# Patient Record
Sex: FEMALE | Race: WHITE | NOT HISPANIC OR LATINO | Employment: OTHER | URBAN - METROPOLITAN AREA
[De-identification: names, ages, dates, MRNs, and addresses within clinical notes are randomized per-mention and may not be internally consistent; named-entity substitution may affect disease eponyms.]

---

## 2017-01-12 ENCOUNTER — APPOINTMENT (INPATIENT)
Dept: CT IMAGING | Facility: HOSPITAL | Age: 72
DRG: 280 | End: 2017-01-12
Payer: COMMERCIAL

## 2017-01-12 ENCOUNTER — APPOINTMENT (INPATIENT)
Dept: NON INVASIVE DIAGNOSTICS | Facility: HOSPITAL | Age: 72
DRG: 280 | End: 2017-01-12
Payer: COMMERCIAL

## 2017-01-12 ENCOUNTER — APPOINTMENT (EMERGENCY)
Dept: CT IMAGING | Facility: HOSPITAL | Age: 72
DRG: 280 | End: 2017-01-12
Payer: COMMERCIAL

## 2017-01-12 ENCOUNTER — APPOINTMENT (INPATIENT)
Dept: NON INVASIVE DIAGNOSTICS | Facility: HOSPITAL | Age: 72
DRG: 280 | End: 2017-01-12
Attending: HOSPITALIST
Payer: COMMERCIAL

## 2017-01-12 ENCOUNTER — HOSPITAL ENCOUNTER (INPATIENT)
Facility: HOSPITAL | Age: 72
LOS: 3 days | DRG: 280 | End: 2017-01-15
Attending: EMERGENCY MEDICINE | Admitting: HOSPITALIST
Payer: COMMERCIAL

## 2017-01-12 ENCOUNTER — GENERIC CONVERSION - ENCOUNTER (OUTPATIENT)
Dept: OTHER | Facility: OTHER | Age: 72
End: 2017-01-12

## 2017-01-12 ENCOUNTER — APPOINTMENT (EMERGENCY)
Dept: RADIOLOGY | Facility: HOSPITAL | Age: 72
DRG: 280 | End: 2017-01-12
Payer: COMMERCIAL

## 2017-01-12 DIAGNOSIS — R77.8 ELEVATED TROPONIN: ICD-10-CM

## 2017-01-12 DIAGNOSIS — I21.4 NSTEMI (NON-ST ELEVATED MYOCARDIAL INFARCTION) (HCC): ICD-10-CM

## 2017-01-12 DIAGNOSIS — J18.9 COMMUNITY ACQUIRED PNEUMONIA: Primary | ICD-10-CM

## 2017-01-12 DIAGNOSIS — R07.9 CHEST PAIN: ICD-10-CM

## 2017-01-12 DIAGNOSIS — R09.02 HYPOXIA: ICD-10-CM

## 2017-01-12 PROBLEM — Z85.42 HISTORY OF ENDOMETRIAL CANCER: Status: ACTIVE | Noted: 2017-01-12

## 2017-01-12 PROBLEM — E11.9 TYPE 2 DIABETES MELLITUS WITHOUT COMPLICATION (HCC): Status: ACTIVE | Noted: 2017-01-12

## 2017-01-12 PROBLEM — M10.9 GOUT: Status: ACTIVE | Noted: 2017-01-12

## 2017-01-12 LAB
ALBUMIN SERPL BCP-MCNC: 3 G/DL (ref 3.5–5)
ALP SERPL-CCNC: 134 U/L (ref 46–116)
ALT SERPL W P-5'-P-CCNC: 41 U/L (ref 12–78)
ANION GAP BLD CALC-SCNC: 19 MMOL/L (ref 4–13)
APTT PPP: 26 SECONDS (ref 24–36)
APTT PPP: 33 SECONDS (ref 24–36)
AST SERPL W P-5'-P-CCNC: 26 U/L (ref 5–45)
ATRIAL RATE: 111 BPM
BASOPHILS # BLD AUTO: 0.05 THOUSANDS/ΜL (ref 0–0.1)
BASOPHILS NFR BLD AUTO: 1 % (ref 0–1)
BILIRUB DIRECT SERPL-MCNC: 0.11 MG/DL (ref 0–0.2)
BILIRUB SERPL-MCNC: 0.4 MG/DL (ref 0.2–1)
BUN BLD-MCNC: 17 MG/DL (ref 5–25)
CA-I BLD-SCNC: 1.15 MMOL/L (ref 1.12–1.32)
CHLORIDE BLD-SCNC: 100 MMOL/L (ref 100–108)
CLARITY, POC: CLEAR
COLOR, POC: YELLOW
CREAT BLD-MCNC: 0.5 MG/DL (ref 0.6–1.3)
EOSINOPHIL # BLD AUTO: 0.15 THOUSAND/ΜL (ref 0–0.61)
EOSINOPHIL NFR BLD AUTO: 2 % (ref 0–6)
ERYTHROCYTE [DISTWIDTH] IN BLOOD BY AUTOMATED COUNT: 14.4 % (ref 11.6–15.1)
ERYTHROCYTE [DISTWIDTH] IN BLOOD BY AUTOMATED COUNT: 14.5 % (ref 11.6–15.1)
EXT BILIRUBIN, UA: NORMAL
EXT BLOOD URINE: NORMAL
EXT GLUCOSE, UA: 500
EXT KETONES: NORMAL
EXT NITRITE, UA: NORMAL
EXT PH, UA: 6.5
EXT PROTEIN, UA: NORMAL
EXT SPECIFIC GRAVITY, UA: 1.01
EXT UROBILINOGEN: NORMAL
GFR SERPL CREATININE-BSD FRML MDRD: >60 ML/MIN/1.73SQ M
GLUCOSE SERPL-MCNC: 281 MG/DL (ref 65–140)
GLUCOSE SERPL-MCNC: 297 MG/DL (ref 65–140)
GLUCOSE SERPL-MCNC: 301 MG/DL (ref 65–140)
GLUCOSE SERPL-MCNC: 323 MG/DL (ref 65–140)
GLUCOSE SERPL-MCNC: 363 MG/DL (ref 65–140)
HCT VFR BLD AUTO: 36.8 % (ref 34.8–46.1)
HCT VFR BLD AUTO: 39.7 % (ref 34.8–46.1)
HCT VFR BLD CALC: 39 % (ref 34.8–46.1)
HGB BLD-MCNC: 12.1 G/DL (ref 11.5–15.4)
HGB BLD-MCNC: 13.2 G/DL (ref 11.5–15.4)
HGB BLDA-MCNC: 13.3 G/DL (ref 11.5–15.4)
INR PPP: 1 (ref 0.86–1.16)
L PNEUMO1 AG UR QL IA.RAPID: NEGATIVE
LYMPHOCYTES # BLD AUTO: 0.91 THOUSANDS/ΜL (ref 0.6–4.47)
LYMPHOCYTES NFR BLD AUTO: 12 % (ref 14–44)
MCH RBC QN AUTO: 30.3 PG (ref 26.8–34.3)
MCH RBC QN AUTO: 30.5 PG (ref 26.8–34.3)
MCHC RBC AUTO-ENTMCNC: 32.9 G/DL (ref 31.4–37.4)
MCHC RBC AUTO-ENTMCNC: 33.2 G/DL (ref 31.4–37.4)
MCV RBC AUTO: 91 FL (ref 82–98)
MCV RBC AUTO: 93 FL (ref 82–98)
MONOCYTES # BLD AUTO: 0.42 THOUSAND/ΜL (ref 0.17–1.22)
MONOCYTES NFR BLD AUTO: 5 % (ref 4–12)
NEUTROPHILS # BLD AUTO: 6.23 THOUSANDS/ΜL (ref 1.85–7.62)
NEUTS SEG NFR BLD AUTO: 80 % (ref 43–75)
NT-PROBNP SERPL-MCNC: 1465 PG/ML
P AXIS: 50 DEGREES
PCO2 BLD: 24 MMOL/L (ref 21–32)
PLATELET # BLD AUTO: 218 THOUSANDS/UL (ref 149–390)
PLATELET # BLD AUTO: 231 THOUSANDS/UL (ref 149–390)
PMV BLD AUTO: 10.3 FL (ref 8.9–12.7)
PMV BLD AUTO: 10.4 FL (ref 8.9–12.7)
POTASSIUM BLD-SCNC: 3.3 MMOL/L (ref 3.5–5.3)
PR INTERVAL: 136 MS
PROT SERPL-MCNC: 6.8 G/DL (ref 6.4–8.2)
PROTHROMBIN TIME: 13.1 SECONDS (ref 12–14.3)
QRS AXIS: 30 DEGREES
QRSD INTERVAL: 86 MS
QT INTERVAL: 360 MS
QTC INTERVAL: 489 MS
RBC # BLD AUTO: 3.97 MILLION/UL (ref 3.81–5.12)
RBC # BLD AUTO: 4.36 MILLION/UL (ref 3.81–5.12)
S PNEUM AG UR QL: NEGATIVE
SODIUM BLD-SCNC: 139 MMOL/L (ref 136–145)
SPECIMEN SOURCE: ABNORMAL
T WAVE AXIS: 61 DEGREES
TROPONIN I SERPL-MCNC: 0.09 NG/ML
TROPONIN I SERPL-MCNC: 0.1 NG/ML
TROPONIN I SERPL-MCNC: 0.1 NG/ML
VENTRICULAR RATE: 111 BPM
WBC # BLD AUTO: 6.61 THOUSAND/UL (ref 4.31–10.16)
WBC # BLD AUTO: 7.76 THOUSAND/UL (ref 4.31–10.16)
WBC # BLD EST: NORMAL 10*3/UL

## 2017-01-12 PROCEDURE — 87798 DETECT AGENT NOS DNA AMP: CPT | Performed by: HOSPITALIST

## 2017-01-12 PROCEDURE — 80047 BASIC METABLC PNL IONIZED CA: CPT

## 2017-01-12 PROCEDURE — 74177 CT ABD & PELVIS W/CONTRAST: CPT

## 2017-01-12 PROCEDURE — 84484 ASSAY OF TROPONIN QUANT: CPT | Performed by: EMERGENCY MEDICINE

## 2017-01-12 PROCEDURE — 85610 PROTHROMBIN TIME: CPT | Performed by: HOSPITALIST

## 2017-01-12 PROCEDURE — 87449 NOS EACH ORGANISM AG IA: CPT | Performed by: HOSPITALIST

## 2017-01-12 PROCEDURE — 81002 URINALYSIS NONAUTO W/O SCOPE: CPT | Performed by: EMERGENCY MEDICINE

## 2017-01-12 PROCEDURE — 36415 COLL VENOUS BLD VENIPUNCTURE: CPT | Performed by: EMERGENCY MEDICINE

## 2017-01-12 PROCEDURE — 87040 BLOOD CULTURE FOR BACTERIA: CPT | Performed by: EMERGENCY MEDICINE

## 2017-01-12 PROCEDURE — 85730 THROMBOPLASTIN TIME PARTIAL: CPT | Performed by: HOSPITALIST

## 2017-01-12 PROCEDURE — 84484 ASSAY OF TROPONIN QUANT: CPT | Performed by: HOSPITALIST

## 2017-01-12 PROCEDURE — 71275 CT ANGIOGRAPHY CHEST: CPT

## 2017-01-12 PROCEDURE — 99285 EMERGENCY DEPT VISIT HI MDM: CPT

## 2017-01-12 PROCEDURE — 85027 COMPLETE CBC AUTOMATED: CPT | Performed by: HOSPITALIST

## 2017-01-12 PROCEDURE — 83880 ASSAY OF NATRIURETIC PEPTIDE: CPT | Performed by: HOSPITALIST

## 2017-01-12 PROCEDURE — 82948 REAGENT STRIP/BLOOD GLUCOSE: CPT

## 2017-01-12 PROCEDURE — 93005 ELECTROCARDIOGRAM TRACING: CPT | Performed by: EMERGENCY MEDICINE

## 2017-01-12 PROCEDURE — 71020 HB CHEST X-RAY 2VW FRONTAL&LATL: CPT

## 2017-01-12 PROCEDURE — 96360 HYDRATION IV INFUSION INIT: CPT

## 2017-01-12 PROCEDURE — 85025 COMPLETE CBC W/AUTO DIFF WBC: CPT | Performed by: EMERGENCY MEDICINE

## 2017-01-12 PROCEDURE — 93005 ELECTROCARDIOGRAM TRACING: CPT

## 2017-01-12 PROCEDURE — 85014 HEMATOCRIT: CPT

## 2017-01-12 PROCEDURE — 80076 HEPATIC FUNCTION PANEL: CPT | Performed by: EMERGENCY MEDICINE

## 2017-01-12 PROCEDURE — C8929 TTE W OR WO FOL WCON,DOPPLER: HCPCS

## 2017-01-12 RX ORDER — LOSARTAN POTASSIUM 50 MG/1
100 TABLET ORAL DAILY
Status: DISCONTINUED | OUTPATIENT
Start: 2017-01-12 | End: 2017-01-15 | Stop reason: HOSPADM

## 2017-01-12 RX ORDER — ATORVASTATIN CALCIUM 40 MG/1
40 TABLET, FILM COATED ORAL EVERY EVENING
Status: DISCONTINUED | OUTPATIENT
Start: 2017-01-12 | End: 2017-01-15 | Stop reason: HOSPADM

## 2017-01-12 RX ORDER — LORAZEPAM 0.5 MG/1
0.5 TABLET ORAL
Status: DISCONTINUED | OUTPATIENT
Start: 2017-01-12 | End: 2017-01-15 | Stop reason: HOSPADM

## 2017-01-12 RX ORDER — POTASSIUM CHLORIDE 20 MEQ/1
40 TABLET, EXTENDED RELEASE ORAL ONCE
Status: COMPLETED | OUTPATIENT
Start: 2017-01-12 | End: 2017-01-12

## 2017-01-12 RX ORDER — ALLOPURINOL 300 MG/1
300 TABLET ORAL
Status: DISCONTINUED | OUTPATIENT
Start: 2017-01-12 | End: 2017-01-15 | Stop reason: HOSPADM

## 2017-01-12 RX ORDER — ASPIRIN 325 MG
325 TABLET, DELAYED RELEASE (ENTERIC COATED) ORAL DAILY
Status: DISCONTINUED | OUTPATIENT
Start: 2017-01-12 | End: 2017-01-15 | Stop reason: HOSPADM

## 2017-01-12 RX ORDER — LOSARTAN POTASSIUM 50 MG/1
50 TABLET ORAL DAILY
Status: DISCONTINUED | OUTPATIENT
Start: 2017-01-12 | End: 2017-01-12

## 2017-01-12 RX ORDER — FUROSEMIDE 10 MG/ML
40 INJECTION INTRAMUSCULAR; INTRAVENOUS DAILY
Status: DISCONTINUED | OUTPATIENT
Start: 2017-01-12 | End: 2017-01-13

## 2017-01-12 RX ORDER — CLOPIDOGREL BISULFATE 75 MG/1
75 TABLET ORAL DAILY
Status: DISCONTINUED | OUTPATIENT
Start: 2017-01-13 | End: 2017-01-15 | Stop reason: HOSPADM

## 2017-01-12 RX ORDER — CEFTRIAXONE SODIUM 1 G/50ML
1000 INJECTION, SOLUTION INTRAVENOUS ONCE
Status: COMPLETED | OUTPATIENT
Start: 2017-01-12 | End: 2017-01-12

## 2017-01-12 RX ORDER — ACETAMINOPHEN 325 MG/1
650 TABLET ORAL EVERY 4 HOURS PRN
Status: DISCONTINUED | OUTPATIENT
Start: 2017-01-12 | End: 2017-01-15 | Stop reason: HOSPADM

## 2017-01-12 RX ORDER — POTASSIUM CHLORIDE 20 MEQ/1
40 TABLET, EXTENDED RELEASE ORAL ONCE
Status: DISCONTINUED | OUTPATIENT
Start: 2017-01-12 | End: 2017-01-15 | Stop reason: HOSPADM

## 2017-01-12 RX ORDER — HEPARIN SODIUM 10000 [USP'U]/100ML
11.8 INJECTION, SOLUTION INTRAVENOUS
Status: DISCONTINUED | OUTPATIENT
Start: 2017-01-12 | End: 2017-01-15

## 2017-01-12 RX ORDER — DILTIAZEM HYDROCHLORIDE 60 MG/1
120 TABLET, FILM COATED ORAL DAILY
Status: DISCONTINUED | OUTPATIENT
Start: 2017-01-12 | End: 2017-01-15 | Stop reason: HOSPADM

## 2017-01-12 RX ORDER — NITROGLYCERIN 0.4 MG/1
0.4 TABLET SUBLINGUAL
Status: DISCONTINUED | OUTPATIENT
Start: 2017-01-12 | End: 2017-01-15 | Stop reason: HOSPADM

## 2017-01-12 RX ORDER — CLOPIDOGREL BISULFATE 75 MG/1
300 TABLET ORAL ONCE
Status: COMPLETED | OUTPATIENT
Start: 2017-01-12 | End: 2017-01-12

## 2017-01-12 RX ORDER — DULOXETIN HYDROCHLORIDE 30 MG/1
30 CAPSULE, DELAYED RELEASE ORAL DAILY
Status: DISCONTINUED | OUTPATIENT
Start: 2017-01-12 | End: 2017-01-15 | Stop reason: HOSPADM

## 2017-01-12 RX ADMIN — INSULIN LISPRO 2 UNITS: 100 INJECTION, SOLUTION INTRAVENOUS; SUBCUTANEOUS at 17:49

## 2017-01-12 RX ADMIN — SODIUM CHLORIDE 1000 ML: 0.9 INJECTION, SOLUTION INTRAVENOUS at 08:02

## 2017-01-12 RX ADMIN — INSULIN LISPRO 5 UNITS: 100 INJECTION, SOLUTION INTRAVENOUS; SUBCUTANEOUS at 17:49

## 2017-01-12 RX ADMIN — IOHEXOL 100 ML: 350 INJECTION, SOLUTION INTRAVENOUS at 09:29

## 2017-01-12 RX ADMIN — IODIXANOL 85 ML: 320 INJECTION, SOLUTION INTRAVASCULAR at 13:51

## 2017-01-12 RX ADMIN — INSULIN LISPRO 5 UNITS: 100 INJECTION, SOLUTION INTRAVENOUS; SUBCUTANEOUS at 14:08

## 2017-01-12 RX ADMIN — CLOPIDOGREL BISULFATE 300 MG: 75 TABLET ORAL at 14:00

## 2017-01-12 RX ADMIN — CEFTRIAXONE 1000 MG: 1 INJECTION, SOLUTION INTRAVENOUS at 10:12

## 2017-01-12 RX ADMIN — ATORVASTATIN CALCIUM 40 MG: 40 TABLET, FILM COATED ORAL at 17:48

## 2017-01-12 RX ADMIN — HEPARIN SODIUM AND DEXTROSE 11.8 UNITS/KG/HR: 10000; 5 INJECTION INTRAVENOUS at 14:05

## 2017-01-12 RX ADMIN — AZITHROMYCIN MONOHYDRATE 500 MG: 500 INJECTION, POWDER, LYOPHILIZED, FOR SOLUTION INTRAVENOUS at 10:42

## 2017-01-12 RX ADMIN — INSULIN DETEMIR 30 UNITS: 100 INJECTION, SOLUTION SUBCUTANEOUS at 22:09

## 2017-01-12 RX ADMIN — POTASSIUM CHLORIDE 40 MEQ: 1500 TABLET, EXTENDED RELEASE ORAL at 14:00

## 2017-01-12 RX ADMIN — FUROSEMIDE 40 MG: 10 INJECTION, SOLUTION INTRAMUSCULAR; INTRAVENOUS at 15:07

## 2017-01-12 RX ADMIN — PERFLUTREN 3 ML/MIN: 6.52 INJECTION, SUSPENSION INTRAVENOUS at 15:15

## 2017-01-12 RX ADMIN — ASPIRIN 325 MG: 325 TABLET, DELAYED RELEASE ORAL at 14:00

## 2017-01-13 ENCOUNTER — APPOINTMENT (INPATIENT)
Dept: RADIOLOGY | Facility: HOSPITAL | Age: 72
DRG: 280 | End: 2017-01-13
Payer: COMMERCIAL

## 2017-01-13 ENCOUNTER — APPOINTMENT (INPATIENT)
Dept: NON INVASIVE DIAGNOSTICS | Facility: HOSPITAL | Age: 72
DRG: 280 | End: 2017-01-13
Payer: COMMERCIAL

## 2017-01-13 LAB
ALBUMIN SERPL BCP-MCNC: 2.8 G/DL (ref 3.5–5)
ALP SERPL-CCNC: 122 U/L (ref 46–116)
ALT SERPL W P-5'-P-CCNC: 38 U/L (ref 12–78)
ANION GAP SERPL CALCULATED.3IONS-SCNC: 10 MMOL/L (ref 4–13)
APTT PPP: 54 SECONDS (ref 24–36)
APTT PPP: 68 SECONDS (ref 24–36)
APTT PPP: 71 SECONDS (ref 24–36)
AST SERPL W P-5'-P-CCNC: 25 U/L (ref 5–45)
ATRIAL RATE: 107 BPM
BILIRUB SERPL-MCNC: 0.3 MG/DL (ref 0.2–1)
BUN SERPL-MCNC: 15 MG/DL (ref 5–25)
CALCIUM SERPL-MCNC: 9.1 MG/DL (ref 8.3–10.1)
CHLORIDE SERPL-SCNC: 102 MMOL/L (ref 100–108)
CHOLEST SERPL-MCNC: 182 MG/DL (ref 50–200)
CO2 SERPL-SCNC: 26 MMOL/L (ref 21–32)
CREAT SERPL-MCNC: 0.77 MG/DL (ref 0.6–1.3)
ERYTHROCYTE [DISTWIDTH] IN BLOOD BY AUTOMATED COUNT: 14.5 % (ref 11.6–15.1)
FLUAV AG SPEC QL: NORMAL
FLUBV AG SPEC QL: NORMAL
GFR SERPL CREATININE-BSD FRML MDRD: >60 ML/MIN/1.73SQ M
GLUCOSE SERPL-MCNC: 230 MG/DL (ref 65–140)
GLUCOSE SERPL-MCNC: 274 MG/DL (ref 65–140)
GLUCOSE SERPL-MCNC: 307 MG/DL (ref 65–140)
GLUCOSE SERPL-MCNC: 326 MG/DL (ref 65–140)
GLUCOSE SERPL-MCNC: 341 MG/DL (ref 65–140)
GLUCOSE SERPL-MCNC: 350 MG/DL (ref 65–140)
HCT VFR BLD AUTO: 38.4 % (ref 34.8–46.1)
HDLC SERPL-MCNC: 35 MG/DL (ref 40–60)
HGB BLD-MCNC: 12.8 G/DL (ref 11.5–15.4)
LDLC SERPL CALC-MCNC: 90 MG/DL (ref 0–100)
MAGNESIUM SERPL-MCNC: 1.4 MG/DL (ref 1.6–2.6)
MCH RBC QN AUTO: 30.5 PG (ref 26.8–34.3)
MCHC RBC AUTO-ENTMCNC: 33.3 G/DL (ref 31.4–37.4)
MCV RBC AUTO: 91 FL (ref 82–98)
P AXIS: 42 DEGREES
PLATELET # BLD AUTO: 221 THOUSANDS/UL (ref 149–390)
PMV BLD AUTO: 9.9 FL (ref 8.9–12.7)
POTASSIUM SERPL-SCNC: 3.3 MMOL/L (ref 3.5–5.3)
PR INTERVAL: 136 MS
PROT SERPL-MCNC: 6.5 G/DL (ref 6.4–8.2)
QRS AXIS: 8 DEGREES
QRSD INTERVAL: 86 MS
QT INTERVAL: 370 MS
QTC INTERVAL: 493 MS
RBC # BLD AUTO: 4.2 MILLION/UL (ref 3.81–5.12)
RSV B RNA SPEC QL NAA+PROBE: NORMAL
SODIUM SERPL-SCNC: 138 MMOL/L (ref 136–145)
T WAVE AXIS: 66 DEGREES
TRIGL SERPL-MCNC: 283 MG/DL
VENTRICULAR RATE: 107 BPM
WBC # BLD AUTO: 6.46 THOUSAND/UL (ref 4.31–10.16)

## 2017-01-13 PROCEDURE — 85027 COMPLETE CBC AUTOMATED: CPT | Performed by: HOSPITALIST

## 2017-01-13 PROCEDURE — 80053 COMPREHEN METABOLIC PANEL: CPT | Performed by: HOSPITALIST

## 2017-01-13 PROCEDURE — 82948 REAGENT STRIP/BLOOD GLUCOSE: CPT

## 2017-01-13 PROCEDURE — 85730 THROMBOPLASTIN TIME PARTIAL: CPT | Performed by: HOSPITALIST

## 2017-01-13 PROCEDURE — 83735 ASSAY OF MAGNESIUM: CPT | Performed by: HOSPITALIST

## 2017-01-13 PROCEDURE — 80061 LIPID PANEL: CPT | Performed by: HOSPITALIST

## 2017-01-13 PROCEDURE — 93970 EXTREMITY STUDY: CPT

## 2017-01-13 PROCEDURE — 71010 HB CHEST X-RAY 1 VIEW FRONTAL (PORTABLE): CPT

## 2017-01-13 RX ORDER — MAGNESIUM SULFATE HEPTAHYDRATE 40 MG/ML
2 INJECTION, SOLUTION INTRAVENOUS ONCE
Status: COMPLETED | OUTPATIENT
Start: 2017-01-13 | End: 2017-01-13

## 2017-01-13 RX ORDER — FUROSEMIDE 10 MG/ML
20 INJECTION INTRAMUSCULAR; INTRAVENOUS ONCE
Status: COMPLETED | OUTPATIENT
Start: 2017-01-13 | End: 2017-01-13

## 2017-01-13 RX ORDER — FUROSEMIDE 10 MG/ML
40 INJECTION INTRAMUSCULAR; INTRAVENOUS DAILY
Status: DISCONTINUED | OUTPATIENT
Start: 2017-01-13 | End: 2017-01-14

## 2017-01-13 RX ORDER — ONDANSETRON 2 MG/ML
4 INJECTION INTRAMUSCULAR; INTRAVENOUS EVERY 4 HOURS PRN
Status: DISCONTINUED | OUTPATIENT
Start: 2017-01-13 | End: 2017-01-15 | Stop reason: HOSPADM

## 2017-01-13 RX ORDER — POTASSIUM CHLORIDE 20 MEQ/1
40 TABLET, EXTENDED RELEASE ORAL ONCE
Status: COMPLETED | OUTPATIENT
Start: 2017-01-13 | End: 2017-01-13

## 2017-01-13 RX ORDER — FUROSEMIDE 10 MG/ML
40 INJECTION INTRAMUSCULAR; INTRAVENOUS
Status: DISCONTINUED | OUTPATIENT
Start: 2017-01-13 | End: 2017-01-13

## 2017-01-13 RX ORDER — FAMOTIDINE 20 MG/1
20 TABLET, FILM COATED ORAL 2 TIMES DAILY
Status: DISCONTINUED | OUTPATIENT
Start: 2017-01-13 | End: 2017-01-15 | Stop reason: HOSPADM

## 2017-01-13 RX ADMIN — FAMOTIDINE 20 MG: 20 TABLET ORAL at 07:29

## 2017-01-13 RX ADMIN — CLOPIDOGREL 75 MG: 75 TABLET, FILM COATED ORAL at 08:49

## 2017-01-13 RX ADMIN — INSULIN LISPRO 5 UNITS: 100 INJECTION, SOLUTION INTRAVENOUS; SUBCUTANEOUS at 08:43

## 2017-01-13 RX ADMIN — AZITHROMYCIN MONOHYDRATE 500 MG: 500 INJECTION, POWDER, LYOPHILIZED, FOR SOLUTION INTRAVENOUS at 06:03

## 2017-01-13 RX ADMIN — INSULIN DETEMIR 30 UNITS: 100 INJECTION, SOLUTION SUBCUTANEOUS at 08:53

## 2017-01-13 RX ADMIN — FUROSEMIDE 40 MG: 10 INJECTION, SOLUTION INTRAMUSCULAR; INTRAVENOUS at 08:46

## 2017-01-13 RX ADMIN — ATORVASTATIN CALCIUM 40 MG: 40 TABLET, FILM COATED ORAL at 17:11

## 2017-01-13 RX ADMIN — INSULIN DETEMIR 45 UNITS: 100 INJECTION, SOLUTION SUBCUTANEOUS at 20:47

## 2017-01-13 RX ADMIN — POTASSIUM CHLORIDE 40 MEQ: 1500 TABLET, EXTENDED RELEASE ORAL at 16:18

## 2017-01-13 RX ADMIN — FAMOTIDINE 20 MG: 20 TABLET ORAL at 17:11

## 2017-01-13 RX ADMIN — LORAZEPAM 0.5 MG: 0.5 TABLET ORAL at 20:47

## 2017-01-13 RX ADMIN — MAGNESIUM SULFATE HEPTAHYDRATE 2 G: 40 INJECTION, SOLUTION INTRAVENOUS at 16:18

## 2017-01-13 RX ADMIN — DULOXETINE 30 MG: 30 CAPSULE, DELAYED RELEASE ORAL at 08:48

## 2017-01-13 RX ADMIN — DILTIAZEM HYDROCHLORIDE 120 MG: 60 TABLET, FILM COATED ORAL at 08:48

## 2017-01-13 RX ADMIN — CEFTRIAXONE SODIUM 1000 MG: 1 INJECTION, POWDER, FOR SOLUTION INTRAMUSCULAR; INTRAVENOUS at 06:01

## 2017-01-13 RX ADMIN — ASPIRIN 325 MG: 325 TABLET, DELAYED RELEASE ORAL at 08:49

## 2017-01-13 RX ADMIN — LORAZEPAM 0.5 MG: 0.5 TABLET ORAL at 06:21

## 2017-01-13 RX ADMIN — LOSARTAN POTASSIUM 100 MG: 50 TABLET, FILM COATED ORAL at 08:49

## 2017-01-13 RX ADMIN — FUROSEMIDE 20 MG: 10 INJECTION, SOLUTION INTRAMUSCULAR; INTRAVENOUS at 16:18

## 2017-01-13 RX ADMIN — INSULIN LISPRO 3 UNITS: 100 INJECTION, SOLUTION INTRAVENOUS; SUBCUTANEOUS at 08:44

## 2017-01-13 RX ADMIN — ALLOPURINOL 300 MG: 300 TABLET ORAL at 08:48

## 2017-01-13 RX ADMIN — INSULIN LISPRO 7 UNITS: 100 INJECTION, SOLUTION INTRAVENOUS; SUBCUTANEOUS at 15:40

## 2017-01-13 RX ADMIN — INSULIN LISPRO 3 UNITS: 100 INJECTION, SOLUTION INTRAVENOUS; SUBCUTANEOUS at 11:53

## 2017-01-13 RX ADMIN — HEPARIN SODIUM AND DEXTROSE 17.65 UNITS/KG/HR: 10000; 5 INJECTION INTRAVENOUS at 08:40

## 2017-01-14 ENCOUNTER — GENERIC CONVERSION - ENCOUNTER (OUTPATIENT)
Dept: OTHER | Facility: OTHER | Age: 72
End: 2017-01-14

## 2017-01-14 ENCOUNTER — APPOINTMENT (INPATIENT)
Dept: RADIOLOGY | Facility: HOSPITAL | Age: 72
DRG: 280 | End: 2017-01-14
Payer: COMMERCIAL

## 2017-01-14 LAB
ANION GAP SERPL CALCULATED.3IONS-SCNC: 11 MMOL/L (ref 4–13)
APTT PPP: 82 SECONDS (ref 24–36)
BUN SERPL-MCNC: 22 MG/DL (ref 5–25)
CALCIUM SERPL-MCNC: 9.1 MG/DL (ref 8.3–10.1)
CHLORIDE SERPL-SCNC: 101 MMOL/L (ref 100–108)
CO2 SERPL-SCNC: 25 MMOL/L (ref 21–32)
CREAT SERPL-MCNC: 0.68 MG/DL (ref 0.6–1.3)
ERYTHROCYTE [DISTWIDTH] IN BLOOD BY AUTOMATED COUNT: 14.7 % (ref 11.6–15.1)
GFR SERPL CREATININE-BSD FRML MDRD: >60 ML/MIN/1.73SQ M
GLUCOSE SERPL-MCNC: 193 MG/DL (ref 65–140)
GLUCOSE SERPL-MCNC: 254 MG/DL (ref 65–140)
GLUCOSE SERPL-MCNC: 258 MG/DL (ref 65–140)
GLUCOSE SERPL-MCNC: 291 MG/DL (ref 65–140)
GLUCOSE SERPL-MCNC: 367 MG/DL (ref 65–140)
GLUCOSE SERPL-MCNC: 370 MG/DL (ref 65–140)
HCT VFR BLD AUTO: 39 % (ref 34.8–46.1)
HGB BLD-MCNC: 12.9 G/DL (ref 11.5–15.4)
MCH RBC QN AUTO: 30.4 PG (ref 26.8–34.3)
MCHC RBC AUTO-ENTMCNC: 33.1 G/DL (ref 31.4–37.4)
MCV RBC AUTO: 92 FL (ref 82–98)
PLATELET # BLD AUTO: 227 THOUSANDS/UL (ref 149–390)
PMV BLD AUTO: 10.3 FL (ref 8.9–12.7)
POTASSIUM SERPL-SCNC: 3.6 MMOL/L (ref 3.5–5.3)
RBC # BLD AUTO: 4.25 MILLION/UL (ref 3.81–5.12)
SODIUM SERPL-SCNC: 137 MMOL/L (ref 136–145)
TROPONIN I SERPL-MCNC: 0.06 NG/ML
WBC # BLD AUTO: 6.98 THOUSAND/UL (ref 4.31–10.16)

## 2017-01-14 PROCEDURE — 85027 COMPLETE CBC AUTOMATED: CPT | Performed by: HOSPITALIST

## 2017-01-14 PROCEDURE — 84484 ASSAY OF TROPONIN QUANT: CPT | Performed by: HOSPITALIST

## 2017-01-14 PROCEDURE — 93005 ELECTROCARDIOGRAM TRACING: CPT | Performed by: HOSPITALIST

## 2017-01-14 PROCEDURE — 80048 BASIC METABOLIC PNL TOTAL CA: CPT | Performed by: HOSPITALIST

## 2017-01-14 PROCEDURE — 82948 REAGENT STRIP/BLOOD GLUCOSE: CPT

## 2017-01-14 PROCEDURE — 85730 THROMBOPLASTIN TIME PARTIAL: CPT | Performed by: HOSPITALIST

## 2017-01-14 PROCEDURE — 71010 HB CHEST X-RAY 1 VIEW FRONTAL (PORTABLE): CPT

## 2017-01-14 RX ORDER — POTASSIUM CHLORIDE 20 MEQ/1
20 TABLET, EXTENDED RELEASE ORAL DAILY
Status: DISCONTINUED | OUTPATIENT
Start: 2017-01-14 | End: 2017-01-15 | Stop reason: HOSPADM

## 2017-01-14 RX ORDER — POTASSIUM CHLORIDE 20 MEQ/1
40 TABLET, EXTENDED RELEASE ORAL ONCE
Status: COMPLETED | OUTPATIENT
Start: 2017-01-14 | End: 2017-01-14

## 2017-01-14 RX ORDER — FUROSEMIDE 10 MG/ML
40 INJECTION INTRAMUSCULAR; INTRAVENOUS
Status: DISCONTINUED | OUTPATIENT
Start: 2017-01-14 | End: 2017-01-15 | Stop reason: HOSPADM

## 2017-01-14 RX ORDER — POTASSIUM CHLORIDE 20 MEQ/1
20 TABLET, EXTENDED RELEASE ORAL DAILY
Status: DISCONTINUED | OUTPATIENT
Start: 2017-01-14 | End: 2017-01-14

## 2017-01-14 RX ORDER — POTASSIUM CHLORIDE 20 MEQ/1
40 TABLET, EXTENDED RELEASE ORAL DAILY
Status: DISCONTINUED | OUTPATIENT
Start: 2017-01-14 | End: 2017-01-14

## 2017-01-14 RX ORDER — POTASSIUM CHLORIDE 20 MEQ/1
20 TABLET, EXTENDED RELEASE ORAL DAILY
Status: DISCONTINUED | OUTPATIENT
Start: 2017-01-15 | End: 2017-01-14

## 2017-01-14 RX ADMIN — LOSARTAN POTASSIUM 100 MG: 50 TABLET, FILM COATED ORAL at 08:23

## 2017-01-14 RX ADMIN — HEPARIN SODIUM AND DEXTROSE 17.65 UNITS/KG/HR: 10000; 5 INJECTION INTRAVENOUS at 02:10

## 2017-01-14 RX ADMIN — NITROGLYCERIN 0.4 MG: 0.4 TABLET SUBLINGUAL at 14:06

## 2017-01-14 RX ADMIN — DULOXETINE 30 MG: 30 CAPSULE, DELAYED RELEASE ORAL at 08:23

## 2017-01-14 RX ADMIN — ALLOPURINOL 300 MG: 300 TABLET ORAL at 08:24

## 2017-01-14 RX ADMIN — LORAZEPAM 0.5 MG: 0.5 TABLET ORAL at 22:21

## 2017-01-14 RX ADMIN — INSULIN LISPRO 4 UNITS: 100 INJECTION, SOLUTION INTRAVENOUS; SUBCUTANEOUS at 16:42

## 2017-01-14 RX ADMIN — FUROSEMIDE 40 MG: 10 INJECTION, SOLUTION INTRAMUSCULAR; INTRAVENOUS at 15:57

## 2017-01-14 RX ADMIN — ATORVASTATIN CALCIUM 40 MG: 40 TABLET, FILM COATED ORAL at 17:13

## 2017-01-14 RX ADMIN — POTASSIUM CHLORIDE 40 MEQ: 1500 TABLET, EXTENDED RELEASE ORAL at 14:16

## 2017-01-14 RX ADMIN — ASPIRIN 325 MG: 325 TABLET, DELAYED RELEASE ORAL at 08:24

## 2017-01-14 RX ADMIN — INSULIN DETEMIR 45 UNITS: 100 INJECTION, SOLUTION SUBCUTANEOUS at 08:19

## 2017-01-14 RX ADMIN — POTASSIUM CHLORIDE 20 MEQ: 1500 TABLET, EXTENDED RELEASE ORAL at 15:57

## 2017-01-14 RX ADMIN — FAMOTIDINE 20 MG: 20 TABLET ORAL at 08:23

## 2017-01-14 RX ADMIN — INSULIN DETEMIR 45 UNITS: 100 INJECTION, SOLUTION SUBCUTANEOUS at 21:43

## 2017-01-14 RX ADMIN — INSULIN LISPRO 10 UNITS: 100 INJECTION, SOLUTION INTRAVENOUS; SUBCUTANEOUS at 14:05

## 2017-01-14 RX ADMIN — CLOPIDOGREL 75 MG: 75 TABLET, FILM COATED ORAL at 08:24

## 2017-01-14 RX ADMIN — FAMOTIDINE 20 MG: 20 TABLET ORAL at 17:13

## 2017-01-14 RX ADMIN — DILTIAZEM HYDROCHLORIDE 120 MG: 60 TABLET, FILM COATED ORAL at 08:23

## 2017-01-14 RX ADMIN — HEPARIN SODIUM AND DEXTROSE 17.9 UNITS/KG/HR: 10000; 5 INJECTION INTRAVENOUS at 20:24

## 2017-01-14 RX ADMIN — FUROSEMIDE 40 MG: 10 INJECTION, SOLUTION INTRAMUSCULAR; INTRAVENOUS at 08:21

## 2017-01-15 ENCOUNTER — HOSPITAL ENCOUNTER (INPATIENT)
Facility: HOSPITAL | Age: 72
LOS: 6 days | Discharge: NURSING, REHAB OR LONG-TERM CARE | DRG: 246 | End: 2017-01-21
Attending: HOSPITALIST | Admitting: HOSPITALIST
Payer: COMMERCIAL

## 2017-01-15 VITALS
DIASTOLIC BLOOD PRESSURE: 81 MMHG | TEMPERATURE: 98.6 F | HEIGHT: 62 IN | BODY MASS INDEX: 32.86 KG/M2 | WEIGHT: 178.57 LBS | RESPIRATION RATE: 18 BRPM | SYSTOLIC BLOOD PRESSURE: 143 MMHG | OXYGEN SATURATION: 97 % | HEART RATE: 101 BPM

## 2017-01-15 DIAGNOSIS — I21.4 NSTEMI (NON-ST ELEVATED MYOCARDIAL INFARCTION) (HCC): Primary | ICD-10-CM

## 2017-01-15 DIAGNOSIS — R41.89 ACUTE COGNITIVE DECLINE: ICD-10-CM

## 2017-01-15 DIAGNOSIS — R41.0 DELIRIUM: ICD-10-CM

## 2017-01-15 PROBLEM — I50.9 ACUTE CONGESTIVE HEART FAILURE (HCC): Status: ACTIVE | Noted: 2017-01-15

## 2017-01-15 PROBLEM — I10 HTN (HYPERTENSION): Status: ACTIVE | Noted: 2017-01-15

## 2017-01-15 PROBLEM — Z86.73 H/O TIA (TRANSIENT ISCHEMIC ATTACK) AND STROKE: Status: ACTIVE | Noted: 2017-01-15

## 2017-01-15 PROBLEM — R07.9 CHEST PAIN: Status: RESOLVED | Noted: 2017-01-12 | Resolved: 2017-01-15

## 2017-01-15 LAB
ANION GAP SERPL CALCULATED.3IONS-SCNC: 13 MMOL/L (ref 4–13)
APTT PPP: 86 SECONDS (ref 24–36)
BUN SERPL-MCNC: 21 MG/DL (ref 5–25)
CALCIUM SERPL-MCNC: 9.2 MG/DL (ref 8.3–10.1)
CHLORIDE SERPL-SCNC: 100 MMOL/L (ref 100–108)
CO2 SERPL-SCNC: 25 MMOL/L (ref 21–32)
CREAT SERPL-MCNC: 0.71 MG/DL (ref 0.6–1.3)
ERYTHROCYTE [DISTWIDTH] IN BLOOD BY AUTOMATED COUNT: 14.6 % (ref 11.6–15.1)
GFR SERPL CREATININE-BSD FRML MDRD: >60 ML/MIN/1.73SQ M
GLUCOSE SERPL-MCNC: 169 MG/DL (ref 65–140)
GLUCOSE SERPL-MCNC: 214 MG/DL (ref 65–140)
GLUCOSE SERPL-MCNC: 304 MG/DL (ref 65–140)
GLUCOSE SERPL-MCNC: 317 MG/DL (ref 65–140)
HCT VFR BLD AUTO: 38.8 % (ref 34.8–46.1)
HGB BLD-MCNC: 12.5 G/DL (ref 11.5–15.4)
MAGNESIUM SERPL-MCNC: 1.4 MG/DL (ref 1.6–2.6)
MCH RBC QN AUTO: 29.6 PG (ref 26.8–34.3)
MCHC RBC AUTO-ENTMCNC: 32.2 G/DL (ref 31.4–37.4)
MCV RBC AUTO: 92 FL (ref 82–98)
PLATELET # BLD AUTO: 255 THOUSANDS/UL (ref 149–390)
PMV BLD AUTO: 10.2 FL (ref 8.9–12.7)
POTASSIUM SERPL-SCNC: 3.6 MMOL/L (ref 3.5–5.3)
RBC # BLD AUTO: 4.22 MILLION/UL (ref 3.81–5.12)
SODIUM SERPL-SCNC: 138 MMOL/L (ref 136–145)
WBC # BLD AUTO: 6.71 THOUSAND/UL (ref 4.31–10.16)

## 2017-01-15 PROCEDURE — 82948 REAGENT STRIP/BLOOD GLUCOSE: CPT

## 2017-01-15 PROCEDURE — 83735 ASSAY OF MAGNESIUM: CPT | Performed by: HOSPITALIST

## 2017-01-15 PROCEDURE — 80048 BASIC METABOLIC PNL TOTAL CA: CPT | Performed by: HOSPITALIST

## 2017-01-15 PROCEDURE — 85027 COMPLETE CBC AUTOMATED: CPT | Performed by: HOSPITALIST

## 2017-01-15 PROCEDURE — 85730 THROMBOPLASTIN TIME PARTIAL: CPT | Performed by: HOSPITALIST

## 2017-01-15 RX ORDER — FUROSEMIDE 40 MG/1
40 TABLET ORAL DAILY
Status: CANCELLED | OUTPATIENT
Start: 2017-01-15

## 2017-01-15 RX ORDER — ASPIRIN 325 MG
325 TABLET, DELAYED RELEASE (ENTERIC COATED) ORAL DAILY
Status: CANCELLED | OUTPATIENT
Start: 2017-01-16

## 2017-01-15 RX ORDER — FAMOTIDINE 20 MG/1
20 TABLET, FILM COATED ORAL 2 TIMES DAILY
Status: CANCELLED | OUTPATIENT
Start: 2017-01-15

## 2017-01-15 RX ORDER — CLOPIDOGREL BISULFATE 75 MG/1
75 TABLET ORAL DAILY
Status: CANCELLED | OUTPATIENT
Start: 2017-01-16

## 2017-01-15 RX ORDER — DULOXETIN HYDROCHLORIDE 30 MG/1
30 CAPSULE, DELAYED RELEASE ORAL DAILY
Status: CANCELLED | OUTPATIENT
Start: 2017-01-16

## 2017-01-15 RX ORDER — FAMOTIDINE 20 MG/1
20 TABLET, FILM COATED ORAL 2 TIMES DAILY
Status: DISCONTINUED | OUTPATIENT
Start: 2017-01-15 | End: 2017-01-21 | Stop reason: HOSPADM

## 2017-01-15 RX ORDER — ALLOPURINOL 300 MG/1
300 TABLET ORAL
Status: CANCELLED | OUTPATIENT
Start: 2017-01-16

## 2017-01-15 RX ORDER — NITROGLYCERIN 0.4 MG/1
0.4 TABLET SUBLINGUAL
Status: CANCELLED | OUTPATIENT
Start: 2017-01-15

## 2017-01-15 RX ORDER — MAGNESIUM SULFATE HEPTAHYDRATE 40 MG/ML
4 INJECTION, SOLUTION INTRAVENOUS ONCE
Status: DISCONTINUED | OUTPATIENT
Start: 2017-01-15 | End: 2017-01-15 | Stop reason: HOSPADM

## 2017-01-15 RX ORDER — DILTIAZEM HYDROCHLORIDE 60 MG/1
120 TABLET, FILM COATED ORAL DAILY
Status: DISCONTINUED | OUTPATIENT
Start: 2017-01-16 | End: 2017-01-17

## 2017-01-15 RX ORDER — DULOXETIN HYDROCHLORIDE 30 MG/1
30 CAPSULE, DELAYED RELEASE ORAL DAILY
Status: DISCONTINUED | OUTPATIENT
Start: 2017-01-16 | End: 2017-01-21 | Stop reason: HOSPADM

## 2017-01-15 RX ORDER — ONDANSETRON 2 MG/ML
4 INJECTION INTRAMUSCULAR; INTRAVENOUS EVERY 4 HOURS PRN
Status: DISCONTINUED | OUTPATIENT
Start: 2017-01-15 | End: 2017-01-21 | Stop reason: HOSPADM

## 2017-01-15 RX ORDER — POTASSIUM CHLORIDE 20 MEQ/1
40 TABLET, EXTENDED RELEASE ORAL ONCE
Status: COMPLETED | OUTPATIENT
Start: 2017-01-15 | End: 2017-01-15

## 2017-01-15 RX ORDER — POTASSIUM CHLORIDE 20 MEQ/1
20 TABLET, EXTENDED RELEASE ORAL DAILY
Status: DISCONTINUED | OUTPATIENT
Start: 2017-01-16 | End: 2017-01-21 | Stop reason: HOSPADM

## 2017-01-15 RX ORDER — LOSARTAN POTASSIUM 50 MG/1
100 TABLET ORAL DAILY
Status: DISCONTINUED | OUTPATIENT
Start: 2017-01-16 | End: 2017-01-21 | Stop reason: HOSPADM

## 2017-01-15 RX ORDER — DILTIAZEM HYDROCHLORIDE 60 MG/1
120 TABLET, FILM COATED ORAL DAILY
Status: CANCELLED | OUTPATIENT
Start: 2017-01-16

## 2017-01-15 RX ORDER — CLOPIDOGREL BISULFATE 75 MG/1
75 TABLET ORAL DAILY
Status: DISCONTINUED | OUTPATIENT
Start: 2017-01-16 | End: 2017-01-21 | Stop reason: HOSPADM

## 2017-01-15 RX ORDER — NITROGLYCERIN 0.4 MG/1
0.4 TABLET SUBLINGUAL
Status: DISCONTINUED | OUTPATIENT
Start: 2017-01-15 | End: 2017-01-21 | Stop reason: HOSPADM

## 2017-01-15 RX ORDER — LORAZEPAM 0.5 MG/1
0.5 TABLET ORAL
Status: CANCELLED | OUTPATIENT
Start: 2017-01-15

## 2017-01-15 RX ORDER — ATORVASTATIN CALCIUM 40 MG/1
40 TABLET, FILM COATED ORAL EVERY EVENING
Status: DISCONTINUED | OUTPATIENT
Start: 2017-01-15 | End: 2017-01-16

## 2017-01-15 RX ORDER — ACETAMINOPHEN 325 MG/1
650 TABLET ORAL EVERY 4 HOURS PRN
Status: DISCONTINUED | OUTPATIENT
Start: 2017-01-15 | End: 2017-01-21 | Stop reason: HOSPADM

## 2017-01-15 RX ORDER — ATORVASTATIN CALCIUM 40 MG/1
40 TABLET, FILM COATED ORAL EVERY EVENING
Status: CANCELLED | OUTPATIENT
Start: 2017-01-15

## 2017-01-15 RX ORDER — ACETAMINOPHEN 325 MG/1
650 TABLET ORAL EVERY 4 HOURS PRN
Status: CANCELLED | OUTPATIENT
Start: 2017-01-15

## 2017-01-15 RX ORDER — ONDANSETRON 2 MG/ML
4 INJECTION INTRAMUSCULAR; INTRAVENOUS EVERY 4 HOURS PRN
Status: CANCELLED | OUTPATIENT
Start: 2017-01-15

## 2017-01-15 RX ORDER — LOSARTAN POTASSIUM 50 MG/1
100 TABLET ORAL DAILY
Status: CANCELLED | OUTPATIENT
Start: 2017-01-16

## 2017-01-15 RX ORDER — FUROSEMIDE 40 MG/1
40 TABLET ORAL DAILY
Status: DISCONTINUED | OUTPATIENT
Start: 2017-01-16 | End: 2017-01-21 | Stop reason: HOSPADM

## 2017-01-15 RX ORDER — POTASSIUM CHLORIDE 20 MEQ/1
20 TABLET, EXTENDED RELEASE ORAL DAILY
Status: CANCELLED | OUTPATIENT
Start: 2017-01-16

## 2017-01-15 RX ORDER — ASPIRIN 325 MG
325 TABLET, DELAYED RELEASE (ENTERIC COATED) ORAL DAILY
Status: DISCONTINUED | OUTPATIENT
Start: 2017-01-16 | End: 2017-01-16

## 2017-01-15 RX ORDER — ALLOPURINOL 300 MG/1
300 TABLET ORAL
Status: DISCONTINUED | OUTPATIENT
Start: 2017-01-16 | End: 2017-01-21 | Stop reason: HOSPADM

## 2017-01-15 RX ORDER — LORAZEPAM 0.5 MG/1
0.5 TABLET ORAL
Status: DISCONTINUED | OUTPATIENT
Start: 2017-01-15 | End: 2017-01-21 | Stop reason: HOSPADM

## 2017-01-15 RX ADMIN — FAMOTIDINE 20 MG: 20 TABLET ORAL at 17:37

## 2017-01-15 RX ADMIN — LORAZEPAM 0.5 MG: 0.5 TABLET ORAL at 21:45

## 2017-01-15 RX ADMIN — POTASSIUM CHLORIDE 20 MEQ: 1500 TABLET, EXTENDED RELEASE ORAL at 09:10

## 2017-01-15 RX ADMIN — ATORVASTATIN CALCIUM 40 MG: 40 TABLET, FILM COATED ORAL at 17:37

## 2017-01-15 RX ADMIN — FAMOTIDINE 20 MG: 20 TABLET ORAL at 09:11

## 2017-01-15 RX ADMIN — POTASSIUM CHLORIDE 40 MEQ: 1500 TABLET, EXTENDED RELEASE ORAL at 12:39

## 2017-01-15 RX ADMIN — DILTIAZEM HYDROCHLORIDE 120 MG: 60 TABLET, FILM COATED ORAL at 09:10

## 2017-01-15 RX ADMIN — FUROSEMIDE 40 MG: 10 INJECTION, SOLUTION INTRAMUSCULAR; INTRAVENOUS at 09:06

## 2017-01-15 RX ADMIN — ALLOPURINOL 300 MG: 300 TABLET ORAL at 09:10

## 2017-01-15 RX ADMIN — ASPIRIN 325 MG: 325 TABLET, DELAYED RELEASE ORAL at 09:10

## 2017-01-15 RX ADMIN — INSULIN LISPRO 1 UNITS: 100 INJECTION, SOLUTION INTRAVENOUS; SUBCUTANEOUS at 09:06

## 2017-01-15 RX ADMIN — INSULIN DETEMIR 45 UNITS: 100 INJECTION, SOLUTION SUBCUTANEOUS at 09:06

## 2017-01-15 RX ADMIN — CLOPIDOGREL 75 MG: 75 TABLET, FILM COATED ORAL at 09:11

## 2017-01-15 RX ADMIN — DULOXETINE 30 MG: 30 CAPSULE, DELAYED RELEASE ORAL at 09:11

## 2017-01-15 RX ADMIN — INSULIN DETEMIR 45 UNITS: 100 INJECTION, SOLUTION SUBCUTANEOUS at 21:45

## 2017-01-15 RX ADMIN — INSULIN LISPRO 3 UNITS: 100 INJECTION, SOLUTION INTRAVENOUS; SUBCUTANEOUS at 11:56

## 2017-01-15 RX ADMIN — LOSARTAN POTASSIUM 100 MG: 50 TABLET, FILM COATED ORAL at 09:11

## 2017-01-15 RX ADMIN — MAGNESIUM SULFATE IN WATER 4 G: 40 INJECTION, SOLUTION INTRAVENOUS at 12:40

## 2017-01-16 ENCOUNTER — APPOINTMENT (INPATIENT)
Dept: NON INVASIVE DIAGNOSTICS | Facility: HOSPITAL | Age: 72
DRG: 246 | End: 2017-01-16
Payer: COMMERCIAL

## 2017-01-16 ENCOUNTER — GENERIC CONVERSION - ENCOUNTER (OUTPATIENT)
Dept: OTHER | Facility: OTHER | Age: 72
End: 2017-01-16

## 2017-01-16 LAB
ATRIAL RATE: 93 BPM
GLUCOSE SERPL-MCNC: 160 MG/DL (ref 65–140)
GLUCOSE SERPL-MCNC: 182 MG/DL (ref 65–140)
GLUCOSE SERPL-MCNC: 188 MG/DL (ref 65–140)
GLUCOSE SERPL-MCNC: 240 MG/DL (ref 65–140)
P AXIS: 49 DEGREES
PR INTERVAL: 146 MS
QRS AXIS: 2 DEGREES
QRSD INTERVAL: 90 MS
QT INTERVAL: 378 MS
QTC INTERVAL: 469 MS
T WAVE AXIS: 61 DEGREES
VENTRICULAR RATE: 93 BPM

## 2017-01-16 PROCEDURE — 02703ZZ DILATION OF CORONARY ARTERY, ONE ARTERY, PERCUTANEOUS APPROACH: ICD-10-PCS | Performed by: INTERNAL MEDICINE

## 2017-01-16 PROCEDURE — C1769 GUIDE WIRE: HCPCS | Performed by: INTERNAL MEDICINE

## 2017-01-16 PROCEDURE — G8978 MOBILITY CURRENT STATUS: HCPCS

## 2017-01-16 PROCEDURE — 99152 MOD SED SAME PHYS/QHP 5/>YRS: CPT | Performed by: INTERNAL MEDICINE

## 2017-01-16 PROCEDURE — 82948 REAGENT STRIP/BLOOD GLUCOSE: CPT

## 2017-01-16 PROCEDURE — B2111ZZ FLUOROSCOPY OF MULTIPLE CORONARY ARTERIES USING LOW OSMOLAR CONTRAST: ICD-10-PCS | Performed by: INTERNAL MEDICINE

## 2017-01-16 PROCEDURE — 92921 HB PRQ CARDIAC ANGIO ADDL ART: CPT | Performed by: INTERNAL MEDICINE

## 2017-01-16 PROCEDURE — 97162 PT EVAL MOD COMPLEX 30 MIN: CPT

## 2017-01-16 PROCEDURE — C9600 PERC DRUG-EL COR STENT SING: HCPCS | Performed by: INTERNAL MEDICINE

## 2017-01-16 PROCEDURE — 027034Z DILATION OF CORONARY ARTERY, ONE ARTERY WITH DRUG-ELUTING INTRALUMINAL DEVICE, PERCUTANEOUS APPROACH: ICD-10-PCS | Performed by: INTERNAL MEDICINE

## 2017-01-16 PROCEDURE — 99153 MOD SED SAME PHYS/QHP EA: CPT | Performed by: INTERNAL MEDICINE

## 2017-01-16 PROCEDURE — G8979 MOBILITY GOAL STATUS: HCPCS

## 2017-01-16 PROCEDURE — C1725 CATH, TRANSLUMIN NON-LASER: HCPCS | Performed by: INTERNAL MEDICINE

## 2017-01-16 PROCEDURE — C1894 INTRO/SHEATH, NON-LASER: HCPCS | Performed by: INTERNAL MEDICINE

## 2017-01-16 PROCEDURE — 93454 CORONARY ARTERY ANGIO S&I: CPT | Performed by: INTERNAL MEDICINE

## 2017-01-16 PROCEDURE — C1874 STENT, COATED/COV W/DEL SYS: HCPCS

## 2017-01-16 PROCEDURE — 85347 COAGULATION TIME ACTIVATED: CPT

## 2017-01-16 PROCEDURE — C1887 CATHETER, GUIDING: HCPCS | Performed by: INTERNAL MEDICINE

## 2017-01-16 RX ORDER — NITROGLYCERIN 20 MG/100ML
INJECTION INTRAVENOUS CODE/TRAUMA/SEDATION MEDICATION
Status: COMPLETED | OUTPATIENT
Start: 2017-01-16 | End: 2017-01-16

## 2017-01-16 RX ORDER — MIDAZOLAM HYDROCHLORIDE 1 MG/ML
INJECTION INTRAMUSCULAR; INTRAVENOUS CODE/TRAUMA/SEDATION MEDICATION
Status: COMPLETED | OUTPATIENT
Start: 2017-01-16 | End: 2017-01-16

## 2017-01-16 RX ORDER — FENTANYL CITRATE 50 UG/ML
INJECTION, SOLUTION INTRAMUSCULAR; INTRAVENOUS CODE/TRAUMA/SEDATION MEDICATION
Status: COMPLETED | OUTPATIENT
Start: 2017-01-16 | End: 2017-01-16

## 2017-01-16 RX ORDER — VERAPAMIL HCL 2.5 MG/ML
AMPUL (ML) INTRAVENOUS CODE/TRAUMA/SEDATION MEDICATION
Status: COMPLETED | OUTPATIENT
Start: 2017-01-16 | End: 2017-01-16

## 2017-01-16 RX ORDER — SODIUM CHLORIDE 9 MG/ML
75 INJECTION, SOLUTION INTRAVENOUS CONTINUOUS
Status: DISCONTINUED | OUTPATIENT
Start: 2017-01-16 | End: 2017-01-17

## 2017-01-16 RX ORDER — ASPIRIN 81 MG/1
81 TABLET, CHEWABLE ORAL DAILY
Status: DISCONTINUED | OUTPATIENT
Start: 2017-01-16 | End: 2017-01-21 | Stop reason: HOSPADM

## 2017-01-16 RX ORDER — HEPARIN SODIUM 1000 [USP'U]/ML
INJECTION, SOLUTION INTRAVENOUS; SUBCUTANEOUS CODE/TRAUMA/SEDATION MEDICATION
Status: COMPLETED | OUTPATIENT
Start: 2017-01-16 | End: 2017-01-16

## 2017-01-16 RX ORDER — SODIUM CHLORIDE 9 MG/ML
75 INJECTION, SOLUTION INTRAVENOUS ONCE
Status: COMPLETED | OUTPATIENT
Start: 2017-01-16 | End: 2017-01-17

## 2017-01-16 RX ORDER — METOPROLOL SUCCINATE 25 MG/1
25 TABLET, EXTENDED RELEASE ORAL DAILY
Status: DISCONTINUED | OUTPATIENT
Start: 2017-01-16 | End: 2017-01-18

## 2017-01-16 RX ORDER — ATORVASTATIN CALCIUM 80 MG/1
80 TABLET, FILM COATED ORAL EVERY EVENING
Status: DISCONTINUED | OUTPATIENT
Start: 2017-01-16 | End: 2017-01-21 | Stop reason: HOSPADM

## 2017-01-16 RX ADMIN — FENTANYL CITRATE 50 MCG: 50 INJECTION, SOLUTION INTRAMUSCULAR; INTRAVENOUS at 14:05

## 2017-01-16 RX ADMIN — ALLOPURINOL 300 MG: 300 TABLET ORAL at 09:57

## 2017-01-16 RX ADMIN — FUROSEMIDE 40 MG: 40 TABLET ORAL at 09:57

## 2017-01-16 RX ADMIN — NITROGLYCERIN 400 MCG: 20 INJECTION INTRAVENOUS at 14:39

## 2017-01-16 RX ADMIN — INSULIN LISPRO 8 UNITS: 100 INJECTION, SOLUTION INTRAVENOUS; SUBCUTANEOUS at 17:07

## 2017-01-16 RX ADMIN — VERAPAMIL HYDROCHLORIDE 1.25 MG: 2.5 INJECTION, SOLUTION INTRAVENOUS at 14:11

## 2017-01-16 RX ADMIN — ASPIRIN 325 MG: 325 TABLET, DELAYED RELEASE ORAL at 09:57

## 2017-01-16 RX ADMIN — ASPIRIN 81 MG 81 MG: 81 TABLET ORAL at 17:06

## 2017-01-16 RX ADMIN — MIDAZOLAM HYDROCHLORIDE 1 MG: 1 INJECTION, SOLUTION INTRAMUSCULAR; INTRAVENOUS at 14:05

## 2017-01-16 RX ADMIN — IOHEXOL 110 ML: 350 INJECTION, SOLUTION INTRAVENOUS at 15:10

## 2017-01-16 RX ADMIN — DILTIAZEM HYDROCHLORIDE 120 MG: 60 TABLET, FILM COATED ORAL at 09:57

## 2017-01-16 RX ADMIN — POTASSIUM CHLORIDE 20 MEQ: 1500 TABLET, EXTENDED RELEASE ORAL at 09:57

## 2017-01-16 RX ADMIN — FAMOTIDINE 20 MG: 20 TABLET ORAL at 17:07

## 2017-01-16 RX ADMIN — DULOXETINE HYDROCHLORIDE 30 MG: 30 CAPSULE, DELAYED RELEASE ORAL at 09:57

## 2017-01-16 RX ADMIN — ATORVASTATIN CALCIUM 80 MG: 80 TABLET, FILM COATED ORAL at 17:06

## 2017-01-16 RX ADMIN — MIDAZOLAM HYDROCHLORIDE 1 MG: 1 INJECTION, SOLUTION INTRAMUSCULAR; INTRAVENOUS at 14:43

## 2017-01-16 RX ADMIN — FENTANYL CITRATE 25 MCG: 50 INJECTION, SOLUTION INTRAMUSCULAR; INTRAVENOUS at 14:43

## 2017-01-16 RX ADMIN — METOPROLOL SUCCINATE 25 MG: 25 TABLET, EXTENDED RELEASE ORAL at 09:57

## 2017-01-16 RX ADMIN — HEPARIN SODIUM 6000 UNITS: 1000 INJECTION INTRAVENOUS; SUBCUTANEOUS at 14:17

## 2017-01-16 RX ADMIN — LOSARTAN POTASSIUM 100 MG: 50 TABLET, FILM COATED ORAL at 09:57

## 2017-01-16 RX ADMIN — NITROGLYCERIN 400 MCG: 20 INJECTION INTRAVENOUS at 14:38

## 2017-01-16 RX ADMIN — INSULIN DETEMIR 45 UNITS: 100 INJECTION, SOLUTION SUBCUTANEOUS at 21:17

## 2017-01-16 RX ADMIN — NITROGLYCERIN 200 MCG: 20 INJECTION INTRAVENOUS at 14:10

## 2017-01-16 RX ADMIN — FAMOTIDINE 20 MG: 20 TABLET ORAL at 09:58

## 2017-01-16 RX ADMIN — SODIUM CHLORIDE 75 ML/HR: 0.9 INJECTION, SOLUTION INTRAVENOUS at 09:48

## 2017-01-16 RX ADMIN — CLOPIDOGREL BISULFATE 75 MG: 75 TABLET ORAL at 10:18

## 2017-01-16 RX ADMIN — HEPARIN SODIUM 4000 UNITS: 1000 INJECTION INTRAVENOUS; SUBCUTANEOUS at 14:11

## 2017-01-17 ENCOUNTER — APPOINTMENT (INPATIENT)
Dept: NON INVASIVE DIAGNOSTICS | Facility: HOSPITAL | Age: 72
DRG: 246 | End: 2017-01-17
Attending: INTERNAL MEDICINE
Payer: COMMERCIAL

## 2017-01-17 ENCOUNTER — GENERIC CONVERSION - ENCOUNTER (OUTPATIENT)
Dept: OTHER | Facility: OTHER | Age: 72
End: 2017-01-17

## 2017-01-17 PROBLEM — R07.9 CHEST PAIN: Status: RESOLVED | Noted: 2017-01-12 | Resolved: 2017-01-17

## 2017-01-17 LAB
ANION GAP SERPL CALCULATED.3IONS-SCNC: 8 MMOL/L (ref 4–13)
BACTERIA BLD CULT: NORMAL
BACTERIA BLD CULT: NORMAL
BUN SERPL-MCNC: 19 MG/DL (ref 5–25)
CALCIUM SERPL-MCNC: 9.3 MG/DL (ref 8.3–10.1)
CHLORIDE SERPL-SCNC: 103 MMOL/L (ref 100–108)
CO2 SERPL-SCNC: 26 MMOL/L (ref 21–32)
CREAT SERPL-MCNC: 0.71 MG/DL (ref 0.6–1.3)
ERYTHROCYTE [DISTWIDTH] IN BLOOD BY AUTOMATED COUNT: 14.6 % (ref 11.6–15.1)
EST. AVERAGE GLUCOSE BLD GHB EST-MCNC: 232 MG/DL
GFR SERPL CREATININE-BSD FRML MDRD: >60 ML/MIN/1.73SQ M
GLUCOSE SERPL-MCNC: 105 MG/DL (ref 65–140)
GLUCOSE SERPL-MCNC: 162 MG/DL (ref 65–140)
GLUCOSE SERPL-MCNC: 187 MG/DL (ref 65–140)
GLUCOSE SERPL-MCNC: 187 MG/DL (ref 65–140)
GLUCOSE SERPL-MCNC: 218 MG/DL (ref 65–140)
HBA1C MFR BLD: 9.7 % (ref 4.2–6.3)
HCT VFR BLD AUTO: 39.8 % (ref 34.8–46.1)
HGB BLD-MCNC: 12.9 G/DL (ref 11.5–15.4)
KCT BLD-ACNC: 275 SEC (ref 89–137)
MAGNESIUM SERPL-MCNC: 1.9 MG/DL (ref 1.6–2.6)
MCH RBC QN AUTO: 30.1 PG (ref 26.8–34.3)
MCHC RBC AUTO-ENTMCNC: 32.4 G/DL (ref 31.4–37.4)
MCV RBC AUTO: 93 FL (ref 82–98)
PLATELET # BLD AUTO: 272 THOUSANDS/UL (ref 149–390)
PMV BLD AUTO: 10.5 FL (ref 8.9–12.7)
POTASSIUM SERPL-SCNC: 3.8 MMOL/L (ref 3.5–5.3)
RBC # BLD AUTO: 4.29 MILLION/UL (ref 3.81–5.12)
SODIUM SERPL-SCNC: 137 MMOL/L (ref 136–145)
SPECIMEN SOURCE: ABNORMAL
WBC # BLD AUTO: 7.04 THOUSAND/UL (ref 4.31–10.16)

## 2017-01-17 PROCEDURE — C9600 PERC DRUG-EL COR STENT SING: HCPCS | Performed by: INTERNAL MEDICINE

## 2017-01-17 PROCEDURE — C1887 CATHETER, GUIDING: HCPCS | Performed by: INTERNAL MEDICINE

## 2017-01-17 PROCEDURE — C1894 INTRO/SHEATH, NON-LASER: HCPCS | Performed by: INTERNAL MEDICINE

## 2017-01-17 PROCEDURE — 80048 BASIC METABOLIC PNL TOTAL CA: CPT | Performed by: PHYSICIAN ASSISTANT

## 2017-01-17 PROCEDURE — 97530 THERAPEUTIC ACTIVITIES: CPT

## 2017-01-17 PROCEDURE — 97110 THERAPEUTIC EXERCISES: CPT

## 2017-01-17 PROCEDURE — 93454 CORONARY ARTERY ANGIO S&I: CPT | Performed by: INTERNAL MEDICINE

## 2017-01-17 PROCEDURE — 83036 HEMOGLOBIN GLYCOSYLATED A1C: CPT | Performed by: PHYSICIAN ASSISTANT

## 2017-01-17 PROCEDURE — 99153 MOD SED SAME PHYS/QHP EA: CPT | Performed by: INTERNAL MEDICINE

## 2017-01-17 PROCEDURE — 99152 MOD SED SAME PHYS/QHP 5/>YRS: CPT | Performed by: INTERNAL MEDICINE

## 2017-01-17 PROCEDURE — 82948 REAGENT STRIP/BLOOD GLUCOSE: CPT

## 2017-01-17 PROCEDURE — C1760 CLOSURE DEV, VASC: HCPCS | Performed by: INTERNAL MEDICINE

## 2017-01-17 PROCEDURE — C1725 CATH, TRANSLUMIN NON-LASER: HCPCS | Performed by: INTERNAL MEDICINE

## 2017-01-17 PROCEDURE — 83735 ASSAY OF MAGNESIUM: CPT | Performed by: PHYSICIAN ASSISTANT

## 2017-01-17 PROCEDURE — C1769 GUIDE WIRE: HCPCS | Performed by: INTERNAL MEDICINE

## 2017-01-17 PROCEDURE — 027035Z DILATION OF CORONARY ARTERY, ONE ARTERY WITH TWO DRUG-ELUTING INTRALUMINAL DEVICES, PERCUTANEOUS APPROACH: ICD-10-PCS | Performed by: INTERNAL MEDICINE

## 2017-01-17 PROCEDURE — C1874 STENT, COATED/COV W/DEL SYS: HCPCS

## 2017-01-17 PROCEDURE — 85027 COMPLETE CBC AUTOMATED: CPT | Performed by: PHYSICIAN ASSISTANT

## 2017-01-17 RX ORDER — MIDAZOLAM HYDROCHLORIDE 1 MG/ML
INJECTION INTRAMUSCULAR; INTRAVENOUS CODE/TRAUMA/SEDATION MEDICATION
Status: COMPLETED | OUTPATIENT
Start: 2017-01-17 | End: 2017-01-17

## 2017-01-17 RX ORDER — FENTANYL CITRATE 50 UG/ML
INJECTION, SOLUTION INTRAMUSCULAR; INTRAVENOUS CODE/TRAUMA/SEDATION MEDICATION
Status: COMPLETED | OUTPATIENT
Start: 2017-01-17 | End: 2017-01-17

## 2017-01-17 RX ORDER — SODIUM CHLORIDE 9 MG/ML
75 INJECTION, SOLUTION INTRAVENOUS CONTINUOUS
Status: DISCONTINUED | OUTPATIENT
Start: 2017-01-17 | End: 2017-01-18

## 2017-01-17 RX ORDER — NITROGLYCERIN 20 MG/100ML
INJECTION INTRAVENOUS CODE/TRAUMA/SEDATION MEDICATION
Status: COMPLETED | OUTPATIENT
Start: 2017-01-17 | End: 2017-01-17

## 2017-01-17 RX ORDER — LIDOCAINE HYDROCHLORIDE 10 MG/ML
INJECTION, SOLUTION INFILTRATION; PERINEURAL CODE/TRAUMA/SEDATION MEDICATION
Status: COMPLETED | OUTPATIENT
Start: 2017-01-17 | End: 2017-01-17

## 2017-01-17 RX ADMIN — NITROGLYCERIN 400 MCG: 20 INJECTION INTRAVENOUS at 10:44

## 2017-01-17 RX ADMIN — LOSARTAN POTASSIUM 100 MG: 50 TABLET, FILM COATED ORAL at 08:40

## 2017-01-17 RX ADMIN — ONDANSETRON 4 MG: 2 INJECTION INTRAMUSCULAR; INTRAVENOUS at 06:59

## 2017-01-17 RX ADMIN — FENTANYL CITRATE 50 MCG: 50 INJECTION, SOLUTION INTRAMUSCULAR; INTRAVENOUS at 10:25

## 2017-01-17 RX ADMIN — MIDAZOLAM HYDROCHLORIDE 2 MG: 1 INJECTION, SOLUTION INTRAMUSCULAR; INTRAVENOUS at 10:25

## 2017-01-17 RX ADMIN — FAMOTIDINE 20 MG: 20 TABLET ORAL at 08:40

## 2017-01-17 RX ADMIN — IOHEXOL 90 ML: 350 INJECTION, SOLUTION INTRAVENOUS at 10:57

## 2017-01-17 RX ADMIN — ATORVASTATIN CALCIUM 80 MG: 80 TABLET, FILM COATED ORAL at 18:04

## 2017-01-17 RX ADMIN — CLOPIDOGREL BISULFATE 75 MG: 75 TABLET ORAL at 08:40

## 2017-01-17 RX ADMIN — POTASSIUM CHLORIDE 20 MEQ: 1500 TABLET, EXTENDED RELEASE ORAL at 08:40

## 2017-01-17 RX ADMIN — NITROGLYCERIN 400 MCG: 20 INJECTION INTRAVENOUS at 10:32

## 2017-01-17 RX ADMIN — FAMOTIDINE 20 MG: 20 TABLET ORAL at 18:04

## 2017-01-17 RX ADMIN — BIVALIRUDIN 1.75 MG/KG/HR: 250 INJECTION, POWDER, LYOPHILIZED, FOR SOLUTION INTRAVENOUS at 10:31

## 2017-01-17 RX ADMIN — ALLOPURINOL 300 MG: 300 TABLET ORAL at 08:40

## 2017-01-17 RX ADMIN — DULOXETINE HYDROCHLORIDE 30 MG: 30 CAPSULE, DELAYED RELEASE ORAL at 08:40

## 2017-01-17 RX ADMIN — FUROSEMIDE 40 MG: 40 TABLET ORAL at 08:40

## 2017-01-17 RX ADMIN — INSULIN DETEMIR 45 UNITS: 100 INJECTION, SOLUTION SUBCUTANEOUS at 21:23

## 2017-01-17 RX ADMIN — SODIUM CHLORIDE 75 ML/HR: 0.9 INJECTION, SOLUTION INTRAVENOUS at 11:51

## 2017-01-17 RX ADMIN — LIDOCAINE HYDROCHLORIDE 5 ML: 10 INJECTION, SOLUTION INFILTRATION; PERINEURAL at 10:27

## 2017-01-17 RX ADMIN — INSULIN LISPRO 8 UNITS: 100 INJECTION, SOLUTION INTRAVENOUS; SUBCUTANEOUS at 11:53

## 2017-01-17 RX ADMIN — ASPIRIN 81 MG 81 MG: 81 TABLET ORAL at 08:40

## 2017-01-17 RX ADMIN — METOPROLOL SUCCINATE 25 MG: 25 TABLET, EXTENDED RELEASE ORAL at 08:40

## 2017-01-18 ENCOUNTER — APPOINTMENT (INPATIENT)
Dept: RADIOLOGY | Facility: HOSPITAL | Age: 72
DRG: 246 | End: 2017-01-18
Payer: COMMERCIAL

## 2017-01-18 LAB
ANION GAP SERPL CALCULATED.3IONS-SCNC: 11 MMOL/L (ref 4–13)
BUN SERPL-MCNC: 15 MG/DL (ref 5–25)
CALCIUM SERPL-MCNC: 9.4 MG/DL (ref 8.3–10.1)
CHLORIDE SERPL-SCNC: 101 MMOL/L (ref 100–108)
CO2 SERPL-SCNC: 25 MMOL/L (ref 21–32)
CREAT SERPL-MCNC: 0.66 MG/DL (ref 0.6–1.3)
ERYTHROCYTE [DISTWIDTH] IN BLOOD BY AUTOMATED COUNT: 14.4 % (ref 11.6–15.1)
GFR SERPL CREATININE-BSD FRML MDRD: >60 ML/MIN/1.73SQ M
GLUCOSE SERPL-MCNC: 104 MG/DL (ref 65–140)
GLUCOSE SERPL-MCNC: 172 MG/DL (ref 65–140)
GLUCOSE SERPL-MCNC: 195 MG/DL (ref 65–140)
GLUCOSE SERPL-MCNC: 222 MG/DL (ref 65–140)
GLUCOSE SERPL-MCNC: 231 MG/DL (ref 65–140)
HCT VFR BLD AUTO: 38.9 % (ref 34.8–46.1)
HGB BLD-MCNC: 12.8 G/DL (ref 11.5–15.4)
MCH RBC QN AUTO: 30.4 PG (ref 26.8–34.3)
MCHC RBC AUTO-ENTMCNC: 32.9 G/DL (ref 31.4–37.4)
MCV RBC AUTO: 92 FL (ref 82–98)
PLATELET # BLD AUTO: 265 THOUSANDS/UL (ref 149–390)
PMV BLD AUTO: 10.3 FL (ref 8.9–12.7)
POTASSIUM SERPL-SCNC: 3.7 MMOL/L (ref 3.5–5.3)
RBC # BLD AUTO: 4.21 MILLION/UL (ref 3.81–5.12)
SODIUM SERPL-SCNC: 137 MMOL/L (ref 136–145)
WBC # BLD AUTO: 7.78 THOUSAND/UL (ref 4.31–10.16)

## 2017-01-18 PROCEDURE — 80048 BASIC METABOLIC PNL TOTAL CA: CPT | Performed by: INTERNAL MEDICINE

## 2017-01-18 PROCEDURE — 70450 CT HEAD/BRAIN W/O DYE: CPT

## 2017-01-18 PROCEDURE — 85027 COMPLETE CBC AUTOMATED: CPT | Performed by: INTERNAL MEDICINE

## 2017-01-18 PROCEDURE — 97530 THERAPEUTIC ACTIVITIES: CPT

## 2017-01-18 PROCEDURE — 82948 REAGENT STRIP/BLOOD GLUCOSE: CPT

## 2017-01-18 RX ORDER — METOPROLOL SUCCINATE 25 MG/1
25 TABLET, EXTENDED RELEASE ORAL ONCE
Status: COMPLETED | OUTPATIENT
Start: 2017-01-18 | End: 2017-01-18

## 2017-01-18 RX ORDER — METOPROLOL SUCCINATE 50 MG/1
50 TABLET, EXTENDED RELEASE ORAL DAILY
Status: DISCONTINUED | OUTPATIENT
Start: 2017-01-19 | End: 2017-01-21 | Stop reason: HOSPADM

## 2017-01-18 RX ORDER — HEPARIN SODIUM 5000 [USP'U]/ML
5000 INJECTION, SOLUTION INTRAVENOUS; SUBCUTANEOUS EVERY 8 HOURS SCHEDULED
Status: DISCONTINUED | OUTPATIENT
Start: 2017-01-18 | End: 2017-01-21 | Stop reason: HOSPADM

## 2017-01-18 RX ADMIN — FAMOTIDINE 20 MG: 20 TABLET ORAL at 10:17

## 2017-01-18 RX ADMIN — POTASSIUM CHLORIDE 20 MEQ: 1500 TABLET, EXTENDED RELEASE ORAL at 10:17

## 2017-01-18 RX ADMIN — INSULIN DETEMIR 45 UNITS: 100 INJECTION, SOLUTION SUBCUTANEOUS at 10:25

## 2017-01-18 RX ADMIN — LORAZEPAM 0.5 MG: 0.5 TABLET ORAL at 21:31

## 2017-01-18 RX ADMIN — INSULIN DETEMIR 45 UNITS: 100 INJECTION, SOLUTION SUBCUTANEOUS at 22:15

## 2017-01-18 RX ADMIN — ATORVASTATIN CALCIUM 80 MG: 80 TABLET, FILM COATED ORAL at 17:31

## 2017-01-18 RX ADMIN — LOSARTAN POTASSIUM 100 MG: 50 TABLET, FILM COATED ORAL at 10:17

## 2017-01-18 RX ADMIN — CLOPIDOGREL BISULFATE 75 MG: 75 TABLET ORAL at 10:17

## 2017-01-18 RX ADMIN — FAMOTIDINE 20 MG: 20 TABLET ORAL at 17:31

## 2017-01-18 RX ADMIN — METOPROLOL SUCCINATE 25 MG: 25 TABLET, EXTENDED RELEASE ORAL at 15:38

## 2017-01-18 RX ADMIN — DULOXETINE HYDROCHLORIDE 30 MG: 30 CAPSULE, DELAYED RELEASE ORAL at 10:18

## 2017-01-18 RX ADMIN — HEPARIN SODIUM 5000 UNITS: 5000 INJECTION, SOLUTION INTRAVENOUS; SUBCUTANEOUS at 22:15

## 2017-01-18 RX ADMIN — METOPROLOL SUCCINATE 25 MG: 25 TABLET, EXTENDED RELEASE ORAL at 10:17

## 2017-01-18 RX ADMIN — FUROSEMIDE 40 MG: 40 TABLET ORAL at 10:17

## 2017-01-18 RX ADMIN — ASPIRIN 81 MG 81 MG: 81 TABLET ORAL at 10:18

## 2017-01-18 RX ADMIN — ALLOPURINOL 300 MG: 300 TABLET ORAL at 10:18

## 2017-01-18 RX ADMIN — HEPARIN SODIUM 5000 UNITS: 5000 INJECTION, SOLUTION INTRAVENOUS; SUBCUTANEOUS at 15:35

## 2017-01-19 PROBLEM — R41.82 ALTERED MENTAL STATUS: Status: ACTIVE | Noted: 2017-01-19

## 2017-01-19 LAB
GLUCOSE SERPL-MCNC: 159 MG/DL (ref 65–140)
GLUCOSE SERPL-MCNC: 243 MG/DL (ref 65–140)
GLUCOSE SERPL-MCNC: 246 MG/DL (ref 65–140)
GLUCOSE SERPL-MCNC: 88 MG/DL (ref 65–140)

## 2017-01-19 PROCEDURE — 97167 OT EVAL HIGH COMPLEX 60 MIN: CPT

## 2017-01-19 PROCEDURE — G8988 SELF CARE GOAL STATUS: HCPCS

## 2017-01-19 PROCEDURE — 82948 REAGENT STRIP/BLOOD GLUCOSE: CPT

## 2017-01-19 PROCEDURE — 97530 THERAPEUTIC ACTIVITIES: CPT

## 2017-01-19 PROCEDURE — G8987 SELF CARE CURRENT STATUS: HCPCS

## 2017-01-19 RX ADMIN — INSULIN DETEMIR 45 UNITS: 100 INJECTION, SOLUTION SUBCUTANEOUS at 12:20

## 2017-01-19 RX ADMIN — METOPROLOL SUCCINATE 50 MG: 50 TABLET, EXTENDED RELEASE ORAL at 09:45

## 2017-01-19 RX ADMIN — FUROSEMIDE 40 MG: 40 TABLET ORAL at 09:45

## 2017-01-19 RX ADMIN — ATORVASTATIN CALCIUM 80 MG: 80 TABLET, FILM COATED ORAL at 17:12

## 2017-01-19 RX ADMIN — INSULIN DETEMIR 45 UNITS: 100 INJECTION, SOLUTION SUBCUTANEOUS at 21:44

## 2017-01-19 RX ADMIN — LORAZEPAM 0.5 MG: 0.5 TABLET ORAL at 21:43

## 2017-01-19 RX ADMIN — HEPARIN SODIUM 5000 UNITS: 5000 INJECTION, SOLUTION INTRAVENOUS; SUBCUTANEOUS at 21:43

## 2017-01-19 RX ADMIN — ASPIRIN 81 MG 81 MG: 81 TABLET ORAL at 09:45

## 2017-01-19 RX ADMIN — POTASSIUM CHLORIDE 20 MEQ: 1500 TABLET, EXTENDED RELEASE ORAL at 09:45

## 2017-01-19 RX ADMIN — HEPARIN SODIUM 5000 UNITS: 5000 INJECTION, SOLUTION INTRAVENOUS; SUBCUTANEOUS at 05:32

## 2017-01-19 RX ADMIN — FAMOTIDINE 20 MG: 20 TABLET ORAL at 09:45

## 2017-01-19 RX ADMIN — ACETAMINOPHEN 650 MG: 325 TABLET, FILM COATED ORAL at 21:43

## 2017-01-19 RX ADMIN — DULOXETINE HYDROCHLORIDE 30 MG: 30 CAPSULE, DELAYED RELEASE ORAL at 12:20

## 2017-01-19 RX ADMIN — ALLOPURINOL 300 MG: 300 TABLET ORAL at 12:20

## 2017-01-19 RX ADMIN — CLOPIDOGREL BISULFATE 75 MG: 75 TABLET ORAL at 09:45

## 2017-01-19 RX ADMIN — FAMOTIDINE 20 MG: 20 TABLET ORAL at 17:12

## 2017-01-19 RX ADMIN — LOSARTAN POTASSIUM 100 MG: 50 TABLET, FILM COATED ORAL at 09:45

## 2017-01-20 PROBLEM — I21.4 NSTEMI (NON-ST ELEVATED MYOCARDIAL INFARCTION) (HCC): Status: RESOLVED | Noted: 2017-01-15 | Resolved: 2017-01-20

## 2017-01-20 PROBLEM — R41.82 ALTERED MENTAL STATUS: Status: RESOLVED | Noted: 2017-01-19 | Resolved: 2017-01-20

## 2017-01-20 LAB
ALBUMIN SERPL BCP-MCNC: 3.1 G/DL (ref 3.5–5)
ALP SERPL-CCNC: 309 U/L (ref 46–116)
ALT SERPL W P-5'-P-CCNC: 91 U/L (ref 12–78)
AMMONIA PLAS-SCNC: 17 UMOL/L (ref 11–35)
ANION GAP SERPL CALCULATED.3IONS-SCNC: 9 MMOL/L (ref 4–13)
AST SERPL W P-5'-P-CCNC: 70 U/L (ref 5–45)
BASOPHILS # BLD AUTO: 0.07 THOUSANDS/ΜL (ref 0–0.1)
BASOPHILS NFR BLD AUTO: 1 % (ref 0–1)
BILIRUB DIRECT SERPL-MCNC: 0.11 MG/DL (ref 0–0.2)
BILIRUB SERPL-MCNC: 0.38 MG/DL (ref 0.2–1)
BUN SERPL-MCNC: 26 MG/DL (ref 5–25)
CALCIUM SERPL-MCNC: 9.4 MG/DL (ref 8.3–10.1)
CHLORIDE SERPL-SCNC: 104 MMOL/L (ref 100–108)
CO2 SERPL-SCNC: 27 MMOL/L (ref 21–32)
CREAT SERPL-MCNC: 0.9 MG/DL (ref 0.6–1.3)
EOSINOPHIL # BLD AUTO: 0.4 THOUSAND/ΜL (ref 0–0.61)
EOSINOPHIL NFR BLD AUTO: 6 % (ref 0–6)
ERYTHROCYTE [DISTWIDTH] IN BLOOD BY AUTOMATED COUNT: 14.3 % (ref 11.6–15.1)
FOLATE SERPL-MCNC: >20 NG/ML (ref 3.1–17.5)
GFR SERPL CREATININE-BSD FRML MDRD: >60 ML/MIN/1.73SQ M
GLUCOSE SERPL-MCNC: 175 MG/DL (ref 65–140)
GLUCOSE SERPL-MCNC: 244 MG/DL (ref 65–140)
GLUCOSE SERPL-MCNC: 265 MG/DL (ref 65–140)
GLUCOSE SERPL-MCNC: 75 MG/DL (ref 65–140)
GLUCOSE SERPL-MCNC: 96 MG/DL (ref 65–140)
HCT VFR BLD AUTO: 38.4 % (ref 34.8–46.1)
HGB BLD-MCNC: 12.4 G/DL (ref 11.5–15.4)
LYMPHOCYTES # BLD AUTO: 0.84 THOUSANDS/ΜL (ref 0.6–4.47)
LYMPHOCYTES NFR BLD AUTO: 12 % (ref 14–44)
MAGNESIUM SERPL-MCNC: 2.1 MG/DL (ref 1.6–2.6)
MCH RBC QN AUTO: 30.2 PG (ref 26.8–34.3)
MCHC RBC AUTO-ENTMCNC: 32.3 G/DL (ref 31.4–37.4)
MCV RBC AUTO: 94 FL (ref 82–98)
MONOCYTES # BLD AUTO: 0.66 THOUSAND/ΜL (ref 0.17–1.22)
MONOCYTES NFR BLD AUTO: 9 % (ref 4–12)
NEUTROPHILS # BLD AUTO: 5.18 THOUSANDS/ΜL (ref 1.85–7.62)
NEUTS SEG NFR BLD AUTO: 72 % (ref 43–75)
NRBC BLD AUTO-RTO: 0 /100 WBCS
PLATELET # BLD AUTO: 275 THOUSANDS/UL (ref 149–390)
PMV BLD AUTO: 10.2 FL (ref 8.9–12.7)
POTASSIUM SERPL-SCNC: 3.8 MMOL/L (ref 3.5–5.3)
PROT SERPL-MCNC: 7.4 G/DL (ref 6.4–8.2)
RBC # BLD AUTO: 4.1 MILLION/UL (ref 3.81–5.12)
SODIUM SERPL-SCNC: 140 MMOL/L (ref 136–145)
TSH SERPL DL<=0.05 MIU/L-ACNC: 3.15 UIU/ML (ref 0.36–3.74)
VIT B12 SERPL-MCNC: 1843 PG/ML (ref 100–900)
WBC # BLD AUTO: 7.25 THOUSAND/UL (ref 4.31–10.16)

## 2017-01-20 PROCEDURE — 83735 ASSAY OF MAGNESIUM: CPT | Performed by: NURSE PRACTITIONER

## 2017-01-20 PROCEDURE — 84443 ASSAY THYROID STIM HORMONE: CPT | Performed by: NURSE PRACTITIONER

## 2017-01-20 PROCEDURE — 82746 ASSAY OF FOLIC ACID SERUM: CPT | Performed by: NURSE PRACTITIONER

## 2017-01-20 PROCEDURE — 80076 HEPATIC FUNCTION PANEL: CPT | Performed by: NURSE PRACTITIONER

## 2017-01-20 PROCEDURE — 85025 COMPLETE CBC W/AUTO DIFF WBC: CPT | Performed by: NURSE PRACTITIONER

## 2017-01-20 PROCEDURE — 82948 REAGENT STRIP/BLOOD GLUCOSE: CPT

## 2017-01-20 PROCEDURE — 82140 ASSAY OF AMMONIA: CPT | Performed by: NURSE PRACTITIONER

## 2017-01-20 PROCEDURE — 80048 BASIC METABOLIC PNL TOTAL CA: CPT | Performed by: NURSE PRACTITIONER

## 2017-01-20 PROCEDURE — 82607 VITAMIN B-12: CPT | Performed by: NURSE PRACTITIONER

## 2017-01-20 RX ADMIN — LOSARTAN POTASSIUM 100 MG: 50 TABLET, FILM COATED ORAL at 08:51

## 2017-01-20 RX ADMIN — ASPIRIN 81 MG 81 MG: 81 TABLET ORAL at 08:51

## 2017-01-20 RX ADMIN — ALLOPURINOL 300 MG: 300 TABLET ORAL at 08:54

## 2017-01-20 RX ADMIN — INSULIN DETEMIR 45 UNITS: 100 INJECTION, SOLUTION SUBCUTANEOUS at 21:31

## 2017-01-20 RX ADMIN — HEPARIN SODIUM 5000 UNITS: 5000 INJECTION, SOLUTION INTRAVENOUS; SUBCUTANEOUS at 14:11

## 2017-01-20 RX ADMIN — FAMOTIDINE 20 MG: 20 TABLET ORAL at 17:51

## 2017-01-20 RX ADMIN — INSULIN DETEMIR 45 UNITS: 100 INJECTION, SOLUTION SUBCUTANEOUS at 08:49

## 2017-01-20 RX ADMIN — HEPARIN SODIUM 5000 UNITS: 5000 INJECTION, SOLUTION INTRAVENOUS; SUBCUTANEOUS at 21:31

## 2017-01-20 RX ADMIN — FUROSEMIDE 40 MG: 40 TABLET ORAL at 08:58

## 2017-01-20 RX ADMIN — HEPARIN SODIUM 5000 UNITS: 5000 INJECTION, SOLUTION INTRAVENOUS; SUBCUTANEOUS at 06:15

## 2017-01-20 RX ADMIN — METOPROLOL SUCCINATE 50 MG: 50 TABLET, EXTENDED RELEASE ORAL at 08:51

## 2017-01-20 RX ADMIN — CLOPIDOGREL BISULFATE 75 MG: 75 TABLET ORAL at 08:51

## 2017-01-20 RX ADMIN — FAMOTIDINE 20 MG: 20 TABLET ORAL at 08:51

## 2017-01-20 RX ADMIN — POTASSIUM CHLORIDE 20 MEQ: 1500 TABLET, EXTENDED RELEASE ORAL at 08:51

## 2017-01-20 RX ADMIN — DULOXETINE HYDROCHLORIDE 30 MG: 30 CAPSULE, DELAYED RELEASE ORAL at 08:54

## 2017-01-20 RX ADMIN — ATORVASTATIN CALCIUM 80 MG: 80 TABLET, FILM COATED ORAL at 17:51

## 2017-01-21 VITALS
OXYGEN SATURATION: 95 % | BODY MASS INDEX: 31.2 KG/M2 | SYSTOLIC BLOOD PRESSURE: 102 MMHG | DIASTOLIC BLOOD PRESSURE: 55 MMHG | TEMPERATURE: 98.2 F | WEIGHT: 169.53 LBS | HEART RATE: 94 BPM | HEIGHT: 62 IN | RESPIRATION RATE: 18 BRPM

## 2017-01-21 LAB
GLUCOSE SERPL-MCNC: 137 MG/DL (ref 65–140)
GLUCOSE SERPL-MCNC: 158 MG/DL (ref 65–140)
GLUCOSE SERPL-MCNC: 175 MG/DL (ref 65–140)
GLUCOSE SERPL-MCNC: 185 MG/DL (ref 65–140)

## 2017-01-21 PROCEDURE — 82948 REAGENT STRIP/BLOOD GLUCOSE: CPT

## 2017-01-21 PROCEDURE — 97532 HB COGNITIVE SKILLS DEVELOPMENT: CPT

## 2017-01-21 PROCEDURE — 97535 SELF CARE MNGMENT TRAINING: CPT

## 2017-01-21 RX ORDER — ATORVASTATIN CALCIUM 80 MG/1
80 TABLET, FILM COATED ORAL EVERY EVENING
Qty: 30 TABLET | Refills: 0 | Status: SHIPPED | OUTPATIENT
Start: 2017-01-21 | End: 2018-05-15 | Stop reason: ALTCHOICE

## 2017-01-21 RX ORDER — ASPIRIN 81 MG/1
325 TABLET ORAL DAILY
Qty: 120 TABLET | Refills: 0 | Status: SHIPPED | OUTPATIENT
Start: 2017-01-21 | End: 2018-06-11 | Stop reason: DRUGHIGH

## 2017-01-21 RX ORDER — METOPROLOL SUCCINATE 50 MG/1
50 TABLET, EXTENDED RELEASE ORAL DAILY
Qty: 30 TABLET | Refills: 0 | Status: SHIPPED | OUTPATIENT
Start: 2017-01-21 | End: 2018-06-11 | Stop reason: ALTCHOICE

## 2017-01-21 RX ORDER — FUROSEMIDE 40 MG/1
40 TABLET ORAL DAILY
Qty: 30 TABLET | Refills: 0 | Status: SHIPPED | OUTPATIENT
Start: 2017-01-21 | End: 2018-06-11

## 2017-01-21 RX ORDER — CLOPIDOGREL BISULFATE 75 MG/1
75 TABLET ORAL DAILY
Qty: 30 TABLET | Refills: 0 | Status: SHIPPED | OUTPATIENT
Start: 2017-01-21 | End: 2018-06-11 | Stop reason: ALTCHOICE

## 2017-01-21 RX ORDER — POTASSIUM CHLORIDE 20 MEQ/1
20 TABLET, EXTENDED RELEASE ORAL DAILY
Qty: 30 TABLET | Refills: 0 | Status: SHIPPED | OUTPATIENT
Start: 2017-01-21 | End: 2018-06-11

## 2017-01-21 RX ADMIN — ASPIRIN 81 MG 81 MG: 81 TABLET ORAL at 08:54

## 2017-01-21 RX ADMIN — POTASSIUM CHLORIDE 20 MEQ: 1500 TABLET, EXTENDED RELEASE ORAL at 08:55

## 2017-01-21 RX ADMIN — FAMOTIDINE 20 MG: 20 TABLET ORAL at 08:55

## 2017-01-21 RX ADMIN — LORAZEPAM 0.5 MG: 0.5 TABLET ORAL at 14:36

## 2017-01-21 RX ADMIN — CLOPIDOGREL BISULFATE 75 MG: 75 TABLET ORAL at 08:55

## 2017-01-21 RX ADMIN — INSULIN DETEMIR 45 UNITS: 100 INJECTION, SOLUTION SUBCUTANEOUS at 09:12

## 2017-01-21 RX ADMIN — ACETAMINOPHEN 650 MG: 325 TABLET, FILM COATED ORAL at 08:52

## 2017-01-21 RX ADMIN — FAMOTIDINE 20 MG: 20 TABLET ORAL at 17:50

## 2017-01-21 RX ADMIN — HEPARIN SODIUM 5000 UNITS: 5000 INJECTION, SOLUTION INTRAVENOUS; SUBCUTANEOUS at 05:57

## 2017-01-21 RX ADMIN — LOSARTAN POTASSIUM 100 MG: 50 TABLET, FILM COATED ORAL at 08:54

## 2017-01-21 RX ADMIN — ATORVASTATIN CALCIUM 80 MG: 80 TABLET, FILM COATED ORAL at 17:50

## 2017-01-21 RX ADMIN — ALLOPURINOL 300 MG: 300 TABLET ORAL at 08:56

## 2017-01-21 RX ADMIN — DULOXETINE HYDROCHLORIDE 30 MG: 30 CAPSULE, DELAYED RELEASE ORAL at 08:56

## 2017-01-21 RX ADMIN — FUROSEMIDE 40 MG: 40 TABLET ORAL at 08:55

## 2017-01-21 RX ADMIN — METOPROLOL SUCCINATE 50 MG: 50 TABLET, EXTENDED RELEASE ORAL at 08:55

## 2017-01-23 ENCOUNTER — GENERIC CONVERSION - ENCOUNTER (OUTPATIENT)
Dept: OTHER | Facility: OTHER | Age: 72
End: 2017-01-23

## 2017-01-25 ENCOUNTER — GENERIC CONVERSION - ENCOUNTER (OUTPATIENT)
Dept: OTHER | Facility: OTHER | Age: 72
End: 2017-01-25

## 2017-01-26 ENCOUNTER — ALLSCRIPTS OFFICE VISIT (OUTPATIENT)
Dept: OTHER | Facility: OTHER | Age: 72
End: 2017-01-26

## 2017-01-26 DIAGNOSIS — I10 ESSENTIAL (PRIMARY) HYPERTENSION: ICD-10-CM

## 2017-01-30 ENCOUNTER — ALLSCRIPTS OFFICE VISIT (OUTPATIENT)
Dept: OTHER | Facility: OTHER | Age: 72
End: 2017-01-30

## 2017-02-08 ENCOUNTER — GENERIC CONVERSION - ENCOUNTER (OUTPATIENT)
Dept: OTHER | Facility: OTHER | Age: 72
End: 2017-02-08

## 2017-02-08 ENCOUNTER — ALLSCRIPTS OFFICE VISIT (OUTPATIENT)
Dept: OTHER | Facility: OTHER | Age: 72
End: 2017-02-08

## 2017-02-24 ENCOUNTER — ALLSCRIPTS OFFICE VISIT (OUTPATIENT)
Dept: OTHER | Facility: OTHER | Age: 72
End: 2017-02-24

## 2017-02-28 ENCOUNTER — DOCTOR'S OFFICE (OUTPATIENT)
Dept: URBAN - METROPOLITAN AREA CLINIC 136 | Facility: CLINIC | Age: 72
Setting detail: OPHTHALMOLOGY
End: 2017-02-28
Payer: MEDICARE

## 2017-02-28 DIAGNOSIS — E10.36: ICD-10-CM

## 2017-02-28 DIAGNOSIS — E11.36: ICD-10-CM

## 2017-02-28 DIAGNOSIS — E13.36: ICD-10-CM

## 2017-02-28 DIAGNOSIS — E11.3511: ICD-10-CM

## 2017-02-28 DIAGNOSIS — E11.3513: ICD-10-CM

## 2017-02-28 DIAGNOSIS — E11.3512: ICD-10-CM

## 2017-02-28 PROCEDURE — 92134 CPTRZ OPH DX IMG PST SGM RTA: CPT | Performed by: OPHTHALMOLOGY

## 2017-02-28 PROCEDURE — 67028 INJECTION EYE DRUG: CPT | Performed by: OPHTHALMOLOGY

## 2017-02-28 PROCEDURE — 92014 COMPRE OPH EXAM EST PT 1/>: CPT | Performed by: OPHTHALMOLOGY

## 2017-02-28 ASSESSMENT — REFRACTION_CURRENTRX
OS_OVR_VA: 20/
OD_OVR_VA: 20/
OS_OVR_VA: 20/
OD_OVR_VA: 20/
OD_OVR_VA: 20/
OS_OVR_VA: 20/

## 2017-02-28 ASSESSMENT — TEAR BREAK UP TIME (TBUT)
OD_TBUT: 2+
OS_TBUT: 2+

## 2017-02-28 ASSESSMENT — CORNEAL EDEMA CLINICAL DESCRIPTION
OS_CORNEALEDEMA: ABSENT
OD_CORNEALEDEMA: ABSENT

## 2017-02-28 ASSESSMENT — VISUAL ACUITY
OS_BCVA: 20/CF1FT
OD_BCVA: 20/30-2

## 2017-02-28 ASSESSMENT — REFRACTION_MANIFEST
OD_VA3: 20/
OS_VA3: 20/
OD_VA2: 20/
OS_VA3: 20/
OS_VA2: 20/
OD_VA1: 20/
OS_VA1: 20/
OS_VA3: 20/
OS_VA2: 20/
OS_VA2: 20/
OD_VA1: 20/
OS_VA1: 20/
OD_VA2: 20/
OU_VA: 20/
OD_VA1: 20/
OD_VA2: 20/
OU_VA: 20/
OD_VA3: 20/
OU_VA: 20/
OD_VA3: 20/
OS_VA1: 20/

## 2017-02-28 ASSESSMENT — CONFRONTATIONAL VISUAL FIELD TEST (CVF)
OS_FINDINGS: FULL
OD_FINDINGS: CONSTRICTION

## 2017-03-02 ENCOUNTER — GENERIC CONVERSION - ENCOUNTER (OUTPATIENT)
Dept: OTHER | Facility: OTHER | Age: 72
End: 2017-03-02

## 2017-03-28 ENCOUNTER — GENERIC CONVERSION - ENCOUNTER (OUTPATIENT)
Dept: OTHER | Facility: OTHER | Age: 72
End: 2017-03-28

## 2017-03-30 ENCOUNTER — DOCTOR'S OFFICE (OUTPATIENT)
Dept: URBAN - METROPOLITAN AREA CLINIC 136 | Facility: CLINIC | Age: 72
Setting detail: OPHTHALMOLOGY
End: 2017-03-30
Payer: MEDICARE

## 2017-03-30 ENCOUNTER — RX ONLY (RX ONLY)
Age: 72
End: 2017-03-30

## 2017-03-30 DIAGNOSIS — H04.123: ICD-10-CM

## 2017-03-30 DIAGNOSIS — E10.36: ICD-10-CM

## 2017-03-30 DIAGNOSIS — E11.3511: ICD-10-CM

## 2017-03-30 DIAGNOSIS — E11.3513: ICD-10-CM

## 2017-03-30 DIAGNOSIS — E13.36: ICD-10-CM

## 2017-03-30 PROCEDURE — 92014 COMPRE OPH EXAM EST PT 1/>: CPT | Performed by: OPHTHALMOLOGY

## 2017-03-30 PROCEDURE — 92134 CPTRZ OPH DX IMG PST SGM RTA: CPT | Performed by: OPHTHALMOLOGY

## 2017-03-30 PROCEDURE — 67028 INJECTION EYE DRUG: CPT | Performed by: OPHTHALMOLOGY

## 2017-03-30 ASSESSMENT — REFRACTION_CURRENTRX
OS_OVR_VA: 20/
OD_OVR_VA: 20/
OS_OVR_VA: 20/
OS_OVR_VA: 20/
OD_OVR_VA: 20/
OD_OVR_VA: 20/

## 2017-03-30 ASSESSMENT — REFRACTION_MANIFEST
OD_VA2: 20/
OU_VA: 20/
OD_VA2: 20/
OD_VA2: 20/
OD_VA3: 20/
OS_VA1: 20/
OS_VA3: 20/
OS_VA1: 20/
OS_VA3: 20/
OS_VA3: 20/
OD_VA3: 20/
OD_VA1: 20/
OD_VA1: 20/
OS_VA1: 20/
OD_VA1: 20/
OU_VA: 20/
OD_VA3: 20/
OU_VA: 20/
OS_VA2: 20/

## 2017-03-30 ASSESSMENT — TEAR BREAK UP TIME (TBUT)
OS_TBUT: 2+
OD_TBUT: 2+

## 2017-03-30 ASSESSMENT — CONFRONTATIONAL VISUAL FIELD TEST (CVF)
OD_FINDINGS: CONSTRICTION
OS_FINDINGS: FULL

## 2017-03-30 ASSESSMENT — CORNEAL EDEMA CLINICAL DESCRIPTION
OS_CORNEALEDEMA: ABSENT
OD_CORNEALEDEMA: ABSENT

## 2017-03-30 ASSESSMENT — VISUAL ACUITY
OS_BCVA: 20/CF5FT
OD_BCVA: 20/30-2

## 2017-04-11 ENCOUNTER — ALLSCRIPTS OFFICE VISIT (OUTPATIENT)
Dept: OTHER | Facility: OTHER | Age: 72
End: 2017-04-11

## 2017-04-27 ENCOUNTER — RX ONLY (RX ONLY)
Age: 72
End: 2017-04-27

## 2017-04-27 ENCOUNTER — DOCTOR'S OFFICE (OUTPATIENT)
Dept: URBAN - METROPOLITAN AREA CLINIC 136 | Facility: CLINIC | Age: 72
Setting detail: OPHTHALMOLOGY
End: 2017-04-27
Payer: MEDICARE

## 2017-04-27 DIAGNOSIS — E11.3513: ICD-10-CM

## 2017-04-27 DIAGNOSIS — H43.811: ICD-10-CM

## 2017-04-27 DIAGNOSIS — E11.3512: ICD-10-CM

## 2017-04-27 DIAGNOSIS — E11.3511: ICD-10-CM

## 2017-04-27 DIAGNOSIS — E13.36: ICD-10-CM

## 2017-04-27 DIAGNOSIS — H04.123: ICD-10-CM

## 2017-04-27 PROCEDURE — 92134 CPTRZ OPH DX IMG PST SGM RTA: CPT | Performed by: OPHTHALMOLOGY

## 2017-04-27 PROCEDURE — 67028 INJECTION EYE DRUG: CPT | Performed by: OPHTHALMOLOGY

## 2017-04-27 PROCEDURE — 92014 COMPRE OPH EXAM EST PT 1/>: CPT | Performed by: OPHTHALMOLOGY

## 2017-04-27 ASSESSMENT — REFRACTION_MANIFEST
OU_VA: 20/
OS_VA1: 20/
OS_VA2: 20/
OD_VA3: 20/
OS_VA3: 20/
OD_VA3: 20/
OS_VA2: 20/
OU_VA: 20/
OD_VA1: 20/
OS_VA3: 20/
OS_VA2: 20/
OD_VA2: 20/
OU_VA: 20/
OS_VA1: 20/
OD_VA1: 20/
OS_VA3: 20/
OD_VA1: 20/
OD_VA2: 20/
OD_VA3: 20/
OD_VA2: 20/
OS_VA1: 20/

## 2017-04-27 ASSESSMENT — REFRACTION_CURRENTRX
OS_OVR_VA: 20/
OD_OVR_VA: 20/
OD_OVR_VA: 20/
OS_OVR_VA: 20/
OD_OVR_VA: 20/
OS_OVR_VA: 20/

## 2017-04-27 ASSESSMENT — CONFRONTATIONAL VISUAL FIELD TEST (CVF)
OS_FINDINGS: FULL
OD_FINDINGS: CONSTRICTION

## 2017-04-27 ASSESSMENT — VISUAL ACUITY
OD_BCVA: 20/40-2
OS_BCVA: 20/CF5FT

## 2017-04-27 ASSESSMENT — TEAR BREAK UP TIME (TBUT)
OS_TBUT: 2+
OD_TBUT: 2+

## 2017-04-27 ASSESSMENT — CORNEAL EDEMA CLINICAL DESCRIPTION
OS_CORNEALEDEMA: ABSENT
OD_CORNEALEDEMA: ABSENT

## 2017-05-04 ENCOUNTER — APPOINTMENT (OUTPATIENT)
Dept: LAB | Facility: CLINIC | Age: 72
End: 2017-05-04
Payer: COMMERCIAL

## 2017-05-04 DIAGNOSIS — K59.00 CONSTIPATION: ICD-10-CM

## 2017-05-04 DIAGNOSIS — E78.5 HYPERLIPIDEMIA: ICD-10-CM

## 2017-05-04 DIAGNOSIS — M79.673 PAIN OF FOOT: ICD-10-CM

## 2017-05-04 DIAGNOSIS — R32 URINARY INCONTINENCE: ICD-10-CM

## 2017-05-04 DIAGNOSIS — I50.9 HEART FAILURE (HCC): ICD-10-CM

## 2017-05-04 DIAGNOSIS — M81.0 AGE-RELATED OSTEOPOROSIS WITHOUT CURRENT PATHOLOGICAL FRACTURE: ICD-10-CM

## 2017-05-04 DIAGNOSIS — E11.9 TYPE 2 DIABETES MELLITUS WITHOUT COMPLICATIONS (HCC): ICD-10-CM

## 2017-05-04 DIAGNOSIS — F32.9 MAJOR DEPRESSIVE DISORDER, SINGLE EPISODE: ICD-10-CM

## 2017-05-04 DIAGNOSIS — I50.32 CHRONIC DIASTOLIC HEART FAILURE (HCC): ICD-10-CM

## 2017-05-04 DIAGNOSIS — I10 ESSENTIAL (PRIMARY) HYPERTENSION: ICD-10-CM

## 2017-05-04 DIAGNOSIS — I25.10 ATHEROSCLEROTIC HEART DISEASE OF NATIVE CORONARY ARTERY WITHOUT ANGINA PECTORIS: ICD-10-CM

## 2017-05-04 DIAGNOSIS — C54.1 MALIGNANT NEOPLASM OF ENDOMETRIUM (HCC): ICD-10-CM

## 2017-05-04 DIAGNOSIS — I73.9 PERIPHERAL VASCULAR DISEASE (HCC): ICD-10-CM

## 2017-05-04 DIAGNOSIS — Z87.891 PERSONAL HISTORY OF NICOTINE DEPENDENCE: ICD-10-CM

## 2017-05-04 DIAGNOSIS — Z12.31 ENCOUNTER FOR SCREENING MAMMOGRAM FOR MALIGNANT NEOPLASM OF BREAST: ICD-10-CM

## 2017-05-04 DIAGNOSIS — R26.9 ABNORMALITY OF GAIT AND MOBILITY: ICD-10-CM

## 2017-05-04 DIAGNOSIS — L65.9 NONSCARRING HAIR LOSS: ICD-10-CM

## 2017-05-04 DIAGNOSIS — F41.9 ANXIETY DISORDER: ICD-10-CM

## 2017-05-04 LAB
ALBUMIN SERPL BCP-MCNC: 3 G/DL (ref 3.5–5)
ALP SERPL-CCNC: 147 U/L (ref 46–116)
ALT SERPL W P-5'-P-CCNC: 39 U/L (ref 12–78)
ANION GAP SERPL CALCULATED.3IONS-SCNC: 9 MMOL/L (ref 4–13)
AST SERPL W P-5'-P-CCNC: 18 U/L (ref 5–45)
BACTERIA UR QL AUTO: NORMAL /HPF
BASOPHILS # BLD AUTO: 0.03 THOUSANDS/ΜL (ref 0–0.1)
BASOPHILS NFR BLD AUTO: 1 % (ref 0–1)
BILIRUB SERPL-MCNC: 0.29 MG/DL (ref 0.2–1)
BILIRUB UR QL STRIP: NEGATIVE
BUN SERPL-MCNC: 21 MG/DL (ref 5–25)
CALCIUM SERPL-MCNC: 9.4 MG/DL (ref 8.3–10.1)
CHLORIDE SERPL-SCNC: 99 MMOL/L (ref 100–108)
CHOLEST SERPL-MCNC: 179 MG/DL (ref 50–200)
CLARITY UR: CLEAR
CO2 SERPL-SCNC: 28 MMOL/L (ref 21–32)
COLOR UR: YELLOW
CREAT SERPL-MCNC: 0.76 MG/DL (ref 0.6–1.3)
EOSINOPHIL # BLD AUTO: 0.15 THOUSAND/ΜL (ref 0–0.61)
EOSINOPHIL NFR BLD AUTO: 3 % (ref 0–6)
ERYTHROCYTE [DISTWIDTH] IN BLOOD BY AUTOMATED COUNT: 14.6 % (ref 11.6–15.1)
EST. AVERAGE GLUCOSE BLD GHB EST-MCNC: 260 MG/DL
GFR SERPL CREATININE-BSD FRML MDRD: >60 ML/MIN/1.73SQ M
GLUCOSE P FAST SERPL-MCNC: 391 MG/DL (ref 65–99)
GLUCOSE UR STRIP-MCNC: ABNORMAL MG/DL
HBA1C MFR BLD: 10.7 % (ref 4.2–6.3)
HCT VFR BLD AUTO: 37.8 % (ref 34.8–46.1)
HDLC SERPL-MCNC: 34 MG/DL (ref 40–60)
HGB BLD-MCNC: 12.3 G/DL (ref 11.5–15.4)
HGB UR QL STRIP.AUTO: NEGATIVE
HYALINE CASTS #/AREA URNS LPF: NORMAL /LPF
KETONES UR STRIP-MCNC: ABNORMAL MG/DL
LDLC SERPL CALC-MCNC: 81 MG/DL (ref 0–100)
LEUKOCYTE ESTERASE UR QL STRIP: NEGATIVE
LYMPHOCYTES # BLD AUTO: 1.08 THOUSANDS/ΜL (ref 0.6–4.47)
LYMPHOCYTES NFR BLD AUTO: 18 % (ref 14–44)
MCH RBC QN AUTO: 30.9 PG (ref 26.8–34.3)
MCHC RBC AUTO-ENTMCNC: 32.5 G/DL (ref 31.4–37.4)
MCV RBC AUTO: 95 FL (ref 82–98)
MONOCYTES # BLD AUTO: 0.34 THOUSAND/ΜL (ref 0.17–1.22)
MONOCYTES NFR BLD AUTO: 6 % (ref 4–12)
NEUTROPHILS # BLD AUTO: 4.22 THOUSANDS/ΜL (ref 1.85–7.62)
NEUTS SEG NFR BLD AUTO: 72 % (ref 43–75)
NITRITE UR QL STRIP: NEGATIVE
NON-SQ EPI CELLS URNS QL MICRO: NORMAL /HPF
NRBC BLD AUTO-RTO: 0 /100 WBCS
PH UR STRIP.AUTO: 5.5 [PH] (ref 4.5–8)
PLATELET # BLD AUTO: 235 THOUSANDS/UL (ref 149–390)
PMV BLD AUTO: 10 FL (ref 8.9–12.7)
POTASSIUM SERPL-SCNC: 4.2 MMOL/L (ref 3.5–5.3)
PROT SERPL-MCNC: 7.1 G/DL (ref 6.4–8.2)
PROT UR STRIP-MCNC: NEGATIVE MG/DL
RBC # BLD AUTO: 3.98 MILLION/UL (ref 3.81–5.12)
RBC #/AREA URNS AUTO: NORMAL /HPF
SODIUM SERPL-SCNC: 136 MMOL/L (ref 136–145)
SP GR UR STRIP.AUTO: 1.03 (ref 1–1.03)
TRIGL SERPL-MCNC: 322 MG/DL
TSH SERPL DL<=0.05 MIU/L-ACNC: 1.3 UIU/ML (ref 0.36–3.74)
URATE SERPL-MCNC: 5.1 MG/DL (ref 2–6.8)
UROBILINOGEN UR QL STRIP.AUTO: 0.2 E.U./DL
WBC # BLD AUTO: 5.93 THOUSAND/UL (ref 4.31–10.16)
WBC #/AREA URNS AUTO: NORMAL /HPF

## 2017-05-04 PROCEDURE — 83036 HEMOGLOBIN GLYCOSYLATED A1C: CPT

## 2017-05-04 PROCEDURE — 80061 LIPID PANEL: CPT

## 2017-05-04 PROCEDURE — 36415 COLL VENOUS BLD VENIPUNCTURE: CPT

## 2017-05-04 PROCEDURE — 81001 URINALYSIS AUTO W/SCOPE: CPT

## 2017-05-04 PROCEDURE — 85025 COMPLETE CBC W/AUTO DIFF WBC: CPT

## 2017-05-04 PROCEDURE — 84550 ASSAY OF BLOOD/URIC ACID: CPT

## 2017-05-04 PROCEDURE — 80053 COMPREHEN METABOLIC PANEL: CPT

## 2017-05-04 PROCEDURE — 84443 ASSAY THYROID STIM HORMONE: CPT

## 2017-05-05 ENCOUNTER — GENERIC CONVERSION - ENCOUNTER (OUTPATIENT)
Dept: OTHER | Facility: OTHER | Age: 72
End: 2017-05-05

## 2017-05-19 ENCOUNTER — ALLSCRIPTS OFFICE VISIT (OUTPATIENT)
Dept: OTHER | Facility: OTHER | Age: 72
End: 2017-05-19

## 2017-05-23 ENCOUNTER — ALLSCRIPTS OFFICE VISIT (OUTPATIENT)
Dept: OTHER | Facility: OTHER | Age: 72
End: 2017-05-23

## 2017-06-01 ENCOUNTER — ALLSCRIPTS OFFICE VISIT (OUTPATIENT)
Dept: OTHER | Facility: OTHER | Age: 72
End: 2017-06-01

## 2017-06-05 ENCOUNTER — GENERIC CONVERSION - ENCOUNTER (OUTPATIENT)
Dept: OTHER | Facility: OTHER | Age: 72
End: 2017-06-05

## 2017-06-08 ENCOUNTER — DOCTOR'S OFFICE (OUTPATIENT)
Dept: URBAN - METROPOLITAN AREA CLINIC 136 | Facility: CLINIC | Age: 72
Setting detail: OPHTHALMOLOGY
End: 2017-06-08
Payer: MEDICARE

## 2017-06-08 ENCOUNTER — RX ONLY (RX ONLY)
Age: 72
End: 2017-06-08

## 2017-06-08 DIAGNOSIS — E13.36: ICD-10-CM

## 2017-06-08 DIAGNOSIS — E10.36: ICD-10-CM

## 2017-06-08 DIAGNOSIS — E11.36: ICD-10-CM

## 2017-06-08 DIAGNOSIS — H04.123: ICD-10-CM

## 2017-06-08 DIAGNOSIS — E11.3513: ICD-10-CM

## 2017-06-08 DIAGNOSIS — E11.3512: ICD-10-CM

## 2017-06-08 PROCEDURE — 92134 CPTRZ OPH DX IMG PST SGM RTA: CPT | Performed by: OPHTHALMOLOGY

## 2017-06-08 PROCEDURE — SPECPHARM SPECIALTY PHARMACY DRUG: Performed by: OPHTHALMOLOGY

## 2017-06-08 PROCEDURE — 92014 COMPRE OPH EXAM EST PT 1/>: CPT | Performed by: OPHTHALMOLOGY

## 2017-06-08 PROCEDURE — 67028 INJECTION EYE DRUG: CPT | Performed by: OPHTHALMOLOGY

## 2017-06-08 ASSESSMENT — REFRACTION_MANIFEST
OD_VA1: 20/
OS_VA1: 20/
OS_VA3: 20/
OS_VA1: 20/
OS_VA2: 20/
OU_VA: 20/
OD_VA3: 20/
OD_VA3: 20/
OU_VA: 20/
OS_VA1: 20/
OD_VA2: 20/
OD_VA3: 20/
OS_VA3: 20/
OD_VA2: 20/
OU_VA: 20/
OS_VA3: 20/
OD_VA2: 20/

## 2017-06-08 ASSESSMENT — REFRACTION_CURRENTRX
OS_OVR_VA: 20/
OS_OVR_VA: 20/
OD_OVR_VA: 20/
OS_OVR_VA: 20/
OD_OVR_VA: 20/
OD_OVR_VA: 20/

## 2017-06-08 ASSESSMENT — VISUAL ACUITY
OS_BCVA: 20/CF5FT
OD_BCVA: 20/40-2

## 2017-06-08 ASSESSMENT — TEAR BREAK UP TIME (TBUT)
OS_TBUT: 2+
OD_TBUT: 2+

## 2017-06-08 ASSESSMENT — CORNEAL EDEMA CLINICAL DESCRIPTION
OS_CORNEALEDEMA: ABSENT
OD_CORNEALEDEMA: ABSENT

## 2017-06-08 ASSESSMENT — CONFRONTATIONAL VISUAL FIELD TEST (CVF)
OS_FINDINGS: FULL
OD_FINDINGS: CONSTRICTION

## 2017-06-14 ENCOUNTER — GENERIC CONVERSION - ENCOUNTER (OUTPATIENT)
Dept: OTHER | Facility: OTHER | Age: 72
End: 2017-06-14

## 2017-06-19 ENCOUNTER — ALLSCRIPTS OFFICE VISIT (OUTPATIENT)
Dept: OTHER | Facility: OTHER | Age: 72
End: 2017-06-19

## 2017-06-30 ENCOUNTER — ALLSCRIPTS OFFICE VISIT (OUTPATIENT)
Dept: OTHER | Facility: OTHER | Age: 72
End: 2017-06-30

## 2017-07-03 ENCOUNTER — DOCTOR'S OFFICE (OUTPATIENT)
Dept: URBAN - METROPOLITAN AREA CLINIC 136 | Facility: CLINIC | Age: 72
Setting detail: OPHTHALMOLOGY
End: 2017-07-03
Payer: MEDICARE

## 2017-07-03 ENCOUNTER — GENERIC CONVERSION - ENCOUNTER (OUTPATIENT)
Dept: OTHER | Facility: OTHER | Age: 72
End: 2017-07-03

## 2017-07-03 DIAGNOSIS — H25.013: ICD-10-CM

## 2017-07-03 DIAGNOSIS — E11.3513: ICD-10-CM

## 2017-07-03 DIAGNOSIS — H04.123: ICD-10-CM

## 2017-07-03 DIAGNOSIS — H43.811: ICD-10-CM

## 2017-07-03 PROCEDURE — 92014 COMPRE OPH EXAM EST PT 1/>: CPT | Performed by: OPHTHALMOLOGY

## 2017-07-03 ASSESSMENT — REFRACTION_MANIFEST
OU_VA: 20/
OU_VA: 20/
OD_VA1: 20/
OS_VA3: 20/
OD_VA2: 20/
OU_VA: 20/
OS_VA1: 20/
OD_VA3: 20/
OS_VA3: 20/
OS_VA3: 20/
OS_VA2: 20/
OD_VA3: 20/
OS_VA1: 20/
OS_VA1: 20/
OD_VA2: 20/
OD_VA1: 20/
OS_VA2: 20/
OD_VA3: 20/
OD_VA1: 20/
OD_VA2: 20/
OS_VA2: 20/

## 2017-07-03 ASSESSMENT — CORNEAL EDEMA CLINICAL DESCRIPTION
OD_CORNEALEDEMA: ABSENT
OS_CORNEALEDEMA: ABSENT

## 2017-07-03 ASSESSMENT — REFRACTION_CURRENTRX
OS_OVR_VA: 20/
OD_OVR_VA: 20/
OS_OVR_VA: 20/
OD_OVR_VA: 20/
OS_OVR_VA: 20/
OD_OVR_VA: 20/

## 2017-07-03 ASSESSMENT — TEAR BREAK UP TIME (TBUT)
OD_TBUT: 2+
OS_TBUT: 2+

## 2017-07-03 ASSESSMENT — VISUAL ACUITY
OD_BCVA: 20/40-2
OS_BCVA: 20/CF5FT

## 2017-07-03 ASSESSMENT — CONFRONTATIONAL VISUAL FIELD TEST (CVF)
OD_FINDINGS: CONSTRICTION
OS_FINDINGS: FULL

## 2017-07-05 ENCOUNTER — GENERIC CONVERSION - ENCOUNTER (OUTPATIENT)
Dept: OTHER | Facility: OTHER | Age: 72
End: 2017-07-05

## 2017-07-06 ENCOUNTER — ALLSCRIPTS OFFICE VISIT (OUTPATIENT)
Dept: OTHER | Facility: OTHER | Age: 72
End: 2017-07-06

## 2017-07-26 ENCOUNTER — ALLSCRIPTS OFFICE VISIT (OUTPATIENT)
Dept: OTHER | Facility: OTHER | Age: 72
End: 2017-07-26

## 2017-07-26 ENCOUNTER — GENERIC CONVERSION - ENCOUNTER (OUTPATIENT)
Dept: OTHER | Facility: OTHER | Age: 72
End: 2017-07-26

## 2017-07-27 ENCOUNTER — RX ONLY (RX ONLY)
Age: 72
End: 2017-07-27

## 2017-07-27 ENCOUNTER — DOCTOR'S OFFICE (OUTPATIENT)
Dept: URBAN - METROPOLITAN AREA CLINIC 136 | Facility: CLINIC | Age: 72
Setting detail: OPHTHALMOLOGY
End: 2017-07-27
Payer: MEDICARE

## 2017-07-27 DIAGNOSIS — H25.013: ICD-10-CM

## 2017-07-27 DIAGNOSIS — E11.3511: ICD-10-CM

## 2017-07-27 DIAGNOSIS — H43.811: ICD-10-CM

## 2017-07-27 DIAGNOSIS — H04.123: ICD-10-CM

## 2017-07-27 DIAGNOSIS — E11.3513: ICD-10-CM

## 2017-07-27 PROCEDURE — 92014 COMPRE OPH EXAM EST PT 1/>: CPT | Performed by: OPHTHALMOLOGY

## 2017-07-27 PROCEDURE — 92134 CPTRZ OPH DX IMG PST SGM RTA: CPT | Performed by: OPHTHALMOLOGY

## 2017-07-27 PROCEDURE — SPECPHARM SPECIALTY PHARMACY DRUG: Performed by: OPHTHALMOLOGY

## 2017-07-27 PROCEDURE — 67028 INJECTION EYE DRUG: CPT | Performed by: OPHTHALMOLOGY

## 2017-07-27 ASSESSMENT — CORNEAL EDEMA CLINICAL DESCRIPTION
OS_CORNEALEDEMA: ABSENT
OD_CORNEALEDEMA: ABSENT

## 2017-07-27 ASSESSMENT — REFRACTION_MANIFEST
OD_VA3: 20/
OS_VA3: 20/
OD_VA1: 20/
OD_VA2: 20/
OS_VA1: 20/
OS_VA1: 20/
OS_VA2: 20/
OS_VA3: 20/
OD_VA3: 20/
OD_VA2: 20/
OD_VA3: 20/
OS_VA1: 20/
OU_VA: 20/
OD_VA1: 20/
OD_VA2: 20/
OS_VA2: 20/
OU_VA: 20/
OD_VA1: 20/
OS_VA3: 20/
OS_VA2: 20/
OU_VA: 20/

## 2017-07-27 ASSESSMENT — CONFRONTATIONAL VISUAL FIELD TEST (CVF)
OS_FINDINGS: FULL
OD_FINDINGS: CONSTRICTION

## 2017-07-27 ASSESSMENT — TEAR BREAK UP TIME (TBUT)
OD_TBUT: 2+
OS_TBUT: 2+

## 2017-07-27 ASSESSMENT — REFRACTION_CURRENTRX
OS_OVR_VA: 20/
OD_OVR_VA: 20/
OS_OVR_VA: 20/
OD_OVR_VA: 20/
OS_OVR_VA: 20/
OD_OVR_VA: 20/

## 2017-07-27 ASSESSMENT — VISUAL ACUITY
OS_BCVA: 20/CF5FT
OD_BCVA: 20/30

## 2017-08-07 DIAGNOSIS — E78.5 HYPERLIPIDEMIA: ICD-10-CM

## 2017-08-07 DIAGNOSIS — E11.39 TYPE 2 DIABETES MELLITUS WITH OTHER DIABETIC OPHTHALMIC COMPLICATION (HCC): ICD-10-CM

## 2017-08-07 DIAGNOSIS — E11.65 TYPE 2 DIABETES MELLITUS WITH HYPERGLYCEMIA (HCC): ICD-10-CM

## 2017-08-07 DIAGNOSIS — I10 ESSENTIAL (PRIMARY) HYPERTENSION: ICD-10-CM

## 2017-09-05 ENCOUNTER — ALLSCRIPTS OFFICE VISIT (OUTPATIENT)
Dept: OTHER | Facility: OTHER | Age: 72
End: 2017-09-05

## 2017-10-17 ENCOUNTER — ALLSCRIPTS OFFICE VISIT (OUTPATIENT)
Dept: OTHER | Facility: OTHER | Age: 72
End: 2017-10-17

## 2017-10-25 NOTE — PROGRESS NOTES
Assessment  1  Endometrial cancer (182 0) (C54 1)    Plan  Endometrial cancer    · Follow-up visit in 6 months Evaluation and Treatment  Follow-up  Status: Hold For -  Scheduling  Requested for: 30Gea1187   Ordered; For: Endometrial cancer; Ordered By: Ifeoma Bolden Performed:  Due: 72ZYM3641    Discussion/Summary  Discussion Summary: At least stage IIIB FIGO grade 1 endometrial cancer: Full staging not performed given performance status  She had a CT scan of the chest, abdomen and pelvis with no evidence of metastatic disease  She then went on to receive adjuvant pelvic radiotherapy and vaginal brachytherapy  She completed in May 2016 and tolerated well   has some difficulties with transportation and multiple serious medical comorbidities  We will do surveillance visits at 6 month intervals  She will contact us sooner if she develops any new symptoms  Patient Guardian understands agrees: The treatment plan was reviewed with the patient/guardian  The patient/guardian understands and agrees with the treatment plan      Chief Complaint  Chief Complaint Free Text Note Form: Endometrial cancer surveillance  History of Present Illness  Problem [facility]:   #1  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  Ventral hernia (repaired with bioprosthetic)  Multiple comorbidities: Lower extremity neuropathy/spinal stenosis (wheelchair-bound), diabetes, etc Persistent, daily nausea  Previous Therapy:   #1  January 18, 2016: Robotic assisted total laparoscopic hysterectomy, bilateral salpingo-oophorectomy, ventral hernia repair with bioprosthetic, cystoscopy, lymphadenectomy not performed given patient's performance status, etc, no gross extrauterine disease   Final pathology consistent with 8 3 cm FIGO grade 1 tumor, invasion 12 out of 14 mm, positive lymphovascular space invasion, positive cervical stromal invasion, positive vaginal and parametrial involvement with positive cervicovaginal margin  No gross residual disease  Whole pelvic radiotherapy (3/29-5/3/2016): 4,500 cGy/25 Fx and vaginal brachytherapy (last on 5/26/2016): 1,500 cGy / 3 Fx) completed late May 2016  Free Text HPI: Today for follow-up  She has been lost to follow-up for approximately one year due to difficulties with transportation  At last visit in September 2016, she complained of nausea and abdominal discomfort  CT scan showed no evidence of disease  Now, has no complaints  Denies vaginal bleeding, drainage or discharge  Normal bowel bladder function / At baseline  Review of [facility]:   Constitutional: No fever, no recent weight gain, no chills, not feeling tired and no recent weight loss  Cardiovascular: No chest pain, no lower extremity edema  Respiratory: No shortness of breath  Gastrointestinal: No abdominal pain, no constipation, no nausea or vomiting, no bloody stools  Genitourinary: No complaints of dysuria, no incontinence, no pelvic pain, no dysmenorrhea, no vaginal discharge or bleeding  Active Problems  1  Acute congestive heart failure (428 0) (I50 9)   2  Alopecia (704 00) (L65 9)   3  Anxiety (300 00) (F41 9)   4  Bladder incontinence (788 30) (R32)   5  CAD (coronary artery disease) (414 00) (I25 10)   6  Chronic diastolic CHF (congestive heart failure) (428 32,428 0) (I50 32)   7  Claudication (443 9) (I73 9)   8  Constipation (564 00) (K59 00)   9  Depression (311) (F32 9)   10  Diabetes mellitus (250 00) (E11 9)   11  Endometrial cancer (182 0) (C54 1)   12  Foot pain (729 5) (M79 673)   13  Former smoker (Z87 891)   15  Gait disturbance (781 2) (R26 9)   15  Hyperlipidemia (272 4) (E78 5)   16  Hypertension (401 9) (I10)   17  Meralgia paresthetica of right side (355 1) (G57 11)   18  Nausea (787 02) (R11 0)   19  NSTEMI, initial episode of care (410 71) (I21 4)   20  Osteoporosis (733 00) (M81 0)   21  Peripheral neuropathy (356 9) (G62 9)   22   Primary localized osteoarthritis of right knee (715 16) (M17 11)   23  Psoriasis (696 1) (L40 9)   24  Quadriceps weakness (728 87) (M62 81)   25  Spinal stenosis (724 00) (M48 00)   26  TIA (transient ischemic attack) (435 9) (G45 9)   27  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, with long-term    current use of insulin (250 52,V58 67) (E11 39,E11 65,Z79  4)    Past Medical History  1  History of Acute knee pain (719 46) (M25 569)   2  History of Acute leg pain (729 5) (M79 606)   3  History of Acute urinary tract infection (599 0) (N39 0)   4  History of Bilateral numbness and tingling of arms and legs (782 0) (R20 0,R20 2)   5  History of Foot pain (729 5) (M79 673)   6  History of Gait disturbance (781 2) (R26 9)   7  History of Gout (274 9) (M10 9)   8  History of  3   9  History of abdominal pain (V13 89) (Z87 898)   10  History of acute sinusitis (V12 69) (Z87 09)   11  History of allergic rhinitis (V12 69) (Z87 09)   12  History of dizziness (V13 89) (Z87 898)   13  History of headache (V13 89) (Z87 898)   14  History of hyperlipidemia (V12 29) (Z86 39)   15  History of nausea (V12 79) (Z87 898)   16  History of psoriatic arthritis (V13 4) (Z87 2)   17  History of sciatica (V12 49) (Z86 69)   18  History of stroke (V12 54) (Z86 73)   19  History of urinary frequency (V13 09) (Z87 898)   20  History of weakness (V13 89) (Z87 898)   21  History of Knee pain (719 46) (M25 569)   22  History of Lumbar radiculopathy (724 4) (M54 16)   23  History of Pain of lower extremity (729 5) (M79 606)   24  Personal history of arthritis (V13 4) (Z87 39)   25  History of Postmenopausal bleeding (627 1) (N95 0)   26  History of Screening for osteoporosis (V82 81) (Z13 820)   27  History of Urinary tract infection (599 0) (N39 0)   28  History of Vertigo following CVA (cerebrovascular accident) (425 47) (K08 579,A60)  Active Problems And Past Medical History Reviewed:    The active problems and past medical history were reviewed and updated today  Surgical History  1  History of Cardiac Cath Lesion 1, 1st Adjunct Treat Device: Stent   2  History of Cholecystectomy Laparoscopic   3  History of Laparoscopy With Total Hysterectomy   4  History of Pelvic Examination Under Anesthesia   5  History of Salpingo-oophorectomy Bilateral   6  History of Tubal Ligation   7  History of Umbilical Hernia Repair   8  History of Uterine Myomectomy  Surgical History Reviewed: The surgical history was reviewed and updated today  Family History  Mother    1  Family history of cardiac disorder (V17 49) (Z82 49)   2  Family history of hypertension (V17 49) (Z82 49)  Father    3  Family history of diabetes mellitus (V18 0) (Z83 3)   4  Family history of hypertension (V17 49) (Z82 49)   5  Family history of malignant neoplasm (V16 9) (Z80 9)   6  Family history of malignant neoplasm of prostate (V16 42) (Z80 45)  Family History    7  Family history of arthritis (V17 7) (Z82 61)   8  Family history of osteoporosis (V17 81) (Z82 62)  Family History Reviewed: The family history was reviewed and updated today  Social History   · Former smoker (Y12 098)   · No drug use   · Social alcohol use (Z78 9)  Social History Reviewed: The social history was reviewed and updated today  The social history was reviewed and is unchanged  Current Meds   1  Allopurinol 300 MG Oral Tablet; TAKE 1 TABLET DAILY; Therapy: 29UCL2381 to (Evaluate:22Jan2018)  Requested for: 30Hni1325; Last   Rx:83Jze5998 Ordered   2  Aspirin 81 MG Oral Tablet Delayed Release; take 1 tablet by mouth daily; Therapy: 64QQA6084 to (Evaluate:54Rri1187); Last Rx:19Jun2017 Ordered   3  Atorvastatin Calcium 80 MG Oral Tablet; TAKE 1 TABLET AT BEDTIME  Requested for:   14Zmf8115; Last Rx:48Gmr9619 Ordered   4  BD Pen Needle Jodi U/F 32G X 4 MM Miscellaneous; USE 4 TIMES A DAY;    Therapy: 42GLW7766 to (Selvin Clement)  Requested for: 94BNX6491; Last   BP:94ADB2148 Ordered   5  Clopidogrel Bisulfate 75 MG Oral Tablet; TAKE 1 TABLET DAILY  Requested for:   99Qcq1980; Last Rx:47Kfn5319 Ordered   6  DULoxetine HCl - 60 MG Oral Capsule Delayed Release Particles; take 1 capsule daily; Therapy: 77XRN3347 to (Evaluate:22Jan2018)  Requested for: 24Lst6122; Last   Rx:41Tiw0458 Ordered   7  Furosemide 40 MG Oral Tablet; TAKE 1 TABLET DAILY  Requested for: 35Oey1564; Last   Rx:89Yaj7478 Ordered   8  Gabapentin 300 MG Oral Capsule; TAKE 1 CAPSULE TWICE DAILY; Therapy: 11NNH7010 to (Evaluate:22Jan2018)  Requested for: 94Dku6621; Last   Rx:88Jhi2978 Ordered   9  HumaLOG KwikPen 100 UNIT/ML Subcutaneous Solution Pen-injector; 15 units before   meals; Therapy: 34JTH8065 to (Milton Cabrales)  Requested for: 14EXF4619; Last   Rx:90Ygk6232 Ordered   10  Lantus SoloStar 100 UNIT/ML Subcutaneous Solution Pen-injector; USE AS DIRECTED  50 units at bedtime; Therapy: 12Ttn0706 to (Milton Cabrales)  Requested for: 27RMQ0284; Last    Rx:03Ehx6051 Ordered   11  LORazepam 0 5 MG Oral Tablet; 1 PO BID PRN; Therapy: 68LDR6500 to (Milton Cabrales)  Requested for: 87ICN5736; Last    Rx:69Dgf7622 Ordered   12  Losartan Potassium 100 MG Oral Tablet; Take 1 tablet daily; Therapy: 35MGT9605 to (Evaluate:22Jan2018)  Requested for: 43Hwg7178; Last    Rx:22Ost3540 Ordered   13  Metoprolol Succinate ER 50 MG Oral Tablet Extended Release 24 Hour; TAKE 1 TABLET    DAILY  Requested for: 80Ftw6643; Last Rx:34Vgt1130 Ordered   14  Multi Vitamin/Minerals TABS; TAKE 1 TABLET DAILY; Therapy: (Rob Ortega) to Recorded   15  Potassium Chloride ER 20 MEQ Oral Tablet Extended Release; One daily  Requested    for: 10Axc3743; Last Rx:44Dsu3522 Ordered   16  Syringe 30 ML Miscellaneous; USE AS DIRECTED; Therapy: 08BZE3563 to (Last Rx:05Jun2017)  Requested for: 67DEF9766 Ordered  Medication List Reviewed: The medication list was reviewed and updated today  Allergies  1   Sulfa Drugs    Vitals  Vital Signs    Recorded: 05Sep2017 11:28AM   Temperature 97 F, Oral   Heart Rate 76, L Radial   Pulse Quality Normal, L Radial   Respiration Quality Normal   Respiration 16   Systolic 836, LUE, Sitting   Diastolic 80, LUE, Sitting   Height 5 ft 1 in   Weight 186 lb 8 oz   BMI Calculated 35 24   BSA Calculated 1 83     Physical Exam    Constitutional   General appearance: No acute distress, well appearing and well nourished  Neck   Neck: Normal, supple, trachea midline, no masses  Pulmonary   Respiratory effort: No increased work of breathing or signs of respiratory distress  Auscultation of lungs: Clear to auscultation  Cardiovascular   Auscultation of heart: Normal rate and rhythm, normal S1 and S2, no murmurs  Peripheral vascular exam: Normal pulses throughout  No edema noted in lower extremities  Genitourinary   External genitalia: Normal and no lesions appreciated  Vagina: Abnormal  -- Atrophic, fairly foreshortened, Loose agglutination, no tumor, no blood, no lesions  Urethra: Normal     Urethral meatus: Normal     Bladder: Normal, soft, non-tender and no prolapse or masses appreciated  Cervix: Surgically absent  Uterus: Surgically absent  Adnexa/parametria: Surgically absent  Abdomen   Abdomen: Normal, soft, non-tender, and no organomegaly noted  -- No recurrent hernias, incisions all well healed  Examination for hernias: No hernias appreciated  Psychiatric   Orientation to person, place, and time: Normal     Mood and affect: Normal     GOG performance status is: 0      Results/Data  Results Free Text Form [facility]:   Results   US   DiagnosisUterus with bilateral fallopian tubes and ovaries:Endometrioid type endometrial adenocarcinoma with focal squamous differentiation, FIGO grade 1  See Synoptic Tumor Template (below)  See Note  ? The final status of this case has been discussed by Dr Paty Valdez with Brii Emery PA-C, from   [facility]  Intradepartmental consultation is in agreement with the above diagnosis (ST; 1/22/16)  Based upon findings in this case, this is at least Stage IIIB - pT3b, pNX, FIGO grade 1Umbilical hernia sac:Benign partially mesothelial-lined fibroadipose tissue  Anterior vaginal fornix:Squamous lined (focal) fibromuscular tissue with involvement by adenocarcinoma,in keeping with endometrioid type endometrial adenocarcinoma (see part A)  signed by Roberto Flor on 1/22/2016 at 1200Informationtests were developed and their performance characteristics determined by [address] [place] [address] or [facility]  They may not be cleared or approved by the [place]  Food and Drug Administration  The FDA has determined that such clearance or approval is not necessary  These tests are used for clinical purposes  They should not be regarded as investigational or for research  This laboratory has been approved by [facility], designated as a high-complexity laboratory and is qualified to perform these tests    ChecklistHysterectomy, With or Without Other Organs or Tissues - All Specimens    Uterine corpus    (Note A)Type  Simple hysterectomyProcedures  Bilateral salpingo-oophorectomyProcedures  Other (specify): Part B: Hernia sac; Part C: Anterior vaginal fornix biopsyNode Sampling  Not performedIntegrity (Note A)  Intact hysterectomy specimenSite  Anterior endometriumSite  Posterior endometrium    Type (Note B)  Endometrioid adenocarcinoma, not otherwise characterizedGrade (Note C)  FIGO grade 1    Size  Greatest dimension (cm): 8 3Invasion (Note D)  Presentof Myometrial Invasion  Specify depth of invasion (mm): 12Thickness (mm)  14of Cervix (Note E)  Invasion of cervical stromal connective tissueOrgans Submitted  Right ovaryStatus of Right Ovary  Not involvedOrgans Submitted  Left ovaryStatus of Left Ovary  Not involvedOrgans Submitted  Right fallopian tubeStatus of Right Fallopian Tube  Not involvedOrgans Submitted  Left fallopian tubeStatus of Left Fallopian Tube  InvolvedOrgans Submitted  +VaginaStatus of Vagina  InvolvedOrgans Submitted  +Right parametriumStatus of Right Parametrium  Not involvedOrgans Submitted  +Left parametriumStatus of Left Parametrium  Not involvedAscitic Fluid (Note F)  Negative for malignancy / normal / benign(Note G)     (Note G)  Involved by invasive carcinomaMargin(s)  Specify margin(s): Cervical, posterior (and part C, vaginal biopsy is positive)FINDINGS     Invasion (Note H)  Not identifiedSTUDIES     Studies (Note K)  Not performed(pTNM [FIGO]) (Note I)     Descriptors  Not applicableTumor (pT)  BE3Z [IIIB]: Vaginal involvement (direct extension or metastasis) or parametrial involvementLymph Nodes (pN)  pNX: Cannot be assessedLymph Nodes  No pelvic nodes submitted or foundLymph Nodes  No para-aortic nodes submitted or foundMetastasis (pM)  Not applicableNON-TUMOR     Pathologic Findings (Note J)  Other (specify): Leiomyomata, including cellular variant, and with hyalinizing changes (up to 1 cm); ovarian cortical inclusion glands/cysts; paratubal (paramesonephric) cysts       DescriptionThe specimen is received in formalin, labeled with the patient's name and hospital number, and is designated  Uterus with bilateral ovaries and fallopian tubes  The specimen consists of a uterus with cervix with attached bilateral adnexal tissues  The uterus with cervix measures 9 4 x 5 4 x 3 9 cm and weighs 97 g  The serosa exhibits multiple areas of bulging, consistent with underlying myometrial nodules  An ovoid 1 cm os is present  The anterior surface is inked blue and the posterior surface is inked black  The parametrial margins are shaved  The uterus is bivalved revealing that the endometrial cavity, lower uterine segment and endocervix all appear to be involved with tan friable appearing lesional tissue measuring 8 3 x 4 7 cm in greatest dimension  Gross photographs are taken   The lesional tissue appears to extend to within approximately 1 cm of the anterior and posterior cervical margins (ectocervix is not identified grossly) and appears to extend to within 5 mm of multiple parametrial margins  Upon cut section the lesional tissue appears to invade to a depth of greater than 50% of the underlying myometrium but does not appear to extend to the serosal surface  Several (3-5) subserosal nodules measuring up to 1 cm each are identified and exhibit whorled tan cut surfaces  The left fallopian tube is tan to purple, not distally fimbriated, measuring 3 8 cm in length by 0 4 cm in average diameter  Upon cut section the tube appears unremarkable  The attached left ovary is tan, measuring 2 x 1 x 0 7 cm  The ovary is bisected revealing unremarkable tan yellow cut surfaces  The right fallopian tube is tan to purple, distally fimbriated, measuring 3 4 cm in length by 0 6 cm in average diameter  Upon cut section the tube exhibits several paratubal cysts but otherwise appears unremarkable  The attached right ovary is tan yellow, measuring 2 x 0 9 x 0 6 cm  The ovary is bisected revealing unremarkable tan yellow cut surfaces  Representative sections  Twenty-one cassettes  Left anterior parametrial margin, shavedRight anterior parametrial margin, shavedLeft posterior parametrial margin, shavedRight posterior parametrial margin, shavedAnterior cervixPosterior cervixAnterior lower uterine segmentPosterior lower uterine segmentAnterior endomyometrium, one full thickness section in each cassettePosterior endomyometrium, one full thickness section in each cassetteMyometrial nodules, 2 sectionsLeft fallopian tube, 3 piecesLeft ovary, bisected, entireRight fallopian tube, 3 piecesRight ovary, bisected, entireThe estimated total formalin fixation time based upon information provided by the submitting clinician and the standard processing schedule is 58 5 hours  The specimen is received in formalin, labeled with the patient's name and hospital number, and is designated  Umbilical hernia sac  The specimen consists of a tan purple yellow fibromembranous/fibrofatty tissue fragment measuring 5 8 x 4 3 x 3 3 cm  The fragment is sectioned revealing unremarkable tan yellow fatty cut surfaces  Representative sections  One cassette  2 pieces  The estimated total formalin fixation time based upon information provided by the submitting clinician and the standard processing schedule is 57 75 hours  The specimen is received in formalin, labeled with the patient's name and hospital number, and is designated  Anterior vaginal fornix  The specimen consists of 2 tan to pink to purple firm/fibrous tissue fragments measuring 1 3 and 1 6 cm  Each fragment is bisected revealing firm tan purple cut surfaces  Entirely submitted  One cassette  4 pieces  The estimated total formalin fixation time based upon information provided by the submitting clinician and the standard processing schedule is 58 5 hours         Future Appointments    Date/Time Provider Specialty Site   10/17/2017 01:40 PM RAJESH Fields   Endocrinology [facility] ENDOCRINOLOGY     Signatures   Electronically signed by : Ilsa Vasquez MD; Sep  5 2017 11:51AM EST                       (Author)

## 2017-10-30 ENCOUNTER — DOCTOR'S OFFICE (OUTPATIENT)
Dept: URBAN - METROPOLITAN AREA CLINIC 136 | Facility: CLINIC | Age: 72
Setting detail: OPHTHALMOLOGY
End: 2017-10-30
Payer: MEDICARE

## 2017-10-30 DIAGNOSIS — H04.123: ICD-10-CM

## 2017-10-30 DIAGNOSIS — E11.3513: ICD-10-CM

## 2017-10-30 DIAGNOSIS — H25.013: ICD-10-CM

## 2017-10-30 DIAGNOSIS — H43.811: ICD-10-CM

## 2017-10-30 DIAGNOSIS — Z79.4: ICD-10-CM

## 2017-10-30 PROCEDURE — 92014 COMPRE OPH EXAM EST PT 1/>: CPT | Performed by: OPHTHALMOLOGY

## 2017-10-30 PROCEDURE — 92134 CPTRZ OPH DX IMG PST SGM RTA: CPT | Performed by: OPHTHALMOLOGY

## 2017-10-30 ASSESSMENT — CORNEAL EDEMA CLINICAL DESCRIPTION
OS_CORNEALEDEMA: ABSENT
OD_CORNEALEDEMA: ABSENT

## 2017-10-30 ASSESSMENT — REFRACTION_CURRENTRX
OD_OVR_VA: 20/
OD_OVR_VA: 20/
OS_OVR_VA: 20/
OD_OVR_VA: 20/

## 2017-10-30 ASSESSMENT — REFRACTION_MANIFEST
OD_VA3: 20/
OS_VA2: 20/
OD_VA3: 20/
OS_VA3: 20/
OS_VA1: 20/
OS_VA2: 20/
OD_VA3: 20/
OD_VA2: 20/
OD_VA1: 20/
OD_VA2: 20/
OD_VA1: 20/
OU_VA: 20/
OU_VA: 20/
OD_VA2: 20/
OS_VA1: 20/
OS_VA3: 20/
OD_VA1: 20/
OS_VA2: 20/
OS_VA3: 20/
OU_VA: 20/
OS_VA1: 20/

## 2017-10-30 ASSESSMENT — TEAR BREAK UP TIME (TBUT)
OD_TBUT: 2+
OS_TBUT: 2+

## 2017-10-30 ASSESSMENT — CONFRONTATIONAL VISUAL FIELD TEST (CVF)
OD_FINDINGS: CONSTRICTION
OD_COMMENTS: KEPT LOOKING AT FINGERS
OS_FINDINGS: FULL

## 2017-10-30 ASSESSMENT — VISUAL ACUITY
OD_BCVA: 20/50
OS_BCVA: HM

## 2017-11-01 NOTE — PROGRESS NOTES
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, with long-term current use of insulin (250 52,V58 67) (E11 39,E11 65,Z79 4)   2  Hyperlipidemia (272 4) (E78 5)   3  Hypertension (401 9) (I10)    Plan  Hyperlipidemia    · (1) LIPID PANEL, FASTING; Status:Active; Requested for:30Nov2017;    Perform:[facility]; Due:30Nov2018; Ordered;For:Hyperlipidemia; Ordered By:Nilda Pavon; Hyperlipidemia, Hypertension    · (1) T4, FREE; Status:Active; Requested for:30Nov2017;    Perform:[facility]; Due:30Nov2018; Ordered;For:Hyperlipidemia, Hypertension; Ordered By:Nilda Pavon;   · (1) TSH; Status:Active; Requested for:30Nov2017;    Perform:[facility]; Due:30Nov2018; Ordered; Hypertension; Ordered By:Nilda Pavon; 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, with long-termcurrent use of insulin    · From  HumaLOG KwikPen 100 UNIT/ML Subcutaneous Solution Pen-lgvofgxc91 units before meals To HumaLOG KwikPen 100 UNIT/ML Subcutaneous SolutionPen-injector 18 units before meals   Rx By: Miguel Mike; Dispense: 30 Days ; #E#:1 X 3 ML Pen (5 Pens); Refill: 3;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, with long-term current use of insulin; DENIS = N; Record   · From  Lantus SoloStar 100 UNIT/ML Subcutaneous Solution Pen-injector useas directed  60 units at bedtime To Lantus SoloStar 100 UNIT/ML SubcutaneousSolution Pen-injector USE AS DIRECTED  50 units at bedtime   Rx By: Miguel Mike; Dispense: 30 Days ; #E#:1 X 3 ML Pen (5 Pens); Refill: 3;For: 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, with long-term current use of insulin; DENIS = N; Record   · (1) HEMOGLOBIN A1C; Status:Active; Requested for:30Nov2017;    Perform:[facility]; Due:30Nov2018; Ordered;type 2 diabetes mellitus with ophthalmic complication, with long-term current use of insulin; Ordered By:Nilda Pavon;   · (1) MICROALBUMIN CREATININE RATIO, RANDOM URINE; Status:Active; Requestedfor:30Nov2017;    Perform:[facility]; Due:30Nov2018; Ordered;type 2 diabetes mellitus with ophthalmic complication, with long-term current use of insulin; Ordered By:Hussain Pavon; Unlinked    · GlipiZIDE 5 MG Oral Tablet   Dispense: 0 Days ; #E#: Sufficient Tablet; Refill: 0; DENIS = N; Record; Last Updated By: Indira Story; 10/17/2017 1:53:45 PM    Discussion/Summary  1  Uncontrolled type 2 diabetes with of the on the complication on long-term insulin therapy - sugars consistently high throughout the day, for now decrease Lantus to 50 units at bedtime, discontinue glipizide, increase Humalog to 18 units before meals  Monitor blood sugars 3 to 4 times a day and sent over fingerstick log in 2 weeks, repeat lab for hemoglobin A1c and of November 2  Hyperlipidemia- continue statins, will check fasting lipid profile 3  Hypertension- blood pressure at goal continue current meds Counseling Documentation With Imm: The patient was counseled regarding diagnostic results,-- instructions for management,-- risk factor reductions,-- impressions,-- risks and benefits of treatment options,-- importance of compliance with treatment  Patient's Capacity to Self-Care: Patient is unable to Self-Care:   Medication SE Review and Pt Understands Tx: Possible side effects of new medications were reviewed with the patient/guardian today  The treatment plan was reviewed with the patient/guardian  The patient/guardian understands and agrees with the treatment plan      Chief Complaint  Chief Complaint Free Text Note Form: Follow up  History of Present Illness  Diabetes: The patient is being seen for routine follow-up of Diabetes Mellitus 2  Current treatment includes Lantus,-- Humalog-- and-- Sulfonylurea  See Medication List for current medication(s)   Source of information reported by review of the patient's logbook and indicates that the patient checks her blood sugar 2-3 time(s) per day  Fasting blood sugars: 120-200s  Pre-prandial blood sugars: 150-250  Current pertinent lifestyle factors include obesity-- and-- a sedentary lifestyle  Symptoms reported by the patient include polydipsia,-- polyuria,-- blurred vision-- and-- edema, but-- no extremity numbness,-- no extremity paresthesias,-- no lower extremity ulcers,-- no recent weight gain,-- no nausea-- and-- no recent weight loss  Disease Course and Complications: The last dilated fundus exam showed retinopathy  Family History: diabetes mellitus type 2  Hyperlipidemia (Follow-Up): The patient states her hyperlipidemia has been under good control since the last visit  Comorbid Illnesses: coronary artery disease,-- diabetes mellitus-- and-- hypertension  Symptoms: denies chest pain,-- denies intermittent leg claudication,-- denies muscle pain-- and-- stable muscle weakness  Associated symptoms include no focal neurologic deficits-- and-- no memory loss  Medications: Medication(s): a statin  Hypertension (Follow-Up): The patient presents for follow-up of essential hypertension  Comorbid Illnesses: cardiac failure,-- coronary artery disease-- and-- retinopathy  Symptoms: stable impaired vision,-- denies dyspnea,-- denies chest pain-- and-- stable lower extremity edema  Associated symptoms include no headache,-- no focal neurologic deficits-- and-- no memory loss  Medications: the patient is adherent with her medication regimen  -- She denies medication side effects  Medication(s): a beta blocking agent-- and-- an angiotensin receptor blocker  Review of Systems  ROS Reviewed:   ROS reviewed  Endo Adult ROS Female Established v2 Update - [facility]:  Constitutional/General: no recent weight gain,-- no recent weight loss,-- no poor energy/fatigue,-- no increased energy level,-- insomnia/sleep problems,-- no fever-- and-- feeling weak  Breasts: no nipple discharge    Heart: high blood pressure-- and-- rapid/racing heart rate, but-- no chest pain/tightness-- and-- no palpitations  Genitourinary - Urinary: frequent urination-- and-- urinating during the night, but-- no excess urination  Eyes: no blurred vision,-- no double vision,-- no bulging eyes,-- no gritty/scratchy eyes-- and-- no excessive tearing  Mouth / Throat: no hoarseness-- and-- no difficulty swallowing  Neck: no lumps,-- no swollen glands,-- no neck pain,-- no neck stiffness-- and-- no enlarged thyroid  Respiratory: no wheezing,-- no asthma-- and-- no persistent cough  Musculoskeletal: muscle aches/pain,-- joint aches/pain-- and-- muscle weakness  Skin & Hair: dry skin,-- no acne,-- the hair texture was oily,-- hair loss-- and-- no excessive hair growth  Gastrointestinal: constipation,-- diarrhea,-- no waking at night to drink-- and-- stomach ache  Neurological: no blackouts,-- no weakness-- and-- no tremors  Reproductive:  frequency of period is not applicable  -- duration of period is not applicable  -- Date of last menstruation is not applicable  -- regular periods are not applicable  -- discomfort with periods is not applicable  -- excessive bleeding during period is not applicable  -- mood swings are not applicable  Endocrine: no feeling hot frequently,-- feeling cold frequently,-- no shifts between feeling hot and cold,-- cold hands or feet,-- no excessive sweating,-- no thyroid problems,-- blood sugar problems,-- no excessive thirst,-- excessive hunger,-- change in shoe size,-- nausea or vomiting-- and-- shaky hands  Active Problems    1  Acute congestive heart failure (428 0) (I50 9)   2  Alopecia (704 00) (L65 9)   3  Anxiety (300 00) (F41 9)   4  Bladder incontinence (788 30) (R32)   5  CAD (coronary artery disease) (414 00) (I25 10)   6  Chronic diastolic CHF (congestive heart failure) (428 32,428 0) (I50 32)   7  Claudication (443 9) (I73 9)   8  Constipation (564 00) (K59 00)   9  Depression (311) (F32 9)   10   Diabetes mellitus (250 00) (E11 9)   11  Endometrial cancer (182 0) (C54 1)   12  Foot pain (729 5) (M79 673)   13  Former smoker (Z87 891)   15  Gait disturbance (781 2) (R26 9)   15  Hyperlipidemia (272 4) (E78 5)   16  Hypertension (401 9) (I10)   17  Meralgia paresthetica of right side (355 1) (G57 11)   18  Nausea (787 02) (R11 0)   19  NSTEMI, initial episode of care (410 71) (I21 4)   20  Osteoporosis (733 00) (M81 0)   21  Peripheral neuropathy (356 9) (G62 9)   22  Primary localized osteoarthritis of right knee (715 16) (M17 11)   23  Psoriasis (696 1) (L40 9)   24  Quadriceps weakness (728 87) (M62 81)   25  Spinal stenosis (724 00) (M48 00)   26  TIA (transient ischemic attack) (435 9) (G45 9)   27  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, with long-term  current use of insulin (250 52,V58 67) (E11 39,E11 65,Z79  4)    Past Medical History  1  History of Acute knee pain (719 46) (M25 569)   2  History of Acute leg pain (729 5) (M79 606)   3  History of Acute urinary tract infection (599 0) (N39 0)   4  History of Bilateral numbness and tingling of arms and legs (782 0) (R20 0,R20 2)   5  History of Foot pain (729 5) (M79 673)   6  History of Gait disturbance (781 2) (R26 9)   7  History of Gout (274 9) (M10 9)   8  History of  3   9  History of abdominal pain (V13 89) (Z87 898)   10  History of acute sinusitis (V12 69) (Z87 09)   11  History of allergic rhinitis (V12 69) (Z87 09)   12  History of dizziness (V13 89) (Z87 898)   13  History of headache (V13 89) (Z87 898)   14  History of hyperlipidemia (V12 29) (Z86 39)   15  History of nausea (V12 79) (Z87 898)   16  History of psoriatic arthritis (V13 4) (Z87 2)   17  History of sciatica (V12 49) (Z86 69)   18  History of stroke (V12 54) (Z86 73)   19  History of urinary frequency (V13 09) (Z87 898)   20  History of weakness (V13 89) (Z87 898)   21  History of Knee pain (719 46) (M25 259)   22   History of Lumbar radiculopathy (724 4) (M54 16)   23  History of Pain of lower extremity (729 5) (M79 606)   24  Personal history of arthritis (V13 4) (Z87 39)   25  History of Postmenopausal bleeding (627 1) (N95 0)   26  History of Screening for osteoporosis (V82 81) (Z13 820)   27  History of Urinary tract infection (599 0) (N39 0)   28  History of Vertigo following CVA (cerebrovascular accident) (727 03) (X55 822,V23)  Active Problems And Past Medical History Reviewed: The active problems and past medical history were reviewed and updated today  Surgical History    1  History of Cardiac Cath Lesion 1, 1st Adjunct Treat Device: Stent   2  History of Cholecystectomy Laparoscopic   3  History of Laparoscopy With Total Hysterectomy   4  History of Pelvic Examination Under Anesthesia   5  History of Salpingo-oophorectomy Bilateral   6  History of Tubal Ligation   7  History of Umbilical Hernia Repair   8  History of Uterine Myomectomy  Surgical History Reviewed: The surgical history was reviewed and updated today  Family History  Mother    1  Family history of cardiac disorder (V17 49) (Z82 49)   2  Family history of hypertension (V17 49) (Z82 49)  Father    3  Family history of diabetes mellitus (V18 0) (Z83 3)   4  Family history of hypertension (V17 49) (Z82 49)   5  Family history of malignant neoplasm (V16 9) (Z80 9)   6  Family history of malignant neoplasm of prostate (V16 42) (Z80 45)  Family History    7  Family history of arthritis (V17 7) (Z82 61)   8  Family history of osteoporosis (V17 81) (Z82 62)  Family History Reviewed: The family history was reviewed and updated today  Social History     · Former smoker (E70 775)   · No drug use   · Social alcohol use (Z78 9)  Social History Reviewed: The social history was reviewed and updated today  Current Meds   1  Acetaminophen 500 MG Oral Tablet; Therapy: (Recorded:17Oct2017) to Recorded   2  Allopurinol 300 MG Oral Tablet; TAKE 1 TABLET DAILY;  Therapy: 00FDH1969 to (Evaluate:22Jan2018)  Requested for: 80Kve4735; Last Rx:33Ank7127 Ordered   3  Aspirin 81 MG Oral Tablet Delayed Release; take 1 tablet by mouth daily; Therapy: 67ZMX9146 to (Evaluate:17Sep2017); Last Rx:19Jun2017 Ordered   4  Atorvastatin Calcium 80 MG Oral Tablet; TAKE 1 TABLET AT BEDTIME  Requested for: 93Vju0446; Last Rx:33Jtk2873 Ordered   5  BD Pen Needle Jodi U/F 32G X 4 MM Miscellaneous; USE 4 TIMES A DAY; Therapy: 85KIH7846 to (Sara Hedrick)  Requested for: 56JYN6776; Last Rx:74Hzo3334 Ordered   6  BusPIRone HCl - 7 5 MG Oral Tablet; Therapy: (Recorded:17Oct2017) to Recorded   7  Clopidogrel Bisulfate 75 MG Oral Tablet; TAKE 1 TABLET DAILY  Requested for: 48Tse5876; Last Rx:99Xdz7666 Ordered   8  DULoxetine HCl - 60 MG Oral Capsule Delayed Release Particles; take 1 capsule daily; Therapy: 51PJY9317 to (Evaluate:22Jan2018)  Requested for: 41Yoy3453; Last Rx:38Vcp3058 Ordered   9  Furosemide 40 MG Oral Tablet; TAKE 1 TABLET DAILY  Requested for: 97Rcn7097; Last Rx:37Iub7102 Ordered   10  Gabapentin 300 MG Oral Capsule; TAKE 1 CAPSULE TWICE DAILY; Therapy: 64ECQ6566 to (Evaluate:22Jan2018)  Requested for: 39Dfk6533; Last  Rx:25Qul7401 Ordered   11  GlipiZIDE 5 MG Oral Tablet; Therapy: (Recorded:17Oct2017) to Recorded   12  HumaLOG KwikPen 100 UNIT/ML Subcutaneous Solution Pen-injector; 12 units before  meals; Therapy: 25RAD0939 to (Evaluate:14Feb2018)  Requested for: 66COC3918 Recorded   13  Lantus SoloStar 100 UNIT/ML Subcutaneous Solution Pen-injector; use as directed  60  units at bedtime; Therapy: 14Apr2017 to (Evaluate:14Feb2018)  Requested for: 73UMQ2624 Recorded   14  LORazepam 0 5 MG Oral Tablet; 1 PO BID PRN; Therapy: 98WBK3794 to (Sara Hedrick)  Requested for: 14MLM9980; Last  Rx:14Oyo5204 Ordered   15  Losartan Potassium 100 MG Oral Tablet; Take 1 tablet daily; Therapy: 59LRV9117 to (Evaluate:22Jan2018)  Requested for: 48Htn3903; Last  Rx:38Amn2254 Ordered   16   Metoprolol Succinate ER 50 MG Oral Tablet Extended Release 24 Hour; TAKE 1 TABLET  DAILY  Requested for: 95Ulo1452; Last Rx:61Eyz0708 Ordered   17  Multi Vitamin/Minerals TABS; TAKE 1 TABLET DAILY; Therapy: (Marissa Mercado) to Recorded   18  Potassium Chloride ER 20 MEQ Oral Tablet Extended Release; One daily  Requested  for: 00Nak1837; Last Rx:89Iee3672 Ordered   19  Syringe 30 ML Miscellaneous; USE AS DIRECTED; Therapy: 30MTF3000 to (Last Rx:05Jun2017)  Requested for: 63POF2161 Ordered  Medication List Reviewed: The medication list was reviewed and updated today  Allergies  1  Sulfa Drugs    Vitals  Vital Signs    Recorded: 42DKF8229 01:28PM   Heart Rate 76   Systolic 679   Diastolic 70   Height 5 ft 1 in   Weight 188 lb    BMI Calculated 35 52   BSA Calculated 1 84       Physical Exam   Constitutional  General appearance: No acute distress, well appearing and well nourished  Eyes  Conjunctiva and lids: No swelling, erythema, or discharge  Pupils: Equal, round and reactive to light  The sclera are anicteric  Extraocular movements are intact  Ears, Nose, Mouth, and Throat  External inspection of ears, nose and lips: Normal    Exam of Head: The head is atraumatic and normocephalic  Neck: The neck is supple  The thyroid is normal in size with no palpable nodules  Pulmonary  Auscultation of lungs: Clear to auscultation bilaterally with normal chest expansion  Cardiovascular  Auscultation of heart: Normal rate and rhythm with no murmurs, gallops or rubs  Examination of extremities for edema and/or varicosities: Abnormal  -- + ankle edema b/l  Examination of pulses: Dorsalis pedal pulses are +2 and equal bilaterally  Examination of carotids: No bruit   Abdomen  Abdomen: Abdomen is soft, non-tender with normal bowel sounds  Lymphatic  Palpation of lymph nodes: No supraclavicular or suboccipital lymphadenopathy     Musculoskeletal  Inspection/palpation of joints, bones, and muscles: Muscle bulk and tone is normal    Skin  Skin and subcutaneous tissue: Normal skin temperature and color  Neurologic  Motor Strength: Strength is 5/5 bilaterally  Psychiatric  Orientation to person, place and time: Normal    Mood and affect: Affect and attention span are normal        Results/Data  Diagnostic Studies Reviewed:  AUGUST 31, [id number] METABOLIC PANEL SHOWS A GLUCOSE , ALT 62, ALK-PHOS 142 OTHERWISE WITHIN NORMAL RANGE, HEMOGLOBIN A1C 11 3, CBC ESSENTIALLY WITHIN NORMAL RANGE      Health Management  Diabetes mellitus   *VB - Eye Exam; every 1 year; Last 59FWJ2444; Next Due: 21RHQ8577; Near Due  *VB - Foot Exam; every 1 year; Last 35KGI6684; Next Due: 11HRC6394; Overdue  Health Maintenance   Medicare Annual Wellness Visit; every 1 year; Next Due: 78Emx8935;  Overdue    Future Appointments    Date/Time Provider Specialty Site   01/17/2018 01:45 PM Macie Washington, [address]  Endocrinology [facility] ENDOCRINOLOGY   03/06/2018 10:00 AM Aguila Jacobson MD Gynecological Oncology [facility] [place]       Signatures   Electronically signed by : RAJESH Zepeda ; Oct 17 2017  2:42PM EST                       (Author)

## 2017-11-30 DIAGNOSIS — I10 ESSENTIAL (PRIMARY) HYPERTENSION: ICD-10-CM

## 2017-11-30 DIAGNOSIS — E11.39 TYPE 2 DIABETES MELLITUS WITH OTHER DIABETIC OPHTHALMIC COMPLICATION (CODE): ICD-10-CM

## 2017-11-30 DIAGNOSIS — E11.65 TYPE 2 DIABETES MELLITUS WITH HYPERGLYCEMIA (HCC): ICD-10-CM

## 2017-11-30 DIAGNOSIS — E78.5 HYPERLIPIDEMIA: ICD-10-CM

## 2018-01-10 NOTE — PROGRESS NOTES
Plan    1  DSMT/MNT Time Record; Status:Complete;   Done: 64MYH3896 03:09PM    Discussion/Summary    PATIENT EDUCATION RECORD   Healthy Eating:   Discussed nutrient types ( Cho/Fat/Protein): Method: Instruction  Response: Verbalizes Understanding   Discussed sodium intake: Method: Instruction and Handout  Response: Verbalizes Understanding and Needs ReviewResponse:   3 day food diary returned  initial Response: Her CHO's per meal are 30-45  Her protein needs are 57 g/day  Discussed basic carbohydrate counting: Method: Instruction and Handout  Response: Verbalizes Understanding and Needs Review   Discussed Non-starchy vegetables  Method: Instruction and Handout  Response: Verbalizes Understanding She was given the following educational materials: Pick a Food , Non-starchy vegetables list        Chief Complaint  Za Fan was seen for MNT f/u for type 2 diabetes         History of Present Illness  Za Fna was seen for follow-up today  She has some memory of seeing me here before and some of the education provided at that time  her recall improved when I showed her copies of the materials provided last time  We talked about reasons for BG elevations and the likely need for increase in insulin doses which the physician or CRNP will order  Explained that just a few corn chips will not keep BG elevated for days  She continues to get Meals on Wheels delivery with a hot lunch and bagged supper meal  Reviewed carbohydrate sources and importance of consuming only 2-3 servings per meal meaning that some of her items from delivered meals will need to be saved for breakfast or the weekend  She does do some shopping with her son, explained that "Low Sodium" is the descriptor she needs on food items purchased, not "Reduced sodium"  Provided a "Pick a Food"  and wrote in some of her favorite choices   Explained how to use the paper for meal ideas for every meal  Also gave her a list of non-starchy vegetables and encouraged inclusion of these at any time  She will be seen for endo f/u next week and continue with education PRN  This is her follow-up assessment   Present at session: patient    Data:   [facility] records provided without dates or times  All 197-301 mg/dL Medical Diagnosis/Reason for Referral:E11 39, E11 65  She has special learning needs: Seems to have variable retention of education  Her caloric needs are 1250  She has had no recent weight change  Patient  and son  cooks the food  She receives Meals on Wheels  Exercise routine:    Tries to do some exercises provided by PT  She eats breakfast at  AM 2 eggs & 1 piece of whole wheat or pumpernickel toast or Cheeerios w/ 1c  milk and few slices of banana    She eats lunch at  PM Hot meal from [facility]: portion of chicken or other meat, ~1/2c  potatoes or pasta or rice, cooked vegetables, 1/2 pint milk, fruit cup   She snacks at 6 triscuits or wheat thins   She eats dinner at  PM [facility] sandwich, potato or macaroni salad OR roasted vegetables w/ cheese OR salad w/ lunch meat   She snacks at at bedtime Crackers or fruit cup or small apple     NUTRITION DIAGNOSES   Self Monitoring Deficit   Self-Monitoring Deficit Related To: Lack of focus and attention to detail, difficulty with time management, and/or organization  As evidenced by: Incomplete self-monitoring records e g  glucose, food, fluid intake weight physical activity  Medical Nutrition Therapy Intervention: Plate Method, Carbohydrate counting, Meal planning, Strategies to increase the intake of plant based foods, Strategies to monitor portion control and Meal timing  Her comprehension was fair   Her motivation was good   Her compliance was fair   Goals:  1  2-3 carb choices per meal  2  Use blood sugar log to record BG readings  3   Do seated exercises as able      Vitals  Signs   Recorded: 87ZQI4842 03:00PM   Weight: 183 lb 3 2 oz  BMI Calculated: 34 62  BSA Calculated: 1 82    Future Appointments    Date/Time Provider Specialty Site   08/18/2017 01:15 PM Nba Hinton DO Family Medicine [facility]   07/06/2017 03:15 PM Rickey Araujo [facility] Endocrinology [facility] ENDOCRINOLOGY   08/03/2017 01:15 PM Josi Mckenzie MD Gynecological Oncology [facility]   Electronically signed by : Remington Yeager RD; Jun 30 2017  3:24PM EST                       (Author)    Electronically signed by : RAJESH Doty ; Jul 5 2017  8:38AM EST

## 2018-01-11 NOTE — MISCELLANEOUS
History of Present Illness    The patient is being contacted for follow-up after hospitalization  Hospitalization The hospitalization was not a readmission, The patient was discharged on 1/20/17  She has a high risk for readmission  She was discharged to a SNF for rehabilitation  The patient's status is short term  The facility name is: [facility]  Phone number: [phone]  I spoke with Ryder Beasley, nurse  The patient is on HF pathway  Dr Oz Castro  The patient's discharge weight is 169 5 and stable The patient's weight today is ? Edis Kaska Patient is experiencing the following symptoms:  The patient is currently asymptomatic  [facility] Issues include: none  Treatment Questions: Diet ordered is Low salt, staff is aware of symptoms to report, discharge medications were reconciled with the facility provider/PCP, the patient/family is compliant with diet, daily weights are being done and a plan is in place for who to call for worsening symptoms  The patient has the following appointments:    to see Dr Emma Tolliver tomorrow in [place] office- reminder given  Staff re-education topics include symptoms to report, patient and family education and importance of compliance with treatment  Current Meds   1  Allopurinol 300 MG Oral Tablet; TAKE 1 TABLET DAILY; Therapy: 26UEZ4777 to (Evaluate:13May2017)  Requested for: 50HHN5269; Last   Rx:14Nov2016 Ordered   2  Aspirin 325 MG Oral Tablet; TAKE 1 TABLET DAILY; Therapy: 99DDS2561 to Recorded   3  BD Insulin Syringe 30G X 1/2" 0 5 ML Miscellaneous; USE AS DIRECTED; Therapy: 79FVZ9529 to (Last Rx:06Upc2219)  Requested for: 48XHC5308 Ordered   4  DULoxetine HCl - 30 MG Oral Capsule Delayed Release Particles; take 1 capsule by   mouth once daily; Therapy: 38QMF3979 to (Evaluate:10Jan2017)  Requested for: 06IZO2719; Last   Rx:11Nov2016 Ordered   5  HumaLOG 100 UNIT/ML Subcutaneous Solution; INJECT 8 UNIT Before meals;    Therapy: 30YWQ7961 to (Evaluate:18Jan2017)  Requested for: 52HMC4133; Last   Rx:08Dri1963 Ordered   6  Lasix 40 MG Oral Tablet (Furosemide); TAKE 1 TABLET DAILY; Therapy: (MAMDIEAV:89YNL9497) to Recorded   7  Levemir FlexTouch 100 UNIT/ML Subcutaneous Solution Pen-injector; INJECT 45 UNIT   Twice daily; Therapy: 53WZX7360 to (Last Rx:49Ngu1352)  Requested for: 07Jpf0834 Ordered   8  Lipitor 80 MG Oral Tablet (Atorvastatin Calcium); TAKE 1 TABLET AT BEDTIME; Therapy: (VSYKKUAQ:49EFT6103) to Recorded   9  LORazepam 0 5 MG Oral Tablet; 1 PO BID PRN; Therapy: 92TOL1088 to (Evaluate:08Apr2017)  Requested for: 28IYJ3380; Last   Rx:48Eov1867 Ordered   10  Losartan Potassium 100 MG Oral Tablet; Take 1 tablet daily; Therapy: 76FGS9016 to (Evaluate:10Oct2016)  Requested for: 86Ska2370; Last    Rx:61Lak0535 Ordered   11  Metoprolol Succinate ER 50 MG Oral Tablet Extended Release 24 Hour; TAKE 1 TABLET    DAILY; Therapy: (EVLOIROT:99CQN6360) to Recorded   12  Multi Vitamin/Minerals TABS; TAKE 1 TABLET DAILY; Therapy: (Cam Aguilera) to Recorded   13  Plavix 75 MG Oral Tablet (Clopidogrel Bisulfate); TAKE 1 TABLET DAILY; Therapy: (BZMKNGJD:45CRB1503) to Recorded   14  Potassium Chloride ER 20 MEQ Oral Tablet Extended Release; One daily; Therapy: (GRPWZLJE:67ULW9185) to Recorded   15  TraMADol HCl - 50 MG Oral Tablet; TAKE 1 TABLET 3 times daily; Therapy: 15JMW5565 to (Evaluate:14Nov2016); Last Rx:36Tis7196 Ordered    Allergies    1  Sulfa Drugs    Future Appointments    Date/Time Provider Specialty Site   01/26/2017 10:40 AM RAJESH Bacon  Cardiology [facility] CARDIOLOGY [place]   01/30/2017 01:00 PM RAJESH Rao   Endocrinology [facility] ENDOCRINOLOGY   02/16/2017 03:30 PM Mary Hinton DO Family [facility]   02/28/2017 01:00 PM David Trotter MD Gynecological Oncology [facility] Keesha Cuevas     Signatures   Electronically signed by : Susi Stubbs, ; Jan 25 2017  2:42PM EST (Author)

## 2018-01-11 NOTE — MISCELLANEOUS
Message   Recorded as Task   Date: 07/03/2017 02:28 PM, Created By: Huma Camarena   Task Name: Miscellaneous   Assigned To: Nate Hinton   Regarding Patient: Lucina Farrell, Status: Active   Comment: Huma Camarena - 23 Jul 2017 2:28 PM     TASK CREATED  Caller: Marine Tate, Lluvia; Other  PT IS HAVING CATARACTS SURGERY 08/02/17 [address]  CALLED [phone]  PT DOES NOT NEED ANY TESTING BEFORE ARASELI WILL FAX PW ANY ? ? CALL [address] [phone] PT HAS PRE OP APPT 07/26/17        Signatures   Electronically signed by : Basim Moulton DO; Jul  3 2017  2:29PM EST                       (Author)

## 2018-01-11 NOTE — MISCELLANEOUS
Message   Recorded as Task   Date: 07/05/2016 07:50 AM, Created By: Shameka Nj   Task Name: Medical Complaint Callback   Assigned To: Nate Hinton   Regarding Patient: Vaibhav Jara, Status: Active   Comment:    Alexandrea Wiggins - 05 Jul 2016 7:50 AM     TASK CREATED  Caller: Self; Medical Complaint; [phone] (Home); [phone] (Work)  2 MESSAGES FROM ANSWERING SERVICE ON 7/2/16:  PT FELL OUT OF CHAIR IS FINE BUT LEGS WEAKER  PT FELL 3 TIMES HAS HAD TO CALL AMBULANCE FOR ASSISTANCE, SHE IS HAVING DIFFICULTY GETTING AROUND HER APARTMENT DUE TO HER KNEES  SHE WOULD LIKE TO BE SENT TO [facility] [place] OR ANY OTHER SUGGESTION  THIS WOULD BE UNTIL SHE GETS HER LEGS STRONGER  WOULD LIKE CALL BACK WHEN OFFICE OPEN  Nate Hinton - 05 Jul 2016 9:56 AM     TASK EDITED  i called and left a message  she will need hospital eval as she is medicare and they require a 3 day stay befoer she can be placed even temporarily  Leydi Pillai - 05 Jul 2016 5:24 PM     TASK REASSIGNED: Previously Assigned To [address] [place] [address]  Did you eventually talk to her Tuesday ? If she is a fall risk, perhaps we should attempt to get physical therapy out to her home or perhaps a  to talk about nursing home placement options  Nate Sue - 07 Jul 2016 9:59 AM     TASK EDITED  i called her- she is set up for physical therapy downstairs in  a few days  and is ok with this at this time  she is declining any other new services at this time    will send over script for tramadol for her for pain        Plan  Acute leg pain    · TraMADol HCl - 50 MG Oral Tablet; TAKE 1 TABLET 3 times daily    Signatures   Electronically signed by : Rachel Barker DO; Jul 7 2016  9:59AM EST                       (Author)

## 2018-01-12 NOTE — MISCELLANEOUS
Message   Recorded as Task   Date: 05/05/2017 01:56 PM, Created By: Nate Hinton   Task Name: Call Back   Assigned To: GregoryApril   Regarding Patient: Gurjit Calhoun, Status: Active   Comment:    Nate Hinton - 05 May 2017 1:56 PM     TASK CREATED  please call nain -- her sugars are high  she needs to follow up with endocrinology  rest of labs are ok   GregoryApril - 05 May 2017 3:47 PM     TASK EDITED  left detailed message for the patient on home number  Active Problems    1  Acute congestive heart failure (428 0) (I50 9)   2  Alopecia (704 00) (L65 9)   3  Anxiety (300 00) (F41 9)   4  Bladder incontinence (788 30) (R32)   5  CAD (coronary artery disease) (414 00) (I25 10)   6  Chronic diastolic CHF (congestive heart failure) (428 32,428 0) (I50 32)   7  Claudication (443 9) (I73 9)   8  Constipation (564 00) (K59 00)   9  Depression (311) (F32 9)   10  Diabetes mellitus (250 00) (E11 9)   11  Encounter for screening mammogram for breast cancer (V76 12) (Z12 31)   12  Endometrial cancer (182 0) (C54 1)   13  Foot pain (729 5) (M79 673)   14  Former smoker (Z87 891)   13  Gait disturbance (781 2) (R26 9)   16  Hyperlipidemia (272 4) (E78 5)   17  Hypertension (401 9) (I10)   18  Meralgia paresthetica of right side (355 1) (G57 11)   19  Nausea (787 02) (R11 0)   20  NSTEMI, initial episode of care (410 71) (I21 4)   21  Osteoporosis (733 00) (M81 0)   22  Peripheral neuropathy (356 9) (G62 9)   23  Primary localized osteoarthritis of right knee (715 16) (M17 11)   24  Psoriasis (696 1) (L40 9)   25  Quadriceps weakness (728 87) (M62 81)   26  Screening for osteoporosis (V82 81) (Z13 820)   27  Spinal stenosis (724 00) (M48 00)   28  TIA (transient ischemic attack) (435 9) (G45 9)    Current Meds   1  Allopurinol 300 MG Oral Tablet; TAKE 1 TABLET DAILY; Therapy: 33QUU8829 to (Evaluate:31Xbh1887)  Requested for: 28CDS0365; Last   Rx:02Mar2017 Ordered   2   Aspirin 325 MG Oral Tablet; TAKE 1 TABLET DAILY; Therapy: 09PKH2211 to (Evaluate:62Nwo8176)  Requested for: 99Jcp5063; Last   Rx:05Apr2017 Ordered   3  Atorvastatin Calcium 80 MG Oral Tablet (Lipitor); TAKE 1 TABLET AT BEDTIME    Requested for: 67HTB1132; Last Rx:02Mar2017 Ordered   4  Basaglar KwikPen 100 UNIT/ML Subcutaneous Solution Pen-injector; 90 units daily; Therapy: (Recorded:07Feb2017) to Recorded   5  BD Insulin Syringe 30G X 1/2" 0 5 ML Miscellaneous; USE AS DIRECTED; Therapy: 27VTO0648 to (Last Rx:26Ydc9242)  Requested for: 41IGV4268 Ordered   6  Clopidogrel Bisulfate 75 MG Oral Tablet (Plavix); TAKE 1 TABLET DAILY  Requested for:   59OUB1016; Last Rx:02Mar2017 Ordered   7  DULoxetine HCl - 60 MG Oral Capsule Delayed Release Particles; take 1 capsule daily; Therapy: 62SQL5211 to (Evaluate:29Aug2017)  Requested for: 81XRM9043; Last   Rx:02Mar2017 Ordered   8  Furosemide 40 MG Oral Tablet (Lasix); TAKE 1 TABLET DAILY  Requested for:   14SAJ0426; Last Rx:02Mar2017 Ordered   9  Gabapentin 300 MG Oral Capsule; TAKE 1 CAPSULE TWICE DAILY; Therapy: 30RHQ7779 to (Evaluate:09Jun2017)  Requested for: 37KLC5679; Last   Rx:09Feb2017 Ordered   10  GlipiZIDE 5 MG Oral Tablet; TAKE 1 TABLET DAILY  Requested for: 61WEG7744; Last    Rx:02Mar2017 Ordered   11  HumaLOG 100 UNIT/ML Subcutaneous Solution; INJECT 8 UNIT Before meals; Therapy: 04DYQ3921 to (Evaluate:24Jun2017)  Requested for: 65UOX9074; Last    Rx:24Feb2017 Ordered   12  Lantus SoloStar 100 UNIT/ML Subcutaneous Solution Pen-injector; USE AS DIRECTED; Therapy: 14Apr2017 to (Last Rx:14Apr2017)  Requested for: 91Cdh3407 Ordered   13  LORazepam 0 5 MG Oral Tablet; 1 PO BID PRN; Therapy: 65PDT3519 to (Evaluate:08Apr2017)  Requested for: 28SZO2431; Last    Rx:37Lmv7944 Ordered   14  Losartan Potassium 100 MG Oral Tablet; Take 1 tablet daily; Therapy: 68OXE7767 to (Evaluate:69Sru3927)  Requested for: 05VPN4920; Last    Rx:02Mar2017 Ordered   15   Metoprolol Succinate ER 50 MG Oral Tablet Extended Release 24 Hour; TAKE 1 TABLET    DAILY  Requested for: 88EWW2596; Last Rx:02Mar2017 Ordered   16  Multi Vitamin/Minerals TABS; TAKE 1 TABLET DAILY; Therapy: (Maynor Snyder) to Recorded   17  Potassium Chloride ER 20 MEQ Oral Tablet Extended Release; One daily  Requested    for: 35HMO9486; Last Rx:02Mar2017 Ordered    Allergies    1   Sulfa Drugs    Signatures   Electronically signed by : Ilir Gamez, ; May  5 2017  3:47PM EST                       (Author)

## 2018-01-12 NOTE — PROGRESS NOTES
Plan    1  DSMT/MNT Time Record; Status:Complete;   Done: 14CKA1321 04:04PM    Discussion/Summary    PATIENT EDUCATION RECORD   Indication for Services: hypertension, type 2 Diabetes Mellitus, hyperlipidemia and obesity  She is ready to learn  Healthy Eating:   Discussed importance of meal timing/consistency: Method: Instruction and Handout  Response: Verbalizes Understanding and Needs Review   Discussed nutrient types ( Cho/Fat/Protein): Method: Instruction and Handout  Response: Verbalizes Understanding and Needs Review   Discussed portion sizes: Method: Instruction and Handout  Response: Verbalizes Understanding and Needs ReviewResponse: Her current weight is 178  Her CHO's per meal are 30-45  Her protein needs are 45-56 g/day  He/She was provided a meal plan for: fixed carbohydrates  Discussed basic carbohydrate counting: Method: Instruction and Handout  Response: Verbalizes Understanding and Needs Review   Taking Medication:   Discussed devices-syringe/pen  Method: Instruction and Demonstration  Response: Verbalizes Instruction and Needs Review   She uses Has Lantus and Humalog with her  Discussed site selection and rotation of injections  Method: Instruction  Response: [employer]  Discussed medication storage  Method: Instruction  Response: [employer]  Discussed onset, peak, and duration of insulin  Method: Instruction  Response: [employer]  She was given the following educational materials: Plate Method meal planner   Chief Complaint  Abbe was seen for MNT for type 2 diabetes      History of Present Illness  Abbe brought in her insulin pens and vial  She had an empty Levemir pen which we discarded, a box with 3 Lantus SoloSTAR pens, and a 3/4 empty bottle of Humalog  Stated she had not received delivery of Novolog insulin yet  Advised her to call pharmacy in the morning to see if there is an Rx on file for same   Abbe says she has 2 bottles of Humalog going  Explained that after 28 days, the insulin loses its effectiveness  Also reviewed dosing sites and rotation  Showed her a Novolog flex pen so she knows what it looks like  Am concerned that current insulin is ineffective due to temperature and prolonged time exposure which might explain persistently high BG levels  Used the plate method handout and food models to demonstrate what a portion of carbohydrate food looks like  Suggested use of her hands and measuring cups to ensure accuracy  Advised 2-3 carb choices per meal  Explained that this means that she may have to save some items for later from some of the meals from [address] [place] [address]  Based on her diet recall, I think most of her delivered meals provide 4-5 carb choices each  For today's visit, developed rapport with patient, gave guidance for safe use and storage of insulin, and explained basic carbohydrate counting and consistency  This is her initial assessment    Present at session: patient    Data:   Wheelchair bound  States FBG reading this morning was in the 300's Medical Diagnosis/Reason for Referral:E11 39, E11 65  She has no special learning needs  Her caloric needs are 1230  She has had no recent weight change  Patient  shops for food  Son does some shopping while temporarily living with her  Patient  and son  cooks the food     Exercise routine:    none   She eats breakfast at  9-10 AM 1 pkt fruit flavored instant oatmeal w/ berries and milk added OR 1-2 eggs, [employer] or pumpernickel toast, hot tea   She snacks at mid AM Son may bring her cup of coffee w/ flavored creamer   She eats lunch at  11:30- AM 12:30 PM Meals on Wheels hot meal (diabetic): chicken or beef or fish or pasta w/ veggie, starch, fruit cup, 8 oz  milk    She eats dinner at  5-6 PM MOW cold supper: sandwich, side salad, milk  at bedtime Fruit cup or crackers     [address] and nutrition related knowledge deficit related to  lack of prior exposure to accurate nutrition related information  As evidenced by  new medical diagnosis or change in existing diagnosis or condition  Medical Nutrition Therapy Intervention: Plate Method, Meal planning, Strategies to monitor portion control and Meal timing  Her comprehension was very good    Her motivation was good   Her compliance was good   Goals:  1  Choose 2-3 carbohydrate foods per meal  2  Use insulin vials for only 28 days  Store unused insulin in refrigerator  3  Keep 4-5 hours between meals      Vitals  Signs   Recorded: 69NDR1578 04:02PM   Height Unobtainable: Yes  Weight Unobtainable: Yes    Future Appointments    Date/Time Provider Specialty Site   06/19/2017 02:40 PM RAJESH Cesar   [facility]   06/30/2017 02:00 PM Edwardo Torres RD Diabetes Educator [facility]CR[facility]OLOG[facility]   08/18/2017 01:15 PM Adriel Hinton DO Family Medicine [facility]   07/06/2017 03:15 PM Tc Centeno, [address] Endocrinology [facility] ENDOCRINOLOGY   06/13/2017 01:00 PM Yulisa Long MD Gynecological Oncology [facility] Jazmin Handy     Signatures   Electronically signed by : Eulalio Hayes RD; Jun 1 2017  4:33PM EST                       (Author)    Electronically signed by : RAJESH Stiles ; Jun 5 2017 10:39AM EST

## 2018-01-12 NOTE — PROGRESS NOTES
History of Present Illness  Care Coordination Encounter Information:   Type of Encounter: Telephonic   Contact: Follow-Up    Spoke to Patient   Becky Rogers  Care Coordination  Nurse [facility]: I continue to follow Becky Rogers in order to help better manage her diabetes  She disclosed a lot of snack items from her festivities on 7/4 and revealed a glucose of 301 this AM  I told her today is a new day and to not focus on yesterday and to concentrate on today's choices  She still struggles with elevated glucose levels and we will continue to discuss better food options  We agreed to meet up after her next appointment with Dr Uriel Garcia in the office  JG      Active Problems    1  Acute congestive heart failure (428 0) (I50 9)   2  Alopecia (704 00) (L65 9)   3  Anxiety (300 00) (F41 9)   4  Bladder incontinence (788 30) (R32)   5  CAD (coronary artery disease) (414 00) (I25 10)   6  Chronic diastolic CHF (congestive heart failure) (428 32,428 0) (I50 32)   7  Claudication (443 9) (I73 9)   8  Constipation (564 00) (K59 00)   9  Depression (311) (F32 9)   10  Diabetes mellitus (250 00) (E11 9)   11  Endometrial cancer (182 0) (C54 1)   12  Foot pain (729 5) (M79 673)   13  Former smoker (Z87 891)   15  Gait disturbance (781 2) (R26 9)   15  Hyperlipidemia (272 4) (E78 5)   16  Hypertension (401 9) (I10)   17  Meralgia paresthetica of right side (355 1) (G57 11)   18  Nausea (787 02) (R11 0)   19  NSTEMI, initial episode of care (410 71) (I21 4)   20  Osteoporosis (733 00) (M81 0)   21  Peripheral neuropathy (356 9) (G62 9)   22  Primary localized osteoarthritis of right knee (715 16) (M17 11)   23  Psoriasis (696 1) (L40 9)   24  Quadriceps weakness (728 87) (M62 81)   25  Spinal stenosis (724 00) (M48 00)   26  TIA (transient ischemic attack) (435 9) (G45 9)   27  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, with long-term    current use of insulin (250 52,V58 67) (E11 39,E11 65,Z79  4)    Past Medical History    1  History of Acute knee pain (719 46) (M25 569)   2  History of Acute leg pain (729 5) (M79 606)   3  History of Acute urinary tract infection (599 0) (N39 0)   4  History of Bilateral numbness and tingling of arms and legs (782 0) (R20 0,R20 2)   5  History of Foot pain (729 5) (M79 673)   6  History of Gait disturbance (781 2) (R26 9)   7  History of Gout (274 9) (M10 9)   8  History of  3   9  History of abdominal pain (V13 89) (Z87 898)   10  History of acute sinusitis (V12 69) (Z87 09)   11  History of allergic rhinitis (V12 69) (Z87 09)   12  History of dizziness (V13 89) (Z87 898)   13  History of headache (V13 89) (Z87 898)   14  History of hyperlipidemia (V12 29) (Z86 39)   15  History of nausea (V12 79) (Z87 898)   16  History of psoriatic arthritis (V13 4) (Z87 2)   17  History of sciatica (V12 49) (Z86 69)   18  History of stroke (V12 54) (Z86 73)   19  History of urinary frequency (V13 09) (Z87 898)   20  History of weakness (V13 89) (Z87 898)   21  History of Knee pain (719 46) (M25 569)   22  History of Lumbar radiculopathy (724 4) (M54 16)   23  History of Pain of lower extremity (729 5) (M79 606)   24  Personal history of arthritis (V13 4) (Z87 39)   25  History of Postmenopausal bleeding (627 1) (N95 0)   26  History of Screening for osteoporosis (V82 81) (Z13 820)   27  History of Urinary tract infection (599 0) (N39 0)   28  History of Vertigo following CVA (cerebrovascular accident) (887 12) (Q74 050,Z91)    Surgical History    1  History of Cardiac Cath Lesion 1, 1st Adjunct Treat Device: Stent   2  History of Cholecystectomy Laparoscopic   3  History of Laparoscopy With Total Hysterectomy   4  History of Pelvic Examination Under Anesthesia   5  History of Salpingo-oophorectomy Bilateral   6  History of Tubal Ligation   7  History of Umbilical Hernia Repair   8  History of Uterine Myomectomy    Family History  Mother    1   Family history of cardiac disorder (V17 49) (Z82 49)   2  Family history of hypertension (V17 49) (Z82 49)  Father    3  Family history of diabetes mellitus (V18 0) (Z83 3)   4  Family history of hypertension (V17 49) (Z82 49)   5  Family history of malignant neoplasm (V16 9) (Z80 9)   6  Family history of malignant neoplasm of prostate (V16 42) (Z80 45)  Family History    7  Family history of arthritis (V17 7) (Z82 61)   8  Family history of osteoporosis (V17 81) (Z82 62)    Social History    · Former smoker (A01 243)   · No drug use   · Social alcohol use (Z78 9)    Current Meds    1  Furosemide 40 MG Oral Tablet (Lasix); TAKE 1 TABLET DAILY  Requested for:   32JRA5716; Last Rx:02Mar2017 Ordered   2  Potassium Chloride ER 20 MEQ Oral Tablet Extended Release; One daily  Requested   for: 83KAK7033; Last Rx:02Mar2017 Ordered    3  DULoxetine HCl - 60 MG Oral Capsule Delayed Release Particles; take 1 capsule daily; Therapy: 80YYY4648 to (Evaluate:73Aup8695)  Requested for: 19RJK8280; Last   Rx:02Mar2017 Ordered   4  LORazepam 0 5 MG Oral Tablet; 1 PO BID PRN; Therapy: 71DRQ8205 to (Evaluate:08Apr2017)  Requested for: 85SLX4799; Last   Rx:46Ytw6740 Ordered    5  Syringe 30 ML Miscellaneous; USE AS DIRECTED; Therapy: 41FGE8479 to (Last Rx:05Jun2017)  Requested for: 37ZAA3653 Ordered    6  Aspirin 81 MG Oral Tablet Delayed Release; take 1 tablet by mouth daily; Therapy: 58RPV1045 to (Evaluate:73Opd3327); Last Rx:19Jun2017 Ordered   7  Atorvastatin Calcium 80 MG Oral Tablet (Lipitor); TAKE 1 TABLET AT BEDTIME    Requested for: 38WGI7495; Last Rx:02Mar2017; Status: ACTIVE - Renewal Denied   Ordered    8  Losartan Potassium 100 MG Oral Tablet; Take 1 tablet daily; Therapy: 58KTK5496 to (Evaluate:90Fuu2674)  Requested for: 12UDH1545; Last   Rx:02Mar2017 Ordered   9  Metoprolol Succinate ER 50 MG Oral Tablet Extended Release 24 Hour; TAKE 1 TABLET   DAILY  Requested for: 36BBF4751; Last Rx:02Mar2017 Ordered    10   Clopidogrel Bisulfate 75 MG Oral Tablet (Plavix); TAKE 1 TABLET DAILY  Requested for:    86UCV1761; Last Rx:02Mar2017; Status: ACTIVE - Renewal Denied Ordered    11  Gabapentin 300 MG Oral Capsule; TAKE 1 CAPSULE TWICE DAILY; Therapy: 18HVB1151 to (Evaluate:09Jun2017)  Requested for: 56HJX3219; Last    Rx:61Tjt9397 Ordered    12  Allopurinol 300 MG Oral Tablet; TAKE 1 TABLET DAILY; Therapy: 14PJS6754 to (Evaluate:10Bnk4175)  Requested for: 14QXF1517; Last    Rx:02Mar2017 Ordered    13  BD Pen Needle Jodi U/F 32G X 4 MM Miscellaneous; USE 4 TIMES A DAY; Therapy: 41QGX5074 to (Chloe Charles)  Requested for: 00FXH5357; Last    Rx:24May2017 Ordered   14  HumaLOG KwikPen 100 UNIT/ML Subcutaneous Solution Pen-injector; 15 units before    meals; Therapy: 32UIM7483 to (Evaluate:23Sep2017)  Requested for: 21PII9661; Last    Rx:26May2017 Ordered   15  Lantus SoloStar 100 UNIT/ML Subcutaneous Solution Pen-injector; USE AS DIRECTED  50 units at bedtime; Therapy: 54Cfi4662 to (Evaluate:20Sep2017)  Requested for: 88BAS5327; Last    Rx:23May2017 Ordered    16  Multi Vitamin/Minerals TABS; TAKE 1 TABLET DAILY; Therapy: (Recorded:27Dct6291) to Recorded    Allergies    1  Sulfa Drugs    Health Management   *VB - Eye Exam; every 1 year; Last 21LRN7469; Next Due: 66ICA1032; Active  *VB - Foot Exam; every 1 year; Last 82HSH3601; Next Due: 34RNS7041; Active   Medicare Annual Wellness Visit; every 1 year; Next Due: 46Niq7404; Overdue    End of Encounter Meds    1  Furosemide 40 MG Oral Tablet (Lasix); TAKE 1 TABLET DAILY  Requested for:   93UGT8493; Last Rx:02Mar2017 Ordered   2  Potassium Chloride ER 20 MEQ Oral Tablet Extended Release; One daily  Requested   for: 63CFT6034; Last Rx:02Mar2017 Ordered    3  DULoxetine HCl - 60 MG Oral Capsule Delayed Release Particles; take 1 capsule daily; Therapy: 74HFW0773 to (Evaluate:33Qww3165)  Requested for: 26LWL7985; Last   Rx:02Mar2017 Ordered   4  LORazepam 0 5 MG Oral Tablet; 1 PO BID PRN;    Therapy: 18UMH4296 to (Evaluate:08Apr2017)  Requested for: 73WDU7805; Last   Rx:87Lmu5637 Ordered    5  Syringe 30 ML Miscellaneous; USE AS DIRECTED; Therapy: 78HTN3376 to (Last Rx:05Jun2017)  Requested for: 26FXS0705 Ordered    6  Aspirin 81 MG Oral Tablet Delayed Release; take 1 tablet by mouth daily; Therapy: 10THN6092 to (Evaluate:17Sep2017); Last Rx:19Jun2017 Ordered   7  Atorvastatin Calcium 80 MG Oral Tablet (Lipitor); TAKE 1 TABLET AT BEDTIME    Requested for: 81EUV8313; Last Rx:02Mar2017; Status: ACTIVE - Renewal Denied   Ordered    8  Losartan Potassium 100 MG Oral Tablet; Take 1 tablet daily; Therapy: 30RTD1772 to (Evaluate:29Aug2017)  Requested for: 69SNP6420; Last   Rx:02Mar2017 Ordered   9  Metoprolol Succinate ER 50 MG Oral Tablet Extended Release 24 Hour; TAKE 1 TABLET   DAILY  Requested for: 44CWI7112; Last Rx:02Mar2017 Ordered    10  Clopidogrel Bisulfate 75 MG Oral Tablet (Plavix); TAKE 1 TABLET DAILY  Requested for:    42ILM3199; Last Rx:02Mar2017; Status: ACTIVE - Renewal Denied Ordered    11  Gabapentin 300 MG Oral Capsule; TAKE 1 CAPSULE TWICE DAILY; Therapy: 60FLP5867 to (Evaluate:09Jun2017)  Requested for: 78PMB7981; Last    Rx:09Feb2017 Ordered    12  Allopurinol 300 MG Oral Tablet; TAKE 1 TABLET DAILY; Therapy: 15ZFK2453 to (Evaluate:29Aug2017)  Requested for: 34DLC4963; Last    Rx:02Mar2017 Ordered    13  BD Pen Needle Jodi U/F 32G X 4 MM Miscellaneous; USE 4 TIMES A DAY; Therapy: 54LUI0554 to (Ericka Panda)  Requested for: 92AQR0161; Last    Rx:24May2017 Ordered   14  HumaLOG KwikPen 100 UNIT/ML Subcutaneous Solution Pen-injector; 15 units before    meals; Therapy: 77SGS3765 to (Evaluate:23Sep2017)  Requested for: 64BCJ6853; Last    Rx:26May2017 Ordered   15  Lantus SoloStar 100 UNIT/ML Subcutaneous Solution Pen-injector; USE AS DIRECTED  50 units at bedtime; Therapy: 14Apr2017 to (Evaluate:20Sep2017)  Requested for: 47XZS0427; Last    Rx:44Can3991 Ordered    16  Multi Vitamin/Minerals TABS; TAKE 1 TABLET DAILY; Therapy: (Recorded:59Ifo3998) to Recorded    Future Appointments    Date/Time Provider Specialty Site   07/26/2017 01:30 PM Violet Hinton DO [facility], [place], operated by [facility]   08/18/2017 01:15 PM Violet Hinton DO [facility] [place], operated by [facility]   07/06/2017 03:15 PM Aura Nicole, [address] [place] [address] Endocr[address] [facility] ENDOCRINOLOGY   08/03/2017 01:15 PM Annetta Estevez MD Gynecological Oncology [facility]     Patient Care Team    Care Team Member Role Specialty Office Number   Raul Juárez MD Referring Orthopedic Surgery [phone]   [place]  Specialist Neurosurgery [phone]   Susanne Chong MD Specialist Gynecological Oncology [phone]   King Marquez MD Specialist Radiation Oncology [phone]   [place]    Endocrinology [phone]     Signatures   Electronically signed by : Alysha Tovar RN; Jul 5 2017  2:09PM EST                       (Author)

## 2018-01-12 NOTE — MISCELLANEOUS
Provider Comments  Provider Comments:   PATIENT NO SHOWED FOR 4- APPT  Signatures   Electronically signed by : Neil Regalado MA;  Apr 11 2017 10:24AM EST                       (Author)

## 2018-01-12 NOTE — MISCELLANEOUS
Assessment    1  Diabetes mellitus (250 00) (E11 9)   2  Endometrial cancer (182 0) (C54 1)   3  Gait disturbance (781 2) (R26 9)    Plan  Diabetes mellitus    · Follow-up visit in 1 month Evaluation and Treatment  Follow-up  Status: Hold For -  Scheduling  Requested for: 25CHW3739   Ordered; For: Diabetes mellitus; Ordered By: Saadia Hall Performed:  Due: 77YCK4589    Discussion/Summary  Discussion Summary:   Reviewed meds    patient is confused about what to take and has not   at this time --we will go with what she was prescribed from the hospital  after discharge as she was seen by endo  patient has not been taking meds recently due to confusion  involved in pt/ot at home  will f/u in 1 month   reviewed with patient multiple times her meds  nees other health maint but hilary wait till next visit  as needs to let continued healing from surgery  f/u with surgery as directed  Chief Complaint  Chief Complaint Free Text Note Form: ben --hospital/rehab after surgery      History of Present Illness  TCM Communication [facility]: The patient is being contacted for follow-up after hospitalization and Had been admitted to [facility] [place] on 01/18/16 and underwent robotic TLH BSO, umbilical hernia repair, biologic mesh  [facility]  Was discharged on 02/05/16 due to insurance cut Was given 30 days supply of scripts  Diagnosis: Malignant Neoplasm of Endometrium, Acute Posthemorrhagic Anemia, Anxiety disorder, HTN, Obesity, Tachycardia, Type 2 Diabetes Umbilical Hernia without Obstruction, Ventral Hernia without Obstruction  She was discharged to home, with home health services, Discharged to home with [facility], [facility], PT and OT  Patient states PT/OT has not been set up yet, said they will be calling her  Medications reviewed and updated today  She scheduled a follow up appointment  Follow-up appointments with other specialists:  Will see the surgeon Dr Domenic Michele on 02/24/16  Incisions healing well, is having a pulling discomfort 2 incision site but patient states is controlled by Tylenol  No other symptoms  Counseling was provided to the patient  Stressed importance to follow instructions given by ISABELA  Communication performed and completed by CALEB Velazquez    HPI: here for f/u from hospital and rehab admission  still having some RLQ pain --mostly with movement  discussed diabetes -- patient was not taking meds as      Review of Systems  Complete-Female:   Constitutional: No fever, no chills, feels well, no tiredness, no recent weight gain or weight loss  Eyes: No complaints of eye pain, no red eyes, no eyesight problems, no discharge, no dry eyes, no itching of eyes  ENT: no complaints of earache, no loss of hearing, no nose bleeds, no nasal discharge, no sore throat, no hoarseness  Cardiovascular: No complaints of slow heart rate, no fast heart rate, no chest pain, no palpitations, no leg claudication, no lower extremity edema  Respiratory: No complaints of shortness of breath, no wheezing, no cough, no SOB on exertion, no orthopnea, no PND  Gastrointestinal: abdominal pain, but as noted in HPI  Genitourinary: No complaints of dysuria, no incontinence, no pelvic pain, no dysmenorrhea, no vaginal discharge or bleeding  Musculoskeletal: No complaints of arthralgias, no myalgias, no joint swelling or stiffness, no limb pain or swelling  Integumentary: No complaints of skin rash or lesions, no itching, no skin wounds, no breast pain or lump  Neurological: No complaints of headache, no confusion, no convulsions, no numbness, no dizziness or fainting, no tingling, no limb weakness, no difficulty walking  Psychiatric: Not suicidal, no sleep disturbance, no anxiety or depression, no change in personality, no emotional problems  Endocrine: No complaints of proptosis, no hot flashes, no muscle weakness, no deepening of the voice, no feelings of weakness  Hematologic/Lymphatic: No complaints of swollen glands, no swollen glands in the neck, does not bleed easily, does not bruise easily  Active Problems    1  Alopecia (704 00) (L65 9)   2  Anxiety (300 00) (F41 9)   3  Bladder incontinence (788 30) (R32)   4  Diabetes mellitus (250 00) (E11 9)   5  Endometrial cancer (182 0) (C54 1)   6  Gait disturbance (781 2) (R26 9)   7  Hypertension (401 9) (I10)   8  Lower abdominal pain (789 09) (R10 30)   9  Meralgia paresthetica of right side (355 1) (G57 11)   10  Postmenopausal bleeding (627 1) (N95 0)   11  Primary localized osteoarthritis of right knee (715 16) (M17 11)   12  Quadriceps weakness (728 87) (M62 81)   13  Skin tag (701 9) (L91 8)   14  Spinal stenosis (724 00) (M48 00)   15  TIA (transient ischemic attack) (435 9) (G45 9)    Past Medical History    1  History of Acute knee pain (719 46) (M25 569)   2  History of Bilateral numbness and tingling of arms and legs (782 0) (R20 2)   3  History of Flu vaccine need (V04 81) (Z23)   4  History of Flu vaccine need (V04 81) (Z23)   5  History of Foot pain (729 5) (M79 673)   6  History of Gait disturbance (781 2) (R26 9)   7  History of Gout (274 9) (M10 9)   8  History of  3   9  History of abdominal pain (V13 89) (Z87 898)   10  History of acute sinusitis (V12 69) (Z87 09)   11  History of allergic rhinitis (V12 69) (Z87 09)   12  History of dizziness (V13 89) (Z87 898)   13  History of headache (V13 89) (Z87 898)   14  History of hyperlipidemia (V12 29) (Z86 39)   15  History of nausea (V12 79) (Z87 898)   16  History of psoriatic arthritis (V13 4) (Z87 2)   17  History of sciatica (V12 49) (Z86 69)   18  History of stroke (V12 54) (Z86 73)   19  History of urinary frequency (V13 09) (Z87 898)   20  History of weakness (V13 89) (Z87 898)   21  History of Knee pain (719 46) (M25 569)   22  History of Lumbar radiculopathy (724 4) (M54 16)   23  History of Pain of lower extremity (729 5) (M79 606)   24  Personal history of arthritis (V13 4) (Z87 39)   25  History of Urinary tract infection (599 0) (N39 0)   26  History of Vertigo following CVA (cerebrovascular accident) (855 12) (Q99 401,T05)    Surgical History    1  History of Cholecystectomy Laparoscopic   2  History of Tubal Ligation   3  History of Uterine Myomectomy  Surgical History Reviewed: The surgical history was reviewed and updated today  Family History    1  Family history of cardiac disorder (V17 49) (Z82 49)   2  Family history of hypertension (V17 49) (Z82 49)    3  Family history of diabetes mellitus (V18 0) (Z83 3)   4  Family history of hypertension (V17 49) (Z82 49)   5  Family history of malignant neoplasm (V16 9) (Z80 9)   6  Family history of malignant neoplasm of prostate (V16 42) (Z80 42)    7  Family history of arthritis (V17 7) (Z82 61)   8  Family history of osteoporosis (V17 81) (Z82 62)  Family History Reviewed: The family history was reviewed and updated today  Social History    · Former smoker (Y21 23) (N24 756)   · No drug use   · Social alcohol use (F10 99)  Social History Reviewed: The social history was reviewed and updated today  The social history was reviewed and is unchanged  Current Meds   1  Allopurinol 300 MG Oral Tablet; TAKE 1 TABLET DAILY; Therapy: 94EZW4634 to (Evaluate:01Jun2016)  Requested for: 92VAW4266; Last   Rx:66Ywh0142 Ordered   2  Aspirin 325 MG Oral Tablet; TAKE 1 TABLET DAILY; Therapy: 66JHR2557 to Recorded   3  Diltiazem HCl  MG Oral Capsule Extended Release 24 Hour; TAKE 1 CAPSULE   DAILY; Therapy: 20AIL3529 to (Evaluate:40Zwb4665)  Requested for: 070-073-058; Last   Rx:60Rhd3562 Ordered   4  DULoxetine HCl - 60 MG Oral Capsule Delayed Release Particles; take 1 capsule daily; Therapy: 15RNH6235 to (Evaluate:14Mgm6133)  Requested for: 12ZUJ0921; Last   Rx:11Jan2016 Ordered   5   Fluticasone Propionate 50 MCG/ACT Nasal Suspension; USE 2 SPRAYS IN EACH   NOSTRIL DAILY; Therapy: 22PRS6294 to (Evaluate:12Jun2015)  Requested for: 55CRP5840; Last   Rx:04Qok4733 Ordered   6  HumaLOG 100 UNIT/ML Subcutaneous Solution; 30units tid with meals; Therapy: 08RYU3281 to Recorded   7  HumaLOG Mix 75/25 KwikPen (75-25) 100 UNIT/ML Subcutaneous Suspension   Pen-injector; INJECT 40 UNIT Twice daily; Therapy: 89KNB6591 to (Munira Keane)  Requested for: 55AEQ0033; Last   Rx:50Fju6584 Ordered   8  LORazepam 0 5 MG Oral Tablet; 1 PO BID PRN; Therapy: 40RVI8757 to (Evaluate:22Apr2016); Last Rx:99Knh2593 Ordered   9  Losartan Potassium 100 MG Oral Tablet; Take 1 tablet daily; Therapy: 09GKD9521 to (Evaluate:57Fgc4050)  Requested for: 16Nbg3337; Last   Rx:44Zbb0717 Ordered   10  Multi Vitamin/Minerals TABS; TAKE 1 TABLET DAILY; Therapy: (Shay Foster) to Recorded   11  TraMADol HCl - 50 MG Oral Tablet; TAKE 1 TABLET EVERY 6 HOURS AS NEEDED FOR    PAIN;    Therapy: 29RAV4614 to (Evaluate:26Nov2015); Last Rx:12Nov2015 Ordered  Medication List Reviewed: The medication list was reviewed and updated today  Allergies    1  Sulfa Drugs    Vitals  Signs [Data Includes: Current Encounter]   Recorded: 69ESU0826 01:45PM   Heart Rate: 108, L Radial  Pulse Quality: Regular  Blood Pressure: 80 mm Hg, LUE, Sitting  Height: 5 ft 1 in  Weight: 178 lb   BMI Calculated: 33 63 kg/m2  BSA Calculated: 1 79 m2  Blood Pressure: 160 mm Hg, LUE, Sitting    Physical Exam    Constitutional   General appearance: No acute distress, well appearing and well nourished  Pulmonary   Respiratory effort: No increased work of breathing or signs of respiratory distress  Auscultation of lungs: Clear to auscultation  Cardiovascular   Auscultation of heart: Normal rate and rhythm, normal S1 and S2, without murmurs  Abdomen   Abdomen: Non-tender, no masses  Liver and spleen: No hepatomegaly or splenomegaly  Musculoskeletal   Gait and station: Abnormal   + still using wheelchair     Digits and nails: Normal without clubbing or cyanosis  Inspection/palpation of joints, bones, and muscles: Normal          Health Management  Diabetes mellitus   *VB - Eye Exam; every 1 year; Last 91VWW7568; Next Due: [id number]; Overdue  *VB-Foot Exam; every 1 year; Last 72UML4315; Next Due: 17Lkg9388; Active  Health Maintenance   Medicare Annual Wellness Visit; every 1 year; Next Due: 63Mqe0864;  Overdue    Future Appointments    Date/Time Provider Specialty Site   02/24/2016 01:15 PM Liat Marrero MD Gynecological Oncology [facility]     Signatures   Electronically signed by : Maira Mcdaniel DO; Feb 11 2016  2:31PM EST                       (Author)

## 2018-01-12 NOTE — MISCELLANEOUS
Message   Recorded as Task   Date: 03/28/2017 04:03 PM, Created By: Emelina Clark   Task Name: Care Coordination   Assigned To: Tereza Carrillo   Regarding Patient: Juan Ramon Corrales, Status: Active   Comment:    Migdalia Knowles - 28 Mar 2017 4:03 PM     TASK CREATED  Caller: PAULINA, Care Giver; Care Coordination  JEANCARLOS FELL LAST SATURDAY EARLY IN THE MORNING, DIDN'T GO TO THE HOSPTAL BUT THEY DID CALL AND AMBULANCE  ALSO HER BS  IS ABOUT 213 AND SHE HAS AN APPT 4/11 WITH ENDO, IS THERE ANYTHING YOU WANT HER TO DO?    [address]   Page,Nate - 28 Mar 2017 4:07 PM     TASK EDITED  nothing to do at this time  Tereza Carrillo - 28 Mar 2017 4:52 PM     TASK EDITED  Brady Kim advised  PLM        Active Problems    1  Acute congestive heart failure (428 0) (I50 9)   2  Alopecia (704 00) (L65 9)   3  Anxiety (300 00) (F41 9)   4  Bladder incontinence (788 30) (R32)   5  CAD (coronary artery disease) (414 00) (I25 10)   6  Chronic diastolic CHF (congestive heart failure) (428 32,428 0) (I50 32)   7  Claudication (443 9) (I73 9)   8  Constipation (564 00) (K59 00)   9  Depression (311) (F32 9)   10  Diabetes mellitus (250 00) (E11 9)   11  Encounter for screening mammogram for breast cancer (V76 12) (Z12 31)   12  Endometrial cancer (182 0) (C54 1)   13  Foot pain (729 5) (M79 673)   14  Former smoker (Z87 891)   13  Gait disturbance (781 2) (R26 9)   16  Hyperlipidemia (272 4) (E78 5)   17  Hypertension (401 9) (I10)   18  Meralgia paresthetica of right side (355 1) (G57 11)   19  Nausea (787 02) (R11 0)   20  NSTEMI, initial episode of care (410 71) (I21 4)   21  Osteoporosis (733 00) (M81 0)   22  Peripheral neuropathy (356 9) (G62 9)   23  Primary localized osteoarthritis of right knee (715 16) (M17 11)   24  Psoriasis (696 1) (L40 9)   25  Quadriceps weakness (728 87) (M62 81)   26  Screening for osteoporosis (V82 81) (Z13 820)   27  Spinal stenosis (724 00) (M48 00)   28   TIA (transient ischemic attack) (435 9) (G45 9)    Current Meds   1  Allopurinol 300 MG Oral Tablet; TAKE 1 TABLET DAILY; Therapy: 68WGL1519 to (Evaluate:90Uqz8810)  Requested for: 23PBR6235; Last   Rx:02Mar2017 Ordered   2  Aspirin 325 MG Oral Tablet; TAKE 1 TABLET DAILY; Therapy: 75YNW4741 to Recorded   3  Atorvastatin Calcium 80 MG Oral Tablet (Lipitor); TAKE 1 TABLET AT BEDTIME    Requested for: 20XZW6513; Last Rx:02Mar2017 Ordered   4  Basaglar KwikPen 100 UNIT/ML Subcutaneous Solution Pen-injector; 90 units daily; Therapy: (Recorded:42Vou9016) to Recorded   5  BD Insulin Syringe 30G X 1/2" 0 5 ML Miscellaneous; USE AS DIRECTED; Therapy: 06XFZ6131 to (Last Rx:01Nvd5397)  Requested for: 42KMZ1838 Ordered   6  Clopidogrel Bisulfate 75 MG Oral Tablet (Plavix); TAKE 1 TABLET DAILY  Requested for:   80RZB5035; Last Rx:02Mar2017 Ordered   7  DULoxetine HCl - 60 MG Oral Capsule Delayed Release Particles; take 1 capsule daily; Therapy: 33ETZ9609 to (Evaluate:29Aug2017)  Requested for: 60ITZ2018; Last   Rx:02Mar2017 Ordered   8  Furosemide 40 MG Oral Tablet (Lasix); TAKE 1 TABLET DAILY  Requested for:   16FKV7719; Last Rx:02Mar2017 Ordered   9  Gabapentin 300 MG Oral Capsule; TAKE 1 CAPSULE TWICE DAILY; Therapy: 18NBS9829 to (Evaluate:09Jun2017)  Requested for: 65IYD1042; Last   Rx:09Feb2017 Ordered   10  GlipiZIDE 5 MG Oral Tablet; TAKE 1 TABLET DAILY  Requested for: 21EKG8997; Last    Rx:02Mar2017 Ordered   11  HumaLOG 100 UNIT/ML Subcutaneous Solution; INJECT 8 UNIT Before meals; Therapy: 28UUB6292 to (Evaluate:24Jun2017)  Requested for: 69EMW2527; Last    Rx:24Feb2017 Ordered   12  LORazepam 0 5 MG Oral Tablet; 1 PO BID PRN; Therapy: 54AGL8860 to (Evaluate:08Apr2017)  Requested for: 61HQK9162; Last    Rx:54Frx9298 Ordered   13  Losartan Potassium 100 MG Oral Tablet; Take 1 tablet daily; Therapy: 87JTX2216 to (Evaluate:01Yph4324)  Requested for: 23RUJ8938; Last    Rx:02Mar2017 Ordered   14  Metoprolol Succinate ER 50 MG Oral Tablet Extended Release 24 Hour; TAKE 1 TABLET    DAILY  Requested for: 00MPY3785; Last Rx:02Mar2017 Ordered   15  Multi Vitamin/Minerals TABS; TAKE 1 TABLET DAILY; Therapy: (Freda Ling) to Recorded   16  Potassium Chloride ER 20 MEQ Oral Tablet Extended Release; One daily  Requested    for: 10LQI6860; Last Rx:02Mar2017 Ordered    Allergies    1   Sulfa Drugs    Signatures   Electronically signed by : Humble Martinez, ; Mar 28 2017  4:52PM EST                       (Author)

## 2018-01-12 NOTE — PROGRESS NOTES
History of Present Illness  Care Coordination Encounter Information:   Type of Encounter: Telephonic   Contact: Initial Contact    Spoke to Patient   Kandi Lofton  Care Coordination  Nurse [facility]: This was my first contact with the very pleasant Kandi Lofton and she agreed to converse with me in reference to her health  She claims that her breathing is "pretty good" and denies chest pain  She says that she has the continuation of swollen ankles and does not weigh herself but that most recent weight was 177  I did make her aware of the need to weigh herself daily for the management of her CHF  She claims to have knowledge for the need of a low sodium diet  For breakfast she consumed 3 eggs and 2 pieces of toast and had chicken and rice for lunch  She did not check her glucose because her "pen to check sugar" was malfunctioning but her VNA fixed it and she will continue to check BID  She has VNA in 2-3/wk and a home health worker daily 2-3 hrs daily  I will reach out as necessary  Medication reconciliation not performed  JG      Active Problems    1  Acute congestive heart failure (428 0) (I50 9)   2  Alopecia (704 00) (L65 9)   3  Anxiety (300 00) (F41 9)   4  Bladder incontinence (788 30) (R32)   5  CAD (coronary artery disease) (414 00) (I25 10)   6  Chronic diastolic CHF (congestive heart failure) (428 32,428 0) (I50 32)   7  Claudication (443 9) (I73 9)   8  Constipation (564 00) (K59 00)   9  Depression (311) (F32 9)   10  Diabetes mellitus (250 00) (E11 9)   11  Encounter for screening mammogram for breast cancer (V76 12) (Z12 31)   12  Endometrial cancer (182 0) (C54 1)   13  Foot pain (729 5) (M79 673)   14  Former smoker (Z87 891)   13  Gait disturbance (781 2) (R26 9)   16  Hyperlipidemia (272 4) (E78 5)   17  Hypertension (401 9) (I10)   18  Meralgia paresthetica of right side (355 1) (G57 11)   19  Nausea (787 02) (R11 0)   20  NSTEMI, initial episode of care (410 71) (I21 4)   21   Osteoporosis (733 00) (M81 0)   22  Peripheral neuropathy (356 9) (G62 9)   23  Primary localized osteoarthritis of right knee (715 16) (M17 11)   24  Psoriasis (696 1) (L40 9)   25  Quadriceps weakness (728 87) (M62 81)   26  Screening for osteoporosis (V82 81) (Z13 820)   27  Spinal stenosis (724 00) (M48 00)   28  TIA (transient ischemic attack) (435 9) (G45 9)    Past Medical History    1  History of Acute knee pain (719 46) (M25 569)   2  History of Acute leg pain (729 5) (M79 606)   3  History of Acute urinary tract infection (599 0) (N39 0)   4  History of Bilateral numbness and tingling of arms and legs (782 0) (R20 2)   5  History of Flu vaccine need (V04 81) (Z23)   6  History of Flu vaccine need (V04 81) (Z23)   7  History of Foot pain (729 5) (M79 673)   8  History of Gait disturbance (781 2) (R26 9)   9  History of Gout (274 9) (M10 9)   10  History of  3   11  History of abdominal pain (V13 89) (Z87 898)   12  History of acute sinusitis (V12 69) (Z87 09)   13  History of allergic rhinitis (V12 69) (Z87 09)   14  History of dizziness (V13 89) (Z87 898)   15  History of headache (V13 89) (Z87 898)   16  History of hyperlipidemia (V12 29) (Z86 39)   17  History of nausea (V12 79) (Z87 898)   18  History of psoriatic arthritis (V13 4) (Z87 2)   19  History of sciatica (V12 49) (Z86 69)   20  History of stroke (V12 54) (Z86 73)   21  History of urinary frequency (V13 09) (Z87 898)   22  History of weakness (V13 89) (Z87 898)   23  History of Knee pain (719 46) (M25 569)   24  History of Lumbar radiculopathy (724 4) (M54 16)   25  History of Pain of lower extremity (729 5) (M79 606)   26  Personal history of arthritis (V13 4) (Z87 39)   27  History of Postmenopausal bleeding (627 1) (N95 0)   28  History of Screening for osteoporosis (V82 81) (Z13 820)   29  History of Urinary tract infection (599 0) (N39 0)   30  History of Vertigo following CVA (cerebrovascular accident) (725 63) (J92 190,T67)    Surgical History    1  History of Cardiac Cath Lesion 1, 1st Adjunct Treat Device: Stent   2  History of Cholecystectomy Laparoscopic   3  History of Laparoscopy With Total Hysterectomy   4  History of Pelvic Examination Under Anesthesia   5  History of Salpingo-oophorectomy Bilateral   6  History of Tubal Ligation   7  History of Umbilical Hernia Repair   8  History of Uterine Myomectomy    Family History  Mother    1  Family history of cardiac disorder (V17 49) (Z82 49)   2  Family history of hypertension (V17 49) (Z82 49)  Father    3  Family history of diabetes mellitus (V18 0) (Z83 3)   4  Family history of hypertension (V17 49) (Z82 49)   5  Family history of malignant neoplasm (V16 9) (Z80 9)   6  Family history of malignant neoplasm of prostate (V16 42) (Z80 45)  Family History    7  Family history of arthritis (V17 7) (Z82 61)   8  Family history of osteoporosis (V17 81) (Z82 62)    Social History    · Former smoker (W61 622)   · No drug use   · Social alcohol use (Z78 9)    Current Meds    1  Lasix 40 MG Oral Tablet (Furosemide); TAKE 1 TABLET DAILY; Therapy: (Recorded:23Jan2017) to Recorded   2  Potassium Chloride ER 20 MEQ Oral Tablet Extended Release; One daily; Therapy: (Recorded:23Jan2017) to Recorded    3  DULoxetine HCl - 60 MG Oral Capsule Delayed Release Particles; take 1 capsule daily; Therapy: 57VHW3994 to (Evaluate:07Aug2017)  Requested for: 40QTL9763; Last   Rx:39Hyf7273 Ordered   4  LORazepam 0 5 MG Oral Tablet; 1 PO BID PRN; Therapy: 30OPL2950 to (Evaluate:08Apr2017)  Requested for: 89CRO1956; Last   Rx:00Qgn0377 Ordered    5  BD Insulin Syringe 30G X 1/2" 0 5 ML Miscellaneous; USE AS DIRECTED; Therapy: 42PXQ5881 to (Last Rx:69Msz6839)  Requested for: 89TPX0320 Ordered   6  HumaLOG 100 UNIT/ML Subcutaneous Solution; INJECT 8 UNIT Before meals; Therapy: 98RYY5560 to (Evaluate:24Jun2017)  Requested for: 72PBS5989; Last   Rx:44Tjz3327 Ordered    7   Lipitor 80 MG Oral Tablet (Atorvastatin Calcium); TAKE 1 TABLET AT BEDTIME; Therapy: (EFBQJYBR:54AMI0075) to Recorded    8  Losartan Potassium 100 MG Oral Tablet; Take 1 tablet daily; Therapy: 39CHR5503 to (Evaluate:10Oct2016)  Requested for: 17Awi8077; Last   Rx:10Sep2016 Ordered   9  Metoprolol Succinate ER 50 MG Oral Tablet Extended Release 24 Hour; TAKE 1 TABLET   DAILY; Therapy: (HZXZBXJF:79HOR6041) to Recorded    10  Plavix 75 MG Oral Tablet (Clopidogrel Bisulfate); TAKE 1 TABLET DAILY; Therapy: (Marian Deutsch) to Recorded    11  Gabapentin 300 MG Oral Capsule; TAKE 1 CAPSULE TWICE DAILY; Therapy: 37SNB0125 to (Evaluate:09Jun2017)  Requested for: 03TMN6347; Last    Rx:10Npm7923 Ordered    12  Allopurinol 300 MG Oral Tablet; TAKE 1 TABLET DAILY; Therapy: 39LYU3864 to (Evaluate:00Yib0486)  Requested for: 74GEY9799; Last    Rx:14Nov2016 Ordered    13  Aspirin 325 MG Oral Tablet; TAKE 1 TABLET DAILY; Therapy: 08FKU4010 to Recorded   14  Basaglar KwikPen 100 UNIT/ML Subcutaneous Solution Pen-injector; 90 units daily; Therapy: (Recorded:88Tnq9362) to Recorded   15  GlipiZIDE 5 MG Oral Tablet; TAKE 1 TABLET DAILY; Therapy: (Recorded:07Feb2017) to Recorded   16  Multi Vitamin/Minerals TABS; TAKE 1 TABLET DAILY; Therapy: (Recorded:86Bvn1529) to Recorded    Allergies    1  Sulfa Drugs    Health Management   *VB - Eye Exam; every 1 year; Last 83ZAR2111; Next Due: 19MPU6946; Active  *VB - Foot Exam; every 1 year; Last 87BCM0242; Next Due: 20CVD3550; Active   Medicare Annual Wellness Visit; every 1 year; Next Due: 43Lqs6876; Overdue    End of Encounter Meds    1  Lasix 40 MG Oral Tablet (Furosemide); TAKE 1 TABLET DAILY; Therapy: (Recorded:23Jan2017) to Recorded   2  Potassium Chloride ER 20 MEQ Oral Tablet Extended Release; One daily; Therapy: (Recorded:23Jan2017) to Recorded    3  DULoxetine HCl - 60 MG Oral Capsule Delayed Release Particles; take 1 capsule daily;    Therapy: 36LPN3562 to (Evaluate:41Gnh9358)  Requested for: 20POV7548; Last Rx:19Qvh4102 Ordered   4  LORazepam 0 5 MG Oral Tablet; 1 PO BID PRN; Therapy: 24WWY2473 to (Evaluate:08Apr2017)  Requested for: 83WBC9834; Last   Rx:73Fjj9510 Ordered    5  BD Insulin Syringe 30G X 1/2" 0 5 ML Miscellaneous; USE AS DIRECTED; Therapy: 58QMS0941 to (Last Rx:34Xdb9665)  Requested for: 11PZY6539 Ordered   6  HumaLOG 100 UNIT/ML Subcutaneous Solution; INJECT 8 UNIT Before meals; Therapy: 35DLK5592 to (Evaluate:24Jun2017)  Requested for: 12OOU3596; Last   Rx:91Bmc3871 Ordered    7  Lipitor 80 MG Oral Tablet (Atorvastatin Calcium); TAKE 1 TABLET AT BEDTIME; Therapy: (YOVLNAPI:44TKW1940) to Recorded    8  Losartan Potassium 100 MG Oral Tablet; Take 1 tablet daily; Therapy: 98PJQ9659 to (Evaluate:10Oct2016)  Requested for: 51Zds0113; Last   Rx:07Xyi4608 Ordered   9  Metoprolol Succinate ER 50 MG Oral Tablet Extended Release 24 Hour; TAKE 1 TABLET   DAILY; Therapy: (OTMRLCFA:74UFS3266) to Recorded    10  Plavix 75 MG Oral Tablet (Clopidogrel Bisulfate); TAKE 1 TABLET DAILY; Therapy: () to Recorded    11  Gabapentin 300 MG Oral Capsule; TAKE 1 CAPSULE TWICE DAILY; Therapy: 95NNY0751 to (Evaluate:09Jun2017)  Requested for: 38UZZ3372; Last    Rx:25Zuk0040 Ordered    12  Allopurinol 300 MG Oral Tablet; TAKE 1 TABLET DAILY; Therapy: 85UKE4422 to (Evaluate:13May2017)  Requested for: 25WUD5418; Last    Rx:14Nov2016 Ordered    13  Aspirin 325 MG Oral Tablet; TAKE 1 TABLET DAILY; Therapy: 24NVD8034 to Recorded   14  Basaglar KwikPen 100 UNIT/ML Subcutaneous Solution Pen-injector; 90 units daily; Therapy: (Recorded:96Qzh0052) to Recorded   15  GlipiZIDE 5 MG Oral Tablet; TAKE 1 TABLET DAILY; Therapy: (Recorded:86Wmi6148) to Recorded   16  Multi Vitamin/Minerals TABS; TAKE 1 TABLET DAILY; Therapy: (Recorded:17Brq7087) to Recorded    Future Appointments    Date/Time Provider Specialty Site   04/11/2017 10:20 AM RAJESH Rao   Endocrinology [facility] 05/19/2017 01:15 PM Colt Hinton DO Family Medicine [facility] [place]   03/21/2017 10:30 AM Ricci oRsales MD Gynecological Oncology [address] [place] [address]     Patient Care Team    Care Team Member Role Specialty Office Number   Bernard Sousa MD Referring Orthopedic Surgery [phone]   [place]   Specialist Neurosurgery [phone]   Ricci Rosales MD Specialist Gynecological Oncology [phone] [address] [phone]     Signatures   Electronically signed by : Isaias Cross RN; Mar  2 2017  1:12PM EST                       (Author)    Electronically signed by : Isaias Cross RN; Mar  2 2017  1:14PM EST                       (Author)

## 2018-01-13 VITALS
HEART RATE: 96 BPM | WEIGHT: 183 LBS | BODY MASS INDEX: 34.55 KG/M2 | DIASTOLIC BLOOD PRESSURE: 72 MMHG | SYSTOLIC BLOOD PRESSURE: 130 MMHG | TEMPERATURE: 98.9 F | HEIGHT: 61 IN

## 2018-01-13 VITALS
BODY MASS INDEX: 33.61 KG/M2 | HEIGHT: 61 IN | DIASTOLIC BLOOD PRESSURE: 60 MMHG | HEART RATE: 76 BPM | SYSTOLIC BLOOD PRESSURE: 116 MMHG | WEIGHT: 178 LBS

## 2018-01-13 VITALS — TEMPERATURE: 97 F | HEART RATE: 80 BPM | DIASTOLIC BLOOD PRESSURE: 70 MMHG | SYSTOLIC BLOOD PRESSURE: 104 MMHG

## 2018-01-13 VITALS — DIASTOLIC BLOOD PRESSURE: 60 MMHG | HEART RATE: 88 BPM | SYSTOLIC BLOOD PRESSURE: 104 MMHG

## 2018-01-13 NOTE — CONSULTS
Chief Complaint  Pre-Op for hysterectomy  Patient completed EKG, blood work, chest x-ray before appt at hospital       History of Present Illness  HPI: here for pre-op clearance  switched insulin to pen-- and cannot remember how to inject herself   patient is very anxious today  complaining of irritable bowel symptoms  Review of Systems    Constitutional: No fever, no chills, feels well, no tiredness, no recent weight gain or weight loss  Eyes: No complaints of eye pain, no red eyes, no eyesight problems, no discharge, no dry eyes, no itching of eyes  ENT: no complaints of earache, no loss of hearing, no nose bleeds, no nasal discharge, no sore throat, no hoarseness  Cardiovascular: No complaints of slow heart rate, no fast heart rate, no chest pain, no palpitations, no leg claudication, no lower extremity edema  Respiratory: No complaints of shortness of breath, no wheezing, no cough, no SOB on exertion, no orthopnea, no PND  Gastrointestinal: abdominal pain  Genitourinary: No complaints of dysuria, no incontinence, no pelvic pain, no dysmenorrhea, no vaginal discharge or bleeding  Musculoskeletal: as noted in HPI  Integumentary: No complaints of skin rash or lesions, no itching, no skin wounds, no breast pain or lump  Neurological: No complaints of headache, no confusion, no convulsions, no numbness, no dizziness or fainting, no tingling, no limb weakness, no difficulty walking  Psychiatric: Not suicidal, no sleep disturbance, no anxiety or depression, no change in personality, no emotional problems  Endocrine: No complaints of proptosis, no hot flashes, no muscle weakness, no deepening of the voice, no feelings of weakness  Hematologic/Lymphatic: No complaints of swollen glands, no swollen glands in the neck, does not bleed easily, does not bruise easily  Active Problems    1  Alopecia (704 00) (L65 9)   2  Anxiety (300 00) (F41 9)   3  Bladder incontinence (788 30) (R32)   4  Diabetes mellitus (250 00) (E11 9)   5  Endometrial cancer (182 0) (C54 1)   6  Hypertension (401 9) (I10)   7  Lower abdominal pain (789 09) (R10 30)   8  Meralgia paresthetica of right side (355 1) (G57 11)   9  Postmenopausal bleeding (627 1) (N95 0)   10  Primary localized osteoarthritis of right knee (715 16) (M17 11)   11  Quadriceps weakness (728 87) (M62 81)   12  Skin tag (701 9) (L91 8)   13  Spinal stenosis (724 00) (M48 00)   14  TIA (transient ischemic attack) (435 9) (G45 9)    Past Medical History    · History of Acute knee pain (719 46) (M25 569)   · History of Bilateral numbness and tingling of arms and legs (782 0) (R20 2)   · History of Flu vaccine need (V04 81) (Z23)   · History of Flu vaccine need (V04 81) (Z23)   · History of Foot pain (729 5) (M79 673)   · History of Gait disturbance (781 2) (R26 9)   · History of Gout (274 9) (M10 9)   · History of  3   · History of abdominal pain (V13 89) (X72 579)   · History of acute sinusitis (V12 69) (Z87 09)   · History of allergic rhinitis (V12 69) (Z87 09)   · History of dizziness (V13 89) (X14 690)   · History of headache (V13 89) (M15 100)   · History of hyperlipidemia (V12 29) (Z86 39)   · History of nausea (V12 79) (Z87 898)   · History of psoriatic arthritis (V13 4) (Z87 2)   · History of sciatica (V12 49) (Z86 69)   · History of stroke (V12 54) (Z86 73)   · History of urinary frequency (V13 09) (T11 587)   · History of weakness (V13 89) (U59 740)   · History of Knee pain (719 46) (M25 569)   · History of Lumbar radiculopathy (724 4) (M54 16)   · History of Pain of lower extremity (729 5) (M79 606)   · Personal history of arthritis (V13 4) (Z87 39)   · History of Urinary tract infection (599 0) (N39 0)   · History of Vertigo following CVA (cerebrovascular accident) (438 85) (L58 995,B18)    The active problems and past medical history were reviewed and updated today        Surgical History    · History of Cholecystectomy Laparoscopic   · History of Tubal Ligation   · History of Uterine Myomectomy    The surgical history was reviewed and updated today  Family History    · Family history of cardiac disorder (V17 49) (Z82 49)   · Family history of hypertension (V17 49) (Z82 49)    · Family history of diabetes mellitus (V18 0) (Z83 3)   · Family history of hypertension (V17 49) (Z82 49)   · Family history of malignant neoplasm (V16 9) (Z80 9)   · Family history of malignant neoplasm of prostate (V16 42) (Z80 42)    · Family history of arthritis (V17 7) (Z82 61)   · Family history of osteoporosis (V17 81) (Z82 62)    The family history was reviewed and updated today  Social History    · Former smoker (S41 23) (V62 929)   · No drug use   · Social alcohol use (F10 99)  The social history was reviewed and updated today  The social history was reviewed and is unchanged  Current Meds   1  Allopurinol 300 MG Oral Tablet; TAKE 1 TABLET DAILY; Therapy: 55TCW7143 to (Evaluate:01Jun2016)  Requested for: 53KNF3986; Last   Rx:90Cfh1415 Ordered   2  Aspirin 325 MG Oral Tablet; TAKE 1 TABLET DAILY; Therapy: 22OSO5913 to Recorded   3  Diltiazem HCl  MG Oral Capsule Extended Release 24 Hour; TAKE 1 CAPSULE   DAILY; Therapy: 40OCH9363 to (Evaluate:24Apr2016)  Requested for: [phone]; Last   Rx:77Pse6541 Ordered   4  DULoxetine HCl - 30 MG Oral Capsule Delayed Release Particles; take 1 capsule by   mouth once daily; Therapy: 55ZKO2154 to (Evaluate:38Cgi4426)  Requested for: 41Tgp6808; Last   Rx:71Lno2251 Ordered   5  Fluticasone Propionate 50 MCG/ACT Nasal Suspension; USE 2 SPRAYS IN EACH   NOSTRIL DAILY; Therapy: 27BAP6794 to (Evaluate:12Jun2015)  Requested for: 10OTR1230; Last   Rx:80Zch4786 Ordered   6  HumaLOG Mix 75/25 KwikPen (75-25) 100 UNIT/ML Subcutaneous Suspension   Pen-injector; INJECT 40 UNIT Twice daily; Therapy: 11RLI6399 to (Kriss Iraheta)  Requested for: 47JMX3050; Last   Rx:86Fju8957 Ordered   7   LORazepam 0 5 MG Oral Tablet; 1 PO BID PRN; Therapy: 31WKK7480 to (Evaluate:22Apr2016); Last Rx:28Kpr0161 Ordered   8  Losartan Potassium 100 MG Oral Tablet; Take 1 tablet daily; Therapy: 53JYX2313 to (Evaluate:63Dmv3187)  Requested for: 22Hmy8782; Last   Rx:49Gap7177 Ordered   9  Multi Vitamin/Minerals TABS; TAKE 1 TABLET DAILY; Therapy: (Gayatri Worrell) to Recorded   10  TraMADol HCl - 50 MG Oral Tablet; TAKE 1 TABLET EVERY 6 HOURS AS NEEDED FOR    PAIN;    Therapy: 38OVZ0895 to (Evaluate:26Nov2015); Last Rx:12Nov2015 Ordered    The medication list was reviewed and updated today  Allergies    1  Sulfa Drugs    Vitals  Signs [Data Includes: Current Encounter]    Temperature: 98 7 F, Tympanic  Heart Rate: 102  Respiration: 19  Systolic: 151, LUE, Sitting  Diastolic: 80, LUE, Sitting  Height: 5 ft 1 in  Weight: 183 lb 2 08 oz  BMI Calculated: 34 6  BSA Calculated: 1 81    Physical Exam    Constitutional   General appearance: No acute distress, well appearing and well nourished  Ears, Nose, Mouth, and Throat   Otoscopic examination: Tympanic membranes translucent with normal light reflex  Canals patent without erythema  Oropharynx: Normal with no erythema, edema, exudate or lesions  Neck   Neck: Supple, symmetric, trachea midline, no masses  Pulmonary   Respiratory effort: No increased work of breathing or signs of respiratory distress  Auscultation of lungs: Clear to auscultation  Cardiovascular   Auscultation of heart: Normal rate and rhythm, normal S1 and S2, no murmurs  Abdomen   Abdomen: Non-tender, no masses  non tender to palp  Liver and spleen: No hepatomegaly or splenomegaly  Lymphatic   Palpation of lymph nodes in neck: No lymphadenopathy  Musculoskeletal   Gait and station: Abnormal   using wheelchair  Digits and nails: Normal without clubbing or cyanosis  Joints, bones, and muscles: Abnormal   +mild arthritic changes     Range of motion: Normal     Stability: Abnormal  unsteady on feet  Muscle strength/tone: Abnormal   no focal weaknes-- generalized weakness  Assessment    1  Endometrial cancer (182 0) (C54 1)   2  Diabetes mellitus (250 00) (E11 9)   3  Gait disturbance (781 2) (R26 9)   4  Anxiety (300 00) (F41 9)    Plan  Anxiety    · DULoxetine HCl - 60 MG Oral Capsule Delayed Release Particles; take 1  capsule daily    Discussion/Summary  Surgical Clearance: She is at a MODERATE risk from a cardiovascular standpoint at this time without any additional cardiac testing  Reevaluation needed, if she should present with symptoms prior to surgery/procedure  Comments:  patient is cleared however, due to her significant chronic medical issues - she will need close followup and close post-op recovery care  labs and EKg were reviewed  Had slight increase of wbc's but will monitor for now and should not cancel surgery  Demonstrated use of insulin and injection procedure  instructed on medication usage prior to surgery  stop aspirin  other meds continue till day before surgery  cleared at moderate risk due to comorbid conditions  will increase duloxetine to 60 mg daily for better anxiety  cont bowel regimen for constipation  End of Encounter Meds    1  DULoxetine HCl - 60 MG Oral Capsule Delayed Release Particles; take 1 capsule daily; Therapy: 09LIY0744 to (Evaluate:54Csn2696)  Requested for: 34MVI3526; Last   Rx:11Jan2016 Ordered   2  LORazepam 0 5 MG Oral Tablet; 1 PO BID PRN; Therapy: 88CWH2577 to (Evaluate:22Apr2016); Last Rx:05Jjt3745 Ordered    3  HumaLOG Mix 75/25 KwikPen (75-25) 100 UNIT/ML Subcutaneous Suspension   Pen-injector; INJECT 40 UNIT Twice daily; Therapy: 24AKN1281 to (Whit Vanegas)  Requested for: 53YRN0831; Last   Rx:29Rix4422 Ordered    4  Diltiazem HCl  MG Oral Capsule Extended Release 24 Hour; TAKE 1 CAPSULE   DAILY; Therapy: 95WRL9752 to (Evaluate:24Apr2016)  Requested for: [phone]; Last   Rx:35Rkf0203 Ordered   5  Losartan Potassium 100 MG Oral Tablet; Take 1 tablet daily; Therapy: 19PDR2652 to (Evaluate:95Nkv1237)  Requested for: 14Wvo4583; Last   Rx:89Grz5481 Ordered    6  TraMADol HCl - 50 MG Oral Tablet; TAKE 1 TABLET EVERY 6 HOURS AS NEEDED FOR   PAIN;   Therapy: 90PPJ5853 to (Evaluate:26Nov2015); Last Rx:12Nov2015 Ordered    7  Allopurinol 300 MG Oral Tablet; TAKE 1 TABLET DAILY; Therapy: 61FEH1023 to (Evaluate:01Jun2016)  Requested for: 60LTO0860; Last   Rx:58Jml3106 Ordered    8  Fluticasone Propionate 50 MCG/ACT Nasal Suspension (Flonase); USE 2 SPRAYS IN   EACH NOSTRIL DAILY; Therapy: 93ZUB7499 to (Evaluate:12Jun2015)  Requested for: 62OYG8997; Last   Rx:84Lgj5378 Ordered    9  Aspirin 325 MG Oral Tablet; TAKE 1 TABLET DAILY; Therapy: 49DTB7031 to Recorded   10  Multi Vitamin/Minerals TABS; TAKE 1 TABLET DAILY;     Therapy: (Zoey Ordonez) to Recorded    Signatures   Electronically signed by : Jose Haines DO; Jan 11 2016  2:14PM EST                       (Author)

## 2018-01-13 NOTE — MISCELLANEOUS
History of Present Illness  TCM Communication [facility]: ARIANA CUELLAR  DERICK records were reviewed  She was hospitalized at [facility]  The date of admission: 1/15/2017, date of discharge: 1/21/2017, Transferred [facility] for cardiac catheterization on 1/16/17  Diagnosis: Acute Congestive Heart Failure / Diabetes / Gout / HX of Endometrial Cancer / Hypertension  She was discharged to a nursing home, Dis[facility] to [facility]  Unable to contact patient since she only has a home phone #  No answer on 1/23/17  Medications were not reviewed today  She did not schedule a follow up appointment  Follow-up appointments with other specialists: Patient has an appt on 1/26/17 with cardiologist, Dr Mago Oliva and was advised to schedule a followup appt with Dr Isabela Menendez for cognitive and functional evaluation  [place] [facility]  Communication performed and completed by Mariaelena Santiago RN      Active Problems    1  Alopecia (704 00) (L65 9)   2  Anxiety (300 00) (F41 9)   3  Bladder incontinence (788 30) (R32)   4  CAD (coronary artery disease) (414 00) (I25 10)   5  Claudication (443 9) (I73 9)   6  Constipation (564 00) (K59 00)   7  Diabetes mellitus (250 00) (E11 9)   8  Encounter for screening mammogram for breast cancer (V76 12) (Z12 31)   9  Endometrial cancer (182 0) (C54 1)   10  Foot pain (729 5) (M79 673)   11  Former smoker (K02 231)   15  Gait disturbance (781 2) (R26 9)   13  Hyperlipidemia (272 4) (E78 5)   14  Hypertension (401 9) (I10)   15  Meralgia paresthetica of right side (355 1) (G57 11)   16  Nausea (787 02) (R11 0)   17  NSTEMI, initial episode of care (410 71) (I21 4)   18  Osteoporosis (733 00) (M81 0)   19  Primary localized osteoarthritis of right knee (715 16) (M17 11)   20  Psoriasis (696 1) (L40 9)   21  Quadriceps weakness (728 87) (M62 81)   22  Screening for osteoporosis (V82 81) (Z13 820)   23   Spinal stenosis (724 00) (M48 00)   24  TIA (transient ischemic attack) (435 9) (G45 9)    Past Medical History    1  History of Acute knee pain (719 46) (M25 569)   2  History of Acute leg pain (729 5) (M79 606)   3  History of Acute urinary tract infection (599 0) (N39 0)   4  History of Bilateral numbness and tingling of arms and legs (782 0) (R20 2)   5  History of Flu vaccine need (V04 81) (Z23)   6  History of Flu vaccine need (V04 81) (Z23)   7  History of Foot pain (729 5) (M79 673)   8  History of Gait disturbance (781 2) (R26 9)   9  History of Gout (274 9) (M10 9)   10  History of  3   11  History of abdominal pain (V13 89) (Z87 898)   12  History of acute sinusitis (V12 69) (Z87 09)   13  History of allergic rhinitis (V12 69) (Z87 09)   14  History of dizziness (V13 89) (Z87 898)   15  History of headache (V13 89) (Z87 898)   16  History of hyperlipidemia (V12 29) (Z86 39)   17  History of nausea (V12 79) (Z87 898)   18  History of psoriatic arthritis (V13 4) (Z87 2)   19  History of sciatica (V12 49) (Z86 69)   20  History of stroke (V12 54) (Z86 73)   21  History of urinary frequency (V13 09) (Z87 898)   22  History of weakness (V13 89) (Z87 898)   23  History of Knee pain (719 46) (M25 569)   24  History of Lumbar radiculopathy (724 4) (M54 16)   25  History of Pain of lower extremity (729 5) (M79 606)   26  Personal history of arthritis (V13 4) (Z87 39)   27  History of Postmenopausal bleeding (627 1) (N95 0)   28  History of Screening for osteoporosis (V82 81) (Z13 820)   29  History of Urinary tract infection (599 0) (N39 0)   30  History of Vertigo following CVA (cerebrovascular accident) (099 17) (N81 019,O25)    Surgical History    1  History of Cholecystectomy Laparoscopic   2  History of Laparoscopy With Total Hysterectomy   3  History of Pelvic Examination Under Anesthesia   4  History of Salpingo-oophorectomy Bilateral   5  History of Tubal Ligation   6  History of Umbilical Hernia Repair   7  History of Uterine Myomectomy    Family History  Mother    1  Family history of cardiac disorder (V17 49) (Z82 49)   2  Family history of hypertension (V17 49) (Z82 49)  Father    3  Family history of diabetes mellitus (V18 0) (Z83 3)   4  Family history of hypertension (V17 49) (Z82 49)   5  Family history of malignant neoplasm (V16 9) (Z80 9)   6  Family history of malignant neoplasm of prostate (V16 42) (Z80 45)  Family History    7  Family history of arthritis (V17 7) (Z82 61)   8  Family history of osteoporosis (V17 81) (Z82 62)    Social History    · Former smoker (A11 151)   · No drug use   · Social alcohol use (Z78 9)    Current Meds   1  Allopurinol 300 MG Oral Tablet; TAKE 1 TABLET DAILY; Therapy: 45DNE0982 to (Evaluate:81Xnb4102)  Requested for: 80CVE5736; Last   Rx:96Jju9596 Ordered   2  Aspirin 325 MG Oral Tablet; TAKE 1 TABLET DAILY; Therapy: 63BRV6037 to Recorded   3  BD Insulin Syringe 30G X 1/2" 0 5 ML Miscellaneous; USE AS DIRECTED; Therapy: 59HTE2279 to (Last Rx:94Uyt4684)  Requested for: 85JCC0142 Ordered   4  DiltiaZEM HCl  MG Oral Capsule Extended Release 24 Hour; TAKE 1 CAPSULE   DAILY; Therapy: 96JKD9647 to (Evaluate:42Sbt5765)  Requested for: 06EXG1993; Last   Rx:39Hig6176 Ordered   5  DULoxetine HCl - 30 MG Oral Capsule Delayed Release Particles; take 1 capsule by   mouth once daily; Therapy: 42OGY0208 to (Evaluate:10Jan2017)  Requested for: 55KPQ8022; Last   Rx:85Fwa5594 Ordered   6  HumaLOG 100 UNIT/ML Subcutaneous Solution; INJECT 8 UNIT Before meals; Therapy: 36SRV5555 to (Evaluate:18Jan2017)  Requested for: 83KRB3302; Last   Rx:38Rey9488 Ordered   7  Levemir FlexTouch 100 UNIT/ML Subcutaneous Solution Pen-injector; INJECT 45 UNIT   Twice daily; Therapy: 90JXQ2663 to (Last Rx:72Mkm8724)  Requested for: 63Gai2072 Ordered   8  LORazepam 0 5 MG Oral Tablet; 1 PO BID PRN;    Therapy: 67OXZ2038 to (Evaluate:08Apr2017)  Requested for: 18OGZ0559; Last   Rx:32Ybg8798 Ordered   9  Losartan Potassium 100 MG Oral Tablet; Take 1 tablet daily; Therapy: 68OVQ1510 to (Evaluate:10Oct2016)  Requested for: 56Mpe4555; Last   Rx:63Pwr6271 Ordered   10  Multi Vitamin/Minerals TABS; TAKE 1 TABLET DAILY; Therapy: (Abelardo Zurita) to Recorded   11  TraMADol HCl - 50 MG Oral Tablet; TAKE 1 TABLET 3 times daily; Therapy: 11OJO4763 to (Evaluate:14Nov2016); Last Rx:51Cwo4817 Ordered    Allergies    1  Sulfa Drugs    Health Management  Diabetes mellitus   *VB - Eye Exam; every 1 year; Last 22KMR8405; Next Due: 13PPQ2853; Active  *VB - Foot Exam; every 1 year; Last 63LRC8420; Next Due: 07QOA7511; Active  Health Maintenance   Medicare Annual Wellness Visit; every 1 year; Next Due: 73Tqn3679; Overdue    Future Appointments    Date/Time Provider Specialty Site   01/26/2017 10:40 AM RAJESH Almaguer  Cardiology [facility]   01/30/2017 01:00 PM RAJESH Trejo   Endocrinology [facility] ENDOCRINOLOGY   02/16/2017 03:30 PM Bk Hinton DO Family Medicine [facility]   02/28/2017 01:00 PM Maribell Augustine MD Gynecological Oncology [facility] [place] [id number]     Signatures   Electronically signed by : Deepali Bustamante DO; Jan 23 2017  1:06PM EST                       (Author)

## 2018-01-14 VITALS
RESPIRATION RATE: 16 BRPM | SYSTOLIC BLOOD PRESSURE: 138 MMHG | BODY MASS INDEX: 35.21 KG/M2 | HEIGHT: 61 IN | HEART RATE: 76 BPM | WEIGHT: 186.5 LBS | DIASTOLIC BLOOD PRESSURE: 80 MMHG | TEMPERATURE: 97 F

## 2018-01-14 VITALS
HEART RATE: 76 BPM | DIASTOLIC BLOOD PRESSURE: 70 MMHG | SYSTOLIC BLOOD PRESSURE: 124 MMHG | BODY MASS INDEX: 35.5 KG/M2 | WEIGHT: 188 LBS | HEIGHT: 61 IN

## 2018-01-14 VITALS
SYSTOLIC BLOOD PRESSURE: 130 MMHG | HEART RATE: 72 BPM | DIASTOLIC BLOOD PRESSURE: 70 MMHG | SYSTOLIC BLOOD PRESSURE: 110 MMHG | DIASTOLIC BLOOD PRESSURE: 86 MMHG | HEART RATE: 104 BPM | HEIGHT: 61 IN | WEIGHT: 183.19 LBS | WEIGHT: 181 LBS | RESPIRATION RATE: 16 BRPM | BODY MASS INDEX: 34.17 KG/M2 | HEIGHT: 61 IN | BODY MASS INDEX: 34.58 KG/M2

## 2018-01-14 VITALS
SYSTOLIC BLOOD PRESSURE: 112 MMHG | DIASTOLIC BLOOD PRESSURE: 60 MMHG | BODY MASS INDEX: 33.61 KG/M2 | RESPIRATION RATE: 16 BRPM | WEIGHT: 178 LBS | HEART RATE: 92 BPM | HEIGHT: 61 IN

## 2018-01-14 VITALS — BODY MASS INDEX: 34.61 KG/M2 | WEIGHT: 183.2 LBS

## 2018-01-14 NOTE — MISCELLANEOUS
Message   Recorded as Task   Date: 11/11/2016 12:42 PM, Created By: Nayana Delgadillo   Task Name: Med Renewal Request   Assigned To: Nate Hinton   Regarding Patient: Alesia Dancer, Status: Active   CommentCaradair Prather - 11 Nov 2016 12:42 PM     TASK CREATED  Caller: Self; Renew Medication; [phone] (Home); [phone] (Work)  Nate-this can wait till tomorrow  Martin Stinson is requesting Duloxetine to [facility]  She hasnt taken it since August, because she thinks it dropped behind the dresser     Nate Hinton - 11 Nov 2016 7:52 PM     TASK EDITED                 script sent -- but since hasn't taken since august -- will restart at 30 mg  and can increase later        Plan  Anxiety    · DULoxetine HCl - 30 MG Oral Capsule Delayed Release Particles; take 1  capsule by mouth once daily    Signatures   Electronically signed by : Stevie Coello DO; Nov 11 2016  7:52PM EST                       (Author)

## 2018-01-14 NOTE — MISCELLANEOUS
Message  Message Free Text Note Form: I spoke with Becca with [facility] [address]  The patient was recently discharged from [facility] for rehab after a hospitalization for Uterine cancer-s/p robotic surgery  She was given a RX for Lantus and Humalog which she has not filled  She was previously on Humalog 75/25 mix  She has restarted the Humalog Mix she has at home and is currently taking 22 units twice a day  Her fasting blood sugar this morning was 197  Her p m  blood sugar last night was 222  I advised Becca to increase the Humalog 75/25 to 30 units BID  There will be a visiting nurse coming out again in the a m  and she will call and report with the patient's blood sugars  The patient does have a followup later on this week in the office  We will address her sugars at that time        Signatures   Electronically signed by : Yu Martin DO; Feb 8 2016  8:17AM EST                       (Author)

## 2018-01-14 NOTE — PROGRESS NOTES
History of Present Illness  Care Coordination Encounter Information:   Type of Encounter: Telephonic   Contact: Follow-Up    Spoke to Patient   Deion Bhat  Care Coordination  Nurse [facility]: I continue to follow Deion Bhat with her disease processes as her needs warrant close monitoring  She is following her medication regime as outlined with her diabetes management however she is still experiences hyperglycemia  I urged her once again to start a food journal and record all that she is consuming and she agreed  She was re-educated on the need to monitor her sodium consumption as she consumes a lot of canned items  I also gave suggestions for reducing her anxiety as this too is needed  I will reach out again in one week as we agreed on  Earlyne Hurt      Active Problems    1  Acute congestive heart failure (428 0) (I50 9)   2  Alopecia (704 00) (L65 9)   3  Anxiety (300 00) (F41 9)   4  Bladder incontinence (788 30) (R32)   5  CAD (coronary artery disease) (414 00) (I25 10)   6  Chronic diastolic CHF (congestive heart failure) (428 32,428 0) (I50 32)   7  Claudication (443 9) (I73 9)   8  Constipation (564 00) (K59 00)   9  Depression (311) (F32 9)   10  Diabetes mellitus (250 00) (E11 9)   11  Encounter for screening mammogram for breast cancer (V76 12) (Z12 31)   12  Endometrial cancer (182 0) (C54 1)   13  Foot pain (729 5) (M79 673)   14  Former smoker (Z87 891)   13  Gait disturbance (781 2) (R26 9)   16  Hyperlipidemia (272 4) (E78 5)   17  Hypertension (401 9) (I10)   18  Meralgia paresthetica of right side (355 1) (G57 11)   19  Nausea (787 02) (R11 0)   20  Need for pneumococcal vaccination (V03 82) (Z23)   21  NSTEMI, initial episode of care (410 71) (I21 4)   22  Osteoporosis (733 00) (M81 0)   23  Peripheral neuropathy (356 9) (G62 9)   24  Primary localized osteoarthritis of right knee (715 16) (M17 11)   25  Psoriasis (696 1) (L40 9)   26  Quadriceps weakness (728 87) (M62 81)   27   Screening for osteoporosis (V82 81) (Z13 820)   28  Screening mammogram, encounter for (V76 12) (Z12 31)   29  Spinal stenosis (724 00) (M48 00)   30  TIA (transient ischemic attack) (435 9) (G45 9)   31  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, with long-term    current use of insulin (250 52,V58 67) (E11 39,E11 65,Z79  4)    Past Medical History    1  History of Acute knee pain (719 46) (M25 569)   2  History of Acute leg pain (729 5) (M79 606)   3  History of Acute urinary tract infection (599 0) (N39 0)   4  History of Bilateral numbness and tingling of arms and legs (782 0) (R20 0,R20 2)   5  History of Flu vaccine need (V04 81) (Z23)   6  History of Flu vaccine need (V04 81) (Z23)   7  History of Foot pain (729 5) (M79 673)   8  History of Gait disturbance (781 2) (R26 9)   9  History of Gout (274 9) (M10 9)   10  History of  3   11  History of abdominal pain (V13 89) (Z87 898)   12  History of acute sinusitis (V12 69) (Z87 09)   13  History of allergic rhinitis (V12 69) (Z87 09)   14  History of dizziness (V13 89) (Z87 898)   15  History of headache (V13 89) (Z87 898)   16  History of hyperlipidemia (V12 29) (Z86 39)   17  History of nausea (V12 79) (Z87 898)   18  History of psoriatic arthritis (V13 4) (Z87 2)   19  History of sciatica (V12 49) (Z86 69)   20  History of stroke (V12 54) (Z86 73)   21  History of urinary frequency (V13 09) (Z87 898)   22  History of weakness (V13 89) (Z87 898)   23  History of Knee pain (719 46) (M25 569)   24  History of Lumbar radiculopathy (724 4) (M54 16)   25  History of Pain of lower extremity (729 5) (M79 606)   26  Personal history of arthritis (V13 4) (Z87 39)   27  History of Postmenopausal bleeding (627 1) (N95 0)   28  History of Screening for osteoporosis (V82 81) (Z13 820)   29  History of Urinary tract infection (599 0) (N39 0)   30  History of Vertigo following CVA (cerebrovascular accident) (705 31) (Q29 033,G92)    Surgical History    1   History of Cardiac Cath Lesion 1, 1st Adjunct Treat Device: Stent   2  History of Cholecystectomy Laparoscopic   3  History of Laparoscopy With Total Hysterectomy   4  History of Pelvic Examination Under Anesthesia   5  History of Salpingo-oophorectomy Bilateral   6  History of Tubal Ligation   7  History of Umbilical Hernia Repair   8  History of Uterine Myomectomy    Family History  Mother    1  Family history of cardiac disorder (V17 49) (Z82 49)   2  Family history of hypertension (V17 49) (Z82 49)  Father    3  Family history of diabetes mellitus (V18 0) (Z83 3)   4  Family history of hypertension (V17 49) (Z82 49)   5  Family history of malignant neoplasm (V16 9) (Z80 9)   6  Family history of malignant neoplasm of prostate (V16 42) (Z80 45)  Family History    7  Family history of arthritis (V17 7) (Z82 61)   8  Family history of osteoporosis (V17 81) (Z82 62)    Social History    · Former smoker (J52 255)   · No drug use   · Social alcohol use (Z78 9)    Current Meds    1  Furosemide 40 MG Oral Tablet (Lasix); TAKE 1 TABLET DAILY  Requested for:   41EDQ2018; Last Rx:02Mar2017 Ordered   2  Potassium Chloride ER 20 MEQ Oral Tablet Extended Release; One daily  Requested   for: 31SYQ2091; Last Rx:02Mar2017 Ordered    3  DULoxetine HCl - 60 MG Oral Capsule Delayed Release Particles; take 1 capsule daily; Therapy: 83HOW1754 to (Evaluate:08Bxd1673)  Requested for: 90TVL8048; Last   Rx:02Mar2017 Ordered   4  LORazepam 0 5 MG Oral Tablet; 1 PO BID PRN; Therapy: 30TCV8756 to (Evaluate:08Apr2017)  Requested for: 10JRA7131; Last   Rx:89Ldo5427 Ordered    5  Aspirin 325 MG Oral Tablet; TAKE 1 TABLET DAILY; Therapy: 87JSB4916 to (Evaluate:39Loo6577)  Requested for: 17Hmp3361; Last   Rx:05Apr2017 Ordered    6  Syringe 30 ML Miscellaneous; USE AS DIRECTED; Therapy: 47AAL8330 to (Last Rx:05Jun2017)  Requested for: 80DNV8581 Ordered    7   Atorvastatin Calcium 80 MG Oral Tablet (Lipitor); TAKE 1 TABLET AT BEDTIME    Requested for: 46LFL7892; Last Rx:02Mar2017; Status: ACTIVE - Renewal Denied   Ordered    8  Losartan Potassium 100 MG Oral Tablet; Take 1 tablet daily; Therapy: 92TCB0650 to (Evaluate:77Clf2278)  Requested for: 64KMP2441; Last   Rx:02Mar2017 Ordered   9  Metoprolol Succinate ER 50 MG Oral Tablet Extended Release 24 Hour; TAKE 1 TABLET   DAILY  Requested for: 48LSA2120; Last Rx:02Mar2017 Ordered    10  Clopidogrel Bisulfate 75 MG Oral Tablet (Plavix); TAKE 1 TABLET DAILY  Requested for:    80MUH2809; Last Rx:02Mar2017; Status: ACTIVE - Renewal Denied Ordered    11  Gabapentin 300 MG Oral Capsule; TAKE 1 CAPSULE TWICE DAILY; Therapy: 23IZE1502 to (Evaluate:09Jun2017)  Requested for: 02WIW3530; Last    Rx:09Feb2017 Ordered    12  Allopurinol 300 MG Oral Tablet; TAKE 1 TABLET DAILY; Therapy: 66JQW5839 to (Evaluate:20Zdc5534)  Requested for: 61BGP0067; Last    Rx:02Mar2017 Ordered    13  BD Pen Needle Jodi U/F 32G X 4 MM Miscellaneous; USE 4 TIMES A DAY; Therapy: 22QAG8893 to (Jj Comings)  Requested for: 46FYS5724; Last    Rx:24May2017 Ordered   14  HumaLOG KwikPen 100 UNIT/ML Subcutaneous Solution Pen-injector; 15 units before    meals; Therapy: 13RGF5030 to (Evaluate:22Hlk6861)  Requested for: 98OMK8891; Last    Rx:26May2017 Ordered   15  Lantus SoloStar 100 UNIT/ML Subcutaneous Solution Pen-injector; USE AS DIRECTED  50 units at bedtime; Therapy: 22Pxk8266 to (Evaluate:25Qcn9294)  Requested for: 72DFW4516; Last    Rx:23May2017 Ordered    16  Multi Vitamin/Minerals TABS; TAKE 1 TABLET DAILY; Therapy: (Recorded:90Rqg3675) to Recorded    Allergies    1  Sulfa Drugs    Health Management   *VB - Eye Exam; every 1 year; Last 31DYK8213; Next Due: 69DKQ1655; Active  *VB - Foot Exam; every 1 year; Last 35VTJ4635; Next Due: 11XSY5466; Active   Medicare Annual Wellness Visit; every 1 year; Next Due: 08Sep2015; Overdue    End of Encounter Meds    1   Furosemide 40 MG Oral Tablet (Lasix); TAKE 1 TABLET DAILY Requested for:   34PZU7073; Last Rx:02Mar2017 Ordered   2  Potassium Chloride ER 20 MEQ Oral Tablet Extended Release; One daily  Requested   for: 56PEO8850; Last Rx:02Mar2017 Ordered    3  DULoxetine HCl - 60 MG Oral Capsule Delayed Release Particles; take 1 capsule daily; Therapy: 09MKT5123 to (Evaluate:29Aug2017)  Requested for: 57GZC8540; Last   Rx:02Mar2017 Ordered   4  LORazepam 0 5 MG Oral Tablet; 1 PO BID PRN; Therapy: 08GWQ5575 to (Evaluate:08Apr2017)  Requested for: 26PKC6895; Last   Rx:67Lqv2807 Ordered    5  Aspirin 325 MG Oral Tablet; TAKE 1 TABLET DAILY; Therapy: 97VDO0390 to (Evaluate:03Aug2017)  Requested for: 05Apr2017; Last   Rx:05Apr2017 Ordered    6  Syringe 30 ML Miscellaneous; USE AS DIRECTED; Therapy: 04BLZ9062 to (Last Rx:05Jun2017)  Requested for: 51NRU9184 Ordered    7  Atorvastatin Calcium 80 MG Oral Tablet (Lipitor); TAKE 1 TABLET AT BEDTIME    Requested for: 59USH4866; Last Rx:02Mar2017; Status: ACTIVE - Renewal Denied   Ordered    8  Losartan Potassium 100 MG Oral Tablet; Take 1 tablet daily; Therapy: 02UTS4558 to (Evaluate:29Aug2017)  Requested for: 65KPJ6658; Last   Rx:02Mar2017 Ordered   9  Metoprolol Succinate ER 50 MG Oral Tablet Extended Release 24 Hour; TAKE 1 TABLET   DAILY  Requested for: 06YJT7516; Last Rx:02Mar2017 Ordered    10  Clopidogrel Bisulfate 75 MG Oral Tablet (Plavix); TAKE 1 TABLET DAILY  Requested for:    93BOF9210; Last Rx:02Mar2017; Status: ACTIVE - Renewal Denied Ordered    11  Gabapentin 300 MG Oral Capsule; TAKE 1 CAPSULE TWICE DAILY; Therapy: 80UKB3302 to (Evaluate:09Jun2017)  Requested for: 78VYI5162; Last    Rx:09Feb2017 Ordered    12  Allopurinol 300 MG Oral Tablet; TAKE 1 TABLET DAILY; Therapy: 63ARX8657 to (Evaluate:29Aug2017)  Requested for: 02NMC3202; Last    Rx:02Mar2017 Ordered    13  BD Pen Needle Jodi U/F 32G X 4 MM Miscellaneous; USE 4 TIMES A DAY;     Therapy: 95IZD5430 to (April Jones)  Requested for: 86EJS9958; Last AC:04GAJ7217 Ordered   14  HumaLOG KwikPen 100 UNIT/ML Subcutaneous Solution Pen-injector; 15 units before    meals; Therapy: 17PTA6031 to (Evaluate:75Eca3143)  Requested for: 56OFB2545; Last    Rx:46Lxw9029 Ordered   15  Lantus SoloStar 100 UNIT/ML Subcutaneous Solution Pen-injector; USE AS DIRECTED  50 units at bedtime; Therapy: 76Heo3271 to (Evaluate:28Cbr5596)  Requested for: 28OBU1865; Last    Rx:39Vto5562 Ordered    16  Multi Vitamin/Minerals TABS; TAKE 1 TABLET DAILY; Therapy: (Recorded:94Dxy8173) to Recorded    Future Appointments    Date/Time Provider Specialty Site   06/19/2017 02:40 PM Julious Scheuermann, M D  Cardiology [facility]   06/30/2017 02:00 PM Olive Coon RD Diabetes Educator [facility] ENDOCRINOLOGY   08/18/2017 01:15 PM Nila Hinton DO Family Medicine [facility]   07/06/2017 03:15 PM Moi Fisher, Rickey Casia  Endocrinology [facility] ENDOCRINOLOGY   08/03/2017 01:15 PM Yumiko Day MD Gynecological Oncology [facility] [address] [place] [address]     Patient Care Team    Care Team Member Role Specialty Office Number   Waldemar Vargas MD Referring Orthopedic Surgery [phone]   [place]  Specialist Neurosurgery [phone]   Loc Hernandez MD Specialist Gynecological Oncology [phone]   Ziyad Bird MD Specialist Radiation Oncology [phone]   [place]    Endocrinology [phone]     Signatures   Electronically signed by : Nba Vear RN; Jun 14 2017  1:23PM EST                       (Author)

## 2018-01-15 NOTE — PROGRESS NOTES
History of Present Illness  Care Coordination Encounter Information:   Type of Encounter: Telephonic    Spoke to Patient   Nicanor Arcos  Care Coordination  Nurse [facility]:   The reason for call is to discuss outreach for follow up/needed services  I reached out to Nicanor Arcos in reference to her chronic conditions and she was very agreeable to converse with me  We discussed her new medication regime for which she has not received her new insulin pens so I reminded her to call the pharmacy to see if they are there  She verbalized a diet high in fats/carbohydrates and we worked on better options  I reminded her to write her glucose levels down so that she can send them to her endo as she desired  I will call her again in a week  JG      Active Problems    1  Acute congestive heart failure (428 0) (I50 9)   2  Alopecia (704 00) (L65 9)   3  Anxiety (300 00) (F41 9)   4  Bladder incontinence (788 30) (R32)   5  CAD (coronary artery disease) (414 00) (I25 10)   6  Chronic diastolic CHF (congestive heart failure) (428 32,428 0) (I50 32)   7  Claudication (443 9) (I73 9)   8  Constipation (564 00) (K59 00)   9  Depression (311) (F32 9)   10  Diabetes mellitus (250 00) (E11 9)   11  Encounter for screening mammogram for breast cancer (V76 12) (Z12 31)   12  Endometrial cancer (182 0) (C54 1)   13  Foot pain (729 5) (M79 673)   14  Former smoker (Z87 891)   13  Gait disturbance (781 2) (R26 9)   16  Hyperlipidemia (272 4) (E78 5)   17  Hypertension (401 9) (I10)   18  Meralgia paresthetica of right side (355 1) (G57 11)   19  Nausea (787 02) (R11 0)   20  Need for pneumococcal vaccination (V03 82) (Z23)   21  NSTEMI, initial episode of care (410 71) (I21 4)   22  Osteoporosis (733 00) (M81 0)   23  Peripheral neuropathy (356 9) (G62 9)   24  Primary localized osteoarthritis of right knee (715 16) (M17 11)   25  Psoriasis (696 1) (L40 9)   26  Quadriceps weakness (728 87) (M62 81)   27   Screening for osteoporosis (Z51 81) (Z13 820)   28  Screening mammogram, encounter for (V76 12) (Z12 31)   29  Spinal stenosis (724 00) (M48 00)   30  TIA (transient ischemic attack) (435 9) (G45 9)   31  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, with long-term    current use of insulin (250 52,V58 67) (E11 39,E11 65,Z79  4)    Past Medical History    1  History of Acute knee pain (719 46) (M25 569)   2  History of Acute leg pain (729 5) (M79 606)   3  History of Acute urinary tract infection (599 0) (N39 0)   4  History of Bilateral numbness and tingling of arms and legs (782 0) (R20 0,R20 2)   5  History of Flu vaccine need (V04 81) (Z23)   6  History of Flu vaccine need (V04 81) (Z23)   7  History of Foot pain (729 5) (M79 673)   8  History of Gait disturbance (781 2) (R26 9)   9  History of Gout (274 9) (M10 9)   10  History of  3   11  History of abdominal pain (V13 89) (Z87 898)   12  History of acute sinusitis (V12 69) (Z87 09)   13  History of allergic rhinitis (V12 69) (Z87 09)   14  History of dizziness (V13 89) (Z87 898)   15  History of headache (V13 89) (Z87 898)   16  History of hyperlipidemia (V12 29) (Z86 39)   17  History of nausea (V12 79) (Z87 898)   18  History of psoriatic arthritis (V13 4) (Z87 2)   19  History of sciatica (V12 49) (Z86 69)   20  History of stroke (V12 54) (Z86 73)   21  History of urinary frequency (V13 09) (Z87 898)   22  History of weakness (V13 89) (Z87 898)   23  History of Knee pain (719 46) (M25 569)   24  History of Lumbar radiculopathy (724 4) (M54 16)   25  History of Pain of lower extremity (729 5) (M79 606)   26  Personal history of arthritis (V13 4) (Z87 39)   27  History of Postmenopausal bleeding (627 1) (N95 0)   28  History of Screening for osteoporosis (V82 81) (Z13 820)   29  History of Urinary tract infection (599 0) (N39 0)   30  History of Vertigo following CVA (cerebrovascular accident) (568 47) (C91 945,J42)    Surgical History    1   History of Cardiac Cath Lesion 1, 1st Adjunct Treat Device: Stent   2  History of Cholecystectomy Laparoscopic   3  History of Laparoscopy With Total Hysterectomy   4  History of Pelvic Examination Under Anesthesia   5  History of Salpingo-oophorectomy Bilateral   6  History of Tubal Ligation   7  History of Umbilical Hernia Repair   8  History of Uterine Myomectomy    Family History  Mother    1  Family history of cardiac disorder (V17 49) (Z82 49)   2  Family history of hypertension (V17 49) (Z82 49)  Father    3  Family history of diabetes mellitus (V18 0) (Z83 3)   4  Family history of hypertension (V17 49) (Z82 49)   5  Family history of malignant neoplasm (V16 9) (Z80 9)   6  Family history of malignant neoplasm of prostate (V16 42) (Z80 45)  Family History    7  Family history of arthritis (V17 7) (Z82 61)   8  Family history of osteoporosis (V17 81) (Z82 62)    Social History    · Former smoker (Y98 622)   · No drug use   · Social alcohol use (Z78 9)    Current Meds    1  Furosemide 40 MG Oral Tablet (Lasix); TAKE 1 TABLET DAILY  Requested for:   13LEV4004; Last Rx:02Mar2017 Ordered   2  Potassium Chloride ER 20 MEQ Oral Tablet Extended Release; One daily  Requested   for: 83KDE9391; Last Rx:02Mar2017 Ordered    3  DULoxetine HCl - 60 MG Oral Capsule Delayed Release Particles; take 1 capsule daily; Therapy: 69GGJ6059 to (Evaluate:51Cgh9634)  Requested for: 27VFW1764; Last   Rx:02Mar2017 Ordered   4  LORazepam 0 5 MG Oral Tablet; 1 PO BID PRN; Therapy: 17DTX5417 to (Evaluate:08Apr2017)  Requested for: 76MNA9431; Last   Rx:26Pha9273 Ordered    5  Aspirin 325 MG Oral Tablet; TAKE 1 TABLET DAILY; Therapy: 57KLC2504 to (Evaluate:38Swq5359)  Requested for: 14Qjb6863; Last   Rx:05Apr2017 Ordered    6  Syringe 30 ML Miscellaneous; USE AS DIRECTED; Therapy: 79NVW5326 to (Last Rx:05Jun2017)  Requested for: 18IJJ6340 Ordered    7   Atorvastatin Calcium 80 MG Oral Tablet (Lipitor); TAKE 1 TABLET AT BEDTIME    Requested for: 25OXG0978; Last Rx:02Mar2017; Status: ACTIVE - Renewal Denied   Ordered    8  Losartan Potassium 100 MG Oral Tablet; Take 1 tablet daily; Therapy: 93YNA7345 to (Evaluate:73Gth7133)  Requested for: 64QWQ7126; Last   Rx:02Mar2017 Ordered   9  Metoprolol Succinate ER 50 MG Oral Tablet Extended Release 24 Hour; TAKE 1 TABLET   DAILY  Requested for: 48VTP8216; Last Rx:02Mar2017 Ordered    10  Clopidogrel Bisulfate 75 MG Oral Tablet (Plavix); TAKE 1 TABLET DAILY  Requested for:    78GOS6125; Last Rx:02Mar2017; Status: ACTIVE - Renewal Denied Ordered    11  Gabapentin 300 MG Oral Capsule; TAKE 1 CAPSULE TWICE DAILY; Therapy: 58IWT4644 to (Evaluate:09Jun2017)  Requested for: 68BSR0751; Last    Rx:09Feb2017 Ordered    12  Allopurinol 300 MG Oral Tablet; TAKE 1 TABLET DAILY; Therapy: 99KKI7524 to (Evaluate:40Xqs6914)  Requested for: 38SCB6662; Last    Rx:02Mar2017 Ordered    13  BD Pen Needle Jodi U/F 32G X 4 MM Miscellaneous; USE 4 TIMES A DAY; Therapy: 57CZE6755 to (Ryder Egan)  Requested for: 97GPU4804; Last    Rx:24May2017 Ordered   14  HumaLOG KwikPen 100 UNIT/ML Subcutaneous Solution Pen-injector; 15 units before    meals; Therapy: 88PUA3460 to (Evaluate:03Myk9755)  Requested for: 97BZZ6776; Last    Rx:26May2017 Ordered   15  Lantus SoloStar 100 UNIT/ML Subcutaneous Solution Pen-injector; USE AS DIRECTED  50 units at bedtime; Therapy: 15Otr1500 to (Evaluate:79Jxy6203)  Requested for: 73TNN9061; Last    Rx:23May2017 Ordered    16  Multi Vitamin/Minerals TABS; TAKE 1 TABLET DAILY; Therapy: (Recorded:31Awt6648) to Recorded    Allergies    1  Sulfa Drugs    Health Management   *VB - Eye Exam; every 1 year; Last 72QDY2471; Next Due: 05PSZ3573; Active  *VB - Foot Exam; every 1 year; Last 58HME3885; Next Due: 52NWB2090; Active   Medicare Annual Wellness Visit; every 1 year; Next Due: 08Sep2015; Overdue    End of Encounter Meds    1   Furosemide 40 MG Oral Tablet (Lasix); TAKE 1 TABLET DAILY  Requested for: 40LWK2451; Last Rx:02Mar2017 Ordered   2  Potassium Chloride ER 20 MEQ Oral Tablet Extended Release; One daily  Requested   for: 96OKN3310; Last Rx:02Mar2017 Ordered    3  DULoxetine HCl - 60 MG Oral Capsule Delayed Release Particles; take 1 capsule daily; Therapy: 42TFC5159 to (Evaluate:29Aug2017)  Requested for: 73JBL1348; Last   Rx:02Mar2017 Ordered   4  LORazepam 0 5 MG Oral Tablet; 1 PO BID PRN; Therapy: 10TZG3131 to (Evaluate:08Apr2017)  Requested for: 34GMY4894; Last   Rx:47Cfb6568 Ordered    5  Aspirin 325 MG Oral Tablet; TAKE 1 TABLET DAILY; Therapy: 73ZTX8928 to (Evaluate:03Aug2017)  Requested for: 05Apr2017; Last   Rx:05Apr2017 Ordered    6  Syringe 30 ML Miscellaneous; USE AS DIRECTED; Therapy: 24CIY5904 to (Last Rx:05Jun2017)  Requested for: 63XUB0151 Ordered    7  Atorvastatin Calcium 80 MG Oral Tablet (Lipitor); TAKE 1 TABLET AT BEDTIME    Requested for: 61YKS6478; Last Rx:02Mar2017; Status: ACTIVE - Renewal Denied   Ordered    8  Losartan Potassium 100 MG Oral Tablet; Take 1 tablet daily; Therapy: 94JWP1430 to (Evaluate:29Aug2017)  Requested for: 44KWM9561; Last   Rx:02Mar2017 Ordered   9  Metoprolol Succinate ER 50 MG Oral Tablet Extended Release 24 Hour; TAKE 1 TABLET   DAILY  Requested for: 28FRK0850; Last Rx:02Mar2017 Ordered    10  Clopidogrel Bisulfate 75 MG Oral Tablet (Plavix); TAKE 1 TABLET DAILY  Requested for:    52EUE4475; Last Rx:02Mar2017; Status: ACTIVE - Renewal Denied Ordered    11  Gabapentin 300 MG Oral Capsule; TAKE 1 CAPSULE TWICE DAILY; Therapy: 98DSD3004 to (Evaluate:09Jun2017)  Requested for: 26IDL6945; Last    Rx:09Feb2017 Ordered    12  Allopurinol 300 MG Oral Tablet; TAKE 1 TABLET DAILY; Therapy: 59NAW3582 to (Evaluate:29Aug2017)  Requested for: 29VRO6199; Last    Rx:02Mar2017 Ordered    13  BD Pen Needle Jodi U/F 32G X 4 MM Miscellaneous; USE 4 TIMES A DAY;     Therapy: 09KOV3813 to (Herb Turner)  Requested for: 29QWS4670; Last    OP:14BVE5225 Ordered   14  HumaLOG KwikPen 100 UNIT/ML Subcutaneous Solution Pen-injector; 15 units before    meals; Therapy: 92IFN9634 to (Evaluate:25Pvi3472)  Requested for: 78ZKJ5810; Last    Rx:49Qho8012 Ordered   15  Lantus SoloStar 100 UNIT/ML Subcutaneous Solution Pen-injector; USE AS DIRECTED  50 units at bedtime; Therapy: 58Mir3757 to (Evaluate:36Qtt6342)  Requested for: 51ECU2811; Last    Rx:26Yde6769 Ordered    16  Multi Vitamin/Minerals TABS; TAKE 1 TABLET DAILY; Therapy: (Recorded:01Gae2727) to Recorded    Future Appointments    Date/Time Provider Specialty Site   06/19/2017 02:40 PM RAJESH Gallo  [facility]   06/30/2017 02:00 PM Claire Cadet RD Diabetes Educator [facility] ENDOCRINOLOGY   08/18/2017 01:15 PM Selin Hinton DO Family [facility]   07/06/2017 03:15 PM Pineda Stephens, Rickey Casia  Endocrinology [facility] ENDOCRINOLOGY   06/13/2017 01:00 PM Trey Kee MD Gynecological Oncology [address] [place] [address]     Patient Care Team    Care Team Member Role Specialty Office Number   Andressa Lorenzana MD Referring Orthopedic Surgery [phone]   Carm[place] GA  Specialist Neurosurgery [phone]   Trey Kee MD Specialist Gynecological Oncology [phone]   Torie Chau MD Specialist Radiation Oncology [phone]   [place]    Endocrinology [phone]     Signatures   Electronically signed by : Randall Jefferson RN; Jun 5 2017  2:53PM EST                       (Author)

## 2018-01-15 NOTE — MISCELLANEOUS
History of Present Illness  TCM Communication [facility]: ARIANA CUELLAR [facility] records were reviewed  She was hospitalized at [facility]  The date of admission: 1/12/2017, date of discharge: 1/15/2017  Diagnosis: Chest Pain / NSTEMI / Community-Acquired Pneumonia / Hypoxia / Type 2 Diabetes Mellitus without complication / Gout / HX of Endometrial Cancer  She was discharged and was transferred to [facility] on 1/15/2017 for cardiac catheterization on 1/16/2017  Communication performed and completed by Archibald Carrel RN      Active Problems    1  Alopecia (704 00) (L65 9)   2  Anxiety (300 00) (F41 9)   3  Bladder incontinence (788 30) (R32)   4  Claudication (443 9) (I73 9)   5  Constipation (564 00) (K59 00)   6  Diabetes mellitus (250 00) (E11 9)   7  Encounter for screening mammogram for breast cancer (V76 12) (Z12 31)   8  Endometrial cancer (182 0) (C54 1)   9  Foot pain (729 5) (M79 673)   10  Former smoker (Z87 891)   6  Gait disturbance (781 2) (R26 9)   12  Hyperlipidemia (272 4) (E78 5)   13  Hypertension (401 9) (I10)   14  Meralgia paresthetica of right side (355 1) (G57 11)   15  Nausea (787 02) (R11 0)   16  Osteoporosis (733 00) (M81 0)   17  Primary localized osteoarthritis of right knee (715 16) (M17 11)   18  Psoriasis (696 1) (L40 9)   19  Quadriceps weakness (728 87) (M62 81)   20  Screening for osteoporosis (V82 81) (Z13 820)   21  Spinal stenosis (724 00) (M48 00)   22  TIA (transient ischemic attack) (435 9) (G45 9)    Past Medical History    1  History of Acute knee pain (719 46) (M25 569)   2  History of Acute leg pain (729 5) (M79 606)   3  History of Acute urinary tract infection (599 0) (N39 0)   4  History of Bilateral numbness and tingling of arms and legs (782 0) (R20 2)   5  History of Flu vaccine need (V04 81) (Z23)   6  History of Flu vaccine need (V04 81) (Z23)   7  History of Foot pain (729 5) (M79 673)   8  History of Gait disturbance (781 2) (R26 9)   9  History of Gout (274 9) (M10 9)   10  History of  3   11  History of abdominal pain (V13 89) (Z87 898)   12  History of acute sinusitis (V12 69) (Z87 09)   13  History of allergic rhinitis (V12 69) (Z87 09)   14  History of dizziness (V13 89) (Z87 898)   15  History of headache (V13 89) (Z87 898)   16  History of hyperlipidemia (V12 29) (Z86 39)   17  History of nausea (V12 79) (Z87 898)   18  History of psoriatic arthritis (V13 4) (Z87 2)   19  History of sciatica (V12 49) (Z86 69)   20  History of stroke (V12 54) (Z86 73)   21  History of urinary frequency (V13 09) (Z87 898)   22  History of weakness (V13 89) (Z87 898)   23  History of Knee pain (719 46) (M25 569)   24  History of Lumbar radiculopathy (724 4) (M54 16)   25  History of Pain of lower extremity (729 5) (M79 606)   26  Personal history of arthritis (V13 4) (Z87 39)   27  History of Postmenopausal bleeding (627 1) (N95 0)   28  History of Screening for osteoporosis (V82 81) (Z13 820)   29  History of Urinary tract infection (599 0) (N39 0)   30  History of Vertigo following CVA (cerebrovascular accident) (215 67) (Y09 317,V30)    Surgical History    1  History of Cholecystectomy Laparoscopic   2  History of Laparoscopy With Total Hysterectomy   3  History of Pelvic Examination Under Anesthesia   4  History of Salpingo-oophorectomy Bilateral   5  History of Tubal Ligation   6  History of Umbilical Hernia Repair   7  History of Uterine Myomectomy    Family History  Mother    1  Family history of cardiac disorder (V17 49) (Z82 49)   2  Family history of hypertension (V17 49) (Z82 49)  Father    3  Family history of diabetes mellitus (V18 0) (Z83 3)   4  Family history of hypertension (V17 49) (Z82 49)   5  Family history of malignant neoplasm (V16 9) (Z80 9)   6  Family history of malignant neoplasm of prostate (V16 42) (Z80 45)  Family History    7  Family history of arthritis (V17 7) (Z82 61)   8   Family history of osteoporosis (V17 81) (Z82 62)    Social History    · Former smoker (Z23 446)   · No drug use   · Social alcohol use (Z78 9)    Current Meds   1  Allopurinol 300 MG Oral Tablet; TAKE 1 TABLET DAILY; Therapy: 94SBX7290 to (Evaluate:62Kfr5522)  Requested for: 16UTQ8549; Last   Rx:14Nov2016 Ordered   2  Aspirin 325 MG Oral Tablet; TAKE 1 TABLET DAILY; Therapy: 17MSN5601 to Recorded   3  BD Insulin Syringe 30G X 1/2" 0 5 ML Miscellaneous; USE AS DIRECTED; Therapy: 30RRR8633 to (Last Rx:26Axb0266)  Requested for: 37FQU4403 Ordered   4  DiltiaZEM HCl  MG Oral Capsule Extended Release 24 Hour; TAKE 1 CAPSULE   DAILY; Therapy: 72YJJ0276 to (Evaluate:17Apr2017)  Requested for: 18OFX9847; Last   Rx:19Oct2016 Ordered   5  DULoxetine HCl - 30 MG Oral Capsule Delayed Release Particles; take 1 capsule by   mouth once daily; Therapy: 95HZQ7730 to (Evaluate:10Jan2017)  Requested for: 80IVC9040; Last   Rx:11Nov2016 Ordered   6  HumaLOG 100 UNIT/ML Subcutaneous Solution; INJECT 8 UNIT Before meals; Therapy: 46LAN8086 to (Evaluate:18Jan2017)  Requested for: 81LXQ6175; Last   Rx:66Xme3483 Ordered   7  Levemir FlexTouch 100 UNIT/ML Subcutaneous Solution Pen-injector; INJECT 45 UNIT   Twice daily; Therapy: 94GGG7907 to (Last Rx:04Xpa0428)  Requested for: 78Rnq8424 Ordered   8  LORazepam 0 5 MG Oral Tablet; 1 PO BID PRN; Therapy: 46OSU6882 to (Evaluate:08Apr2017)  Requested for: 48NVM7705; Last   Rx:55Qzf7668 Ordered   9  Losartan Potassium 100 MG Oral Tablet; Take 1 tablet daily; Therapy: 78STJ1668 to (Evaluate:10Oct2016)  Requested for: 46Sqg5174; Last   Rx:61Nqr1445 Ordered   10  Multi Vitamin/Minerals TABS; TAKE 1 TABLET DAILY; Therapy: (Tara Maribel) to Recorded   11  TraMADol HCl - 50 MG Oral Tablet; TAKE 1 TABLET 3 times daily; Therapy: 96EFT5953 to (Evaluate:14Nov2016); Last Rx:25Oct2016 Ordered    Allergies    1  Sulfa Drugs    Health Management  Diabetes mellitus   *VB - Eye Exam; every 1 year;  Last 26JNG2299; Next Due: 41ZTJ2266; Active  *VB - Foot Exam; every 1 year; Last 24KVE4811; Next Due: 58NPE5802; Active  Health Maintenance   Medicare Annual Wellness Visit; every 1 year; Next Due: 54Zos0284; Overdue    Future Appointments    Date/Time Provider Specialty Site   01/26/2017 10:40 AM RAJESH Kenney  Cardiology [facility] CARDIOLOGY [place]   01/30/2017 01:00 PM RAJESH Ibanez   Endocrinology [facility] ENDOCRINOLOGY   02/16/2017 03:30 PM Page, Fran Carrel, DO Family Medicine [facility]   02/28/2017 01:00 PM Emil Yeager MD Gynecological Oncology [facility] [place]     Signatures   Electronically signed by : Fracisco Paez, ; Jan 16 2017  5:54PM EST                       (Author)    Electronically signed by : Manuel Gray DO; Jan 18 2017  5:04PM EST                       (Author)

## 2018-01-16 ENCOUNTER — ALLSCRIPTS OFFICE VISIT (OUTPATIENT)
Dept: OTHER | Facility: OTHER | Age: 73
End: 2018-01-16

## 2018-01-16 NOTE — PROGRESS NOTES
History of Present Illness  Care Coordination  Nurse [facility]: Winter Szymanski met with me following her PCP appointment to have a brief 1:1 meeting  We went over a few things but this was just a chance to familiarize ourselves in order to better the outcome  She is feeling well and will be moving to a new facility very soon and is very positive about her future  We agreed to reach out again in a few weeks  JG      Active Problems    1  Acute congestive heart failure (428 0) (I50 9)   2  Alopecia (704 00) (L65 9)   3  Anxiety (300 00) (F41 9)   4  Bladder incontinence (788 30) (R32)   5  CAD (coronary artery disease) (414 00) (I25 10)   6  Chronic diastolic CHF (congestive heart failure) (428 32,428 0) (I50 32)   7  Claudication (443 9) (I73 9)   8  Constipation (564 00) (K59 00)   9  Depression (311) (F32 9)   10  Diabetes mellitus (250 00) (E11 9)   11  Endometrial cancer (182 0) (C54 1)   12  Foot pain (729 5) (M79 673)   13  Former smoker (Z87 891)   15  Gait disturbance (781 2) (R26 9)   15  Hyperlipidemia (272 4) (E78 5)   16  Hypertension (401 9) (I10)   17  Meralgia paresthetica of right side (355 1) (G57 11)   18  Nausea (787 02) (R11 0)   19  NSTEMI, initial episode of care (410 71) (I21 4)   20  Osteoporosis (733 00) (M81 0)   21  Peripheral neuropathy (356 9) (G62 9)   22  Primary localized osteoarthritis of right knee (715 16) (M17 11)   23  Psoriasis (696 1) (L40 9)   24  Quadriceps weakness (728 87) (M62 81)   25  Spinal stenosis (724 00) (M48 00)   26  TIA (transient ischemic attack) (435 9) (G45 9)   27  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, with long-term    current use of insulin (250 52,V58 67) (E11 39,E11 65,Z79  4)    Past Medical History    1  History of Acute knee pain (719 46) (M25 569)   2  History of Acute leg pain (729 5) (M79 606)   3  History of Acute urinary tract infection (599 0) (N39 0)   4   History of Bilateral numbness and tingling of arms and legs (782 0) (R20 0,R20 2)   5  History of Foot pain (729 5) (M79 673)   6  History of Gait disturbance (781 2) (R26 9)   7  History of Gout (274 9) (M10 9)   8  History of  3   9  History of abdominal pain (V13 89) (Z87 898)   10  History of acute sinusitis (V12 69) (Z87 09)   11  History of allergic rhinitis (V12 69) (Z87 09)   12  History of dizziness (V13 89) (Z87 898)   13  History of headache (V13 89) (Z87 898)   14  History of hyperlipidemia (V12 29) (Z86 39)   15  History of nausea (V12 79) (Z87 898)   16  History of psoriatic arthritis (V13 4) (Z87 2)   17  History of sciatica (V12 49) (Z86 69)   18  History of stroke (V12 54) (Z86 73)   19  History of urinary frequency (V13 09) (Z87 898)   20  History of weakness (V13 89) (Z87 898)   21  History of Knee pain (719 46) (M25 569)   22  History of Lumbar radiculopathy (724 4) (M54 16)   23  History of Pain of lower extremity (729 5) (M79 606)   24  Personal history of arthritis (V13 4) (Z87 39)   25  History of Postmenopausal bleeding (627 1) (N95 0)   26  History of Screening for osteoporosis (V82 81) (Z13 820)   27  History of Urinary tract infection (599 0) (N39 0)   28  History of Vertigo following CVA (cerebrovascular accident) (847 74) (D24 008,I13)    Surgical History    1  History of Cardiac Cath Lesion 1, 1st Adjunct Treat Device: Stent   2  History of Cholecystectomy Laparoscopic   3  History of Laparoscopy With Total Hysterectomy   4  History of Pelvic Examination Under Anesthesia   5  History of Salpingo-oophorectomy Bilateral   6  History of Tubal Ligation   7  History of Umbilical Hernia Repair   8  History of Uterine Myomectomy    Family History  Mother    1  Family history of cardiac disorder (V17 49) (Z82 49)   2  Family history of hypertension (V17 49) (Z82 49)  Father    3  Family history of diabetes mellitus (V18 0) (Z83 3)   4  Family history of hypertension (V17 49) (Z82 49)   5  Family history of malignant neoplasm (V16 9) (Z80 9)   6   Family history of malignant neoplasm of prostate (V16 42) (Z80 42)  Family History    7  Family history of arthritis (V17 7) (Z82 61)   8  Family history of osteoporosis (V17 81) (Z82 62)    Social History    · Former smoker (W36 011)   · No drug use   · Social alcohol use (Z78 9)    Current Meds    1  Furosemide 40 MG Oral Tablet (Lasix); TAKE 1 TABLET DAILY  Requested for:   43Gjx6487; Last Rx:86Lfq0823 Ordered   2  Potassium Chloride ER 20 MEQ Oral Tablet Extended Release; One daily  Requested   for: 15Mic3753; Last Rx:97Mob4049 Ordered    3  DULoxetine HCl - 60 MG Oral Capsule Delayed Release Particles; take 1 capsule daily; Therapy: 12WLY1034 to (Evaluate:22Jan2018)  Requested for: 48Ldm7410; Last   Rx:55Yyw9750 Ordered   4  LORazepam 0 5 MG Oral Tablet; 1 PO BID PRN; Therapy: 62KAS2097 to (Suellyn Frankel)  Requested for: 70HDP7179; Last   Rx:47Vug3291 Ordered    5  Syringe 30 ML Miscellaneous; USE AS DIRECTED; Therapy: 01XOW4713 to (Last Rx:05Jun2017)  Requested for: 67SQU4899 Ordered    6  Aspirin 81 MG Oral Tablet Delayed Release; take 1 tablet by mouth daily; Therapy: 65HSI8916 to (Evaluate:17Sep2017); Last Rx:19Jun2017 Ordered   7  Atorvastatin Calcium 80 MG Oral Tablet (Lipitor); TAKE 1 TABLET AT BEDTIME    Requested for: 09Kxl4145; Last Rx:25Qph5133 Ordered    8  Losartan Potassium 100 MG Oral Tablet; Take 1 tablet daily; Therapy: 30YMA9283 to (Evaluate:22Jan2018)  Requested for: 44Dkv2566; Last   Rx:56Ejd2915 Ordered   9  Metoprolol Succinate ER 50 MG Oral Tablet Extended Release 24 Hour; TAKE 1 TABLET   DAILY  Requested for: 64Glv9929; Last Rx:53Pdx2235 Ordered    10  Clopidogrel Bisulfate 75 MG Oral Tablet (Plavix); TAKE 1 TABLET DAILY  Requested for:    74Gsn4191; Last Rx:98Opl2297 Ordered    11  Gabapentin 300 MG Oral Capsule; TAKE 1 CAPSULE TWICE DAILY; Therapy: 38HJO5529 to (Evaluate:22Jan2018)  Requested for: 19Bin1607; Last    Rx:09Ksc6943 Ordered    12   Allopurinol 300 MG Oral Tablet; TAKE 1 TABLET DAILY; Therapy: 61RSU3125 to (Evaluate:22Jan2018)  Requested for: 23Avi2092; Last    Rx:79Kjf2621 Ordered    13  BD Pen Needle Jodi U/F 32G X 4 MM Miscellaneous; USE 4 TIMES A DAY; Therapy: 74LAA3544 to (Delsowmya Greener)  Requested for: 79ROS4856; Last    Rx:04Jqu9079 Ordered   14  HumaLOG KwikPen 100 UNIT/ML Subcutaneous Solution Pen-injector; 15 units before    meals; Therapy: 19RDZ7824 to (Algis Spittle)  Requested for: 16LDD3760; Last    Rx:95Xyv2389 Ordered   15  Lantus SoloStar 100 UNIT/ML Subcutaneous Solution Pen-injector; USE AS DIRECTED  50 units at bedtime; Therapy: 24Fxh1630 to (Algis Spittle)  Requested for: 69UAW5023; Last    Rx:70Prc1465 Ordered    16  Multi Vitamin/Minerals TABS; TAKE 1 TABLET DAILY; Therapy: (Recorded:60Qjq8369) to Recorded    Allergies    1  Sulfa Drugs    Health Management   *VB - Eye Exam; every 1 year; Last 04GXC5850; Next Due: 76NPZ2081; Active  *VB - Foot Exam; every 1 year; Last 42TJZ4686; Next Due: 16CRG3729; Near Due   Medicare Annual Wellness Visit; every 1 year; Next Due: 44Ngh5003; Overdue    End of Encounter Meds    1  Furosemide 40 MG Oral Tablet (Lasix); TAKE 1 TABLET DAILY  Requested for:   72Lhg5215; Last Rx:99Alu5760 Ordered   2  Potassium Chloride ER 20 MEQ Oral Tablet Extended Release; One daily  Requested   for: 63Lek3671; Last Rx:34Hxl2069 Ordered    3  DULoxetine HCl - 60 MG Oral Capsule Delayed Release Particles; take 1 capsule daily; Therapy: 20TZV4209 to (Evaluate:22Jan2018)  Requested for: 95Mmg3673; Last   Rx:37Cax4365 Ordered   4  LORazepam 0 5 MG Oral Tablet; 1 PO BID PRN; Therapy: 97UXX9227 to (Algis Spittle)  Requested for: 81DHZ3171; Last   Rx:41Ivz2773 Ordered    5  Syringe 30 ML Miscellaneous; USE AS DIRECTED; Therapy: 81JJR1279 to (Last Rx:67Reb5099)  Requested for: 27UKR6582 Ordered    6  Aspirin 81 MG Oral Tablet Delayed Release; take 1 tablet by mouth daily;    Therapy: 00DZH5116 to (Evaluate:17Sep2017); Last Rx:19Jun2017 Ordered   7  Atorvastatin Calcium 80 MG Oral Tablet (Lipitor); TAKE 1 TABLET AT BEDTIME    Requested for: 45Ren6673; Last Rx:12Jpf3813 Ordered    8  Losartan Potassium 100 MG Oral Tablet; Take 1 tablet daily; Therapy: 25CHL0837 to (Evaluate:22Jan2018)  Requested for: 57Wyc1500; Last   Rx:95Lil7856 Ordered   9  Metoprolol Succinate ER 50 MG Oral Tablet Extended Release 24 Hour; TAKE 1 TABLET   DAILY  Requested for: 68Ncz3170; Last Rx:92Edt9017 Ordered    10  Clopidogrel Bisulfate 75 MG Oral Tablet (Plavix); TAKE 1 TABLET DAILY  Requested for:    46Ssc1533; Last Rx:26Jul2017 Ordered    11  Gabapentin 300 MG Oral Capsule; TAKE 1 CAPSULE TWICE DAILY; Therapy: 94OML1903 to (Evaluate:22Jan2018)  Requested for: 09Ifc8998; Last    Rx:79Shb5457 Ordered    12  Allopurinol 300 MG Oral Tablet; TAKE 1 TABLET DAILY; Therapy: 48ILT3485 to (Evaluate:22Jan2018)  Requested for: 16Vkt9526; Last    Rx:59Kja9138 Ordered    13  BD Pen Needle Jodi U/F 32G X 4 MM Miscellaneous; USE 4 TIMES A DAY; Therapy: 79TYY7689 to ([facility])  Requested for: 99HQY8001; Last    Rx:00Cfz7427 Ordered   14  HumaLOG KwikPen 100 UNIT/ML Subcutaneous Solution Pen-injector; 15 units before    meals; Therapy: 12YNF4413 to ([facility])  Requested for: 70MXH5882; Last    Rx:05Ffd2440 Ordered   15  Lantus SoloStar 100 UNIT/ML Subcutaneous Solution Pen-injector; USE AS DIRECTED  50 units at bedtime; Therapy: 15Gzp1279 to ([facility])  Requested for: 31WON5305; Last    Rx:78Otj2232 Ordered    16  Multi Vitamin/Minerals TABS; TAKE 1 TABLET DAILY; Therapy: (Recorded:67Upg8933) to Recorded    Future Appointments    Date/Time Provider Specialty Site   10/17/2017 01:40 PM RAJESH Bullard   Endocrinology [facility] ENDOCRINOLOGY   08/22/2017 03:00 PM Selin Hinton DO Family Medicine [facility]   08/03/2017 01:15 PM Clint Bonds MD Gynecological Oncology [facility] [address]     Patient Care Team    Care Team Member Role Specialty Office Number   Brian Mccauley MD Referring Orthopedic Surgery [phone]   [place]  Specialist Neurosurgery [phone]   Renee Olson MD Specialist Gynecological Oncology [phone]   Price Corrales MD Specialist Radiation Oncology [phone]   [place]    Endocrinology [phone]     Signatures   Electronically signed by : Karen Barreto RN; Jul 26 2017  3:28PM EST                       (Author)

## 2018-01-16 NOTE — MISCELLANEOUS
Message   Recorded as Task   Date: 10/01/2016 10:21 AM, Created By: Ned Pearson   Task Name: Medical Complaint Callback   Assigned To: Nate Hinton   Regarding Patient: Allyson Parrish, Status: Active   CommentAmos Anthony - 01 Oct 2016 10:21 AM     TASK CREATED  Caller: Self; Medical Complaint; [phone] (Home); [phone] (Work)  PT IS HAVING A UTI  IS HAVING FREQUENCY AND BURNING  CANNOT GET IN UNTIL FRIDAY DUE TO TRANSPORT ISSUE  [facility]  CAN CALL PT [phone]   Nate Hinton - 01 Oct 2016 10:49 AM     TASK EDITED                 i sent a script for cipro to the pharmacy  patient cannot get transportation  Plan  Acute urinary tract infection    · Ciprofloxacin HCl - 250 MG Oral Tablet;  Take 1 tablet twice daily    Signatures   Electronically signed by : Jesus Schwartz DO; Oct  1 2016 10:49AM EST                       (Author)

## 2018-01-17 NOTE — MISCELLANEOUS
Message  patient notified of labs  labs are stable   sugars still need improvement but 8 2 is better than its been        Signatures   Electronically signed by : Nicole Sawyer DO; Sep  2 2016  1:16PM EST                       (Author)

## 2018-01-17 NOTE — MISCELLANEOUS
Provider Comments  Provider Comments:   PATIENT NO SHOWED FOR 1- APPT  Signatures   Electronically signed by :  Renetta Andres; Jan 30 2017  1:21PM EST                       (Author)

## 2018-01-22 VITALS
HEIGHT: 61 IN | DIASTOLIC BLOOD PRESSURE: 70 MMHG | SYSTOLIC BLOOD PRESSURE: 110 MMHG | HEART RATE: 96 BPM | BODY MASS INDEX: 33.61 KG/M2 | WEIGHT: 178 LBS

## 2018-01-23 VITALS
BODY MASS INDEX: 37.6 KG/M2 | SYSTOLIC BLOOD PRESSURE: 126 MMHG | HEART RATE: 99 BPM | DIASTOLIC BLOOD PRESSURE: 64 MMHG | WEIGHT: 199 LBS

## 2018-01-23 NOTE — CONSULTS
Chief Complaint  Six month follow up with EKG      History of Present Illness  Followup for CHF and CAD    Doing well  No chest pain, stable dyspnea, no palpitations  tolerating medical therapy  No orthopnea  The patient states she has been stable with her coronary artery disease symptoms since the last visit  Comorbid Illnesses: cardiac failure  Symptoms: denies chest pain when at rest, denies exertional chest pain, stable dyspnea, stable fatigue and stable exercise intolerance  Associated symptoms include no syncope, no palpitations, no PND, no orthopnea and no edema  Review of Systems      Cardiac: No complaints of chest pain, no palpitations, no fainting  and no signs of swelling  Skin: No complaints of nonhealing sores or skin rash  Genitourinary: No complaints of recurrent urinary tract infections, frequent urination at night, difficult urination, blood in urine, kidney stones, loss of bladder control, kidney problems, denies any birth control or hormone replacement, is not post menopausal, not currently pregnant  Psychological: No complaints of feeling depressed, anxiety, panic attacks, or difficulty concentrating  General: No complaints of trouble sleeping, lack of energy, fatigue, appetite changes, weight changes, fever, frequent infections, or night sweats  Respiratory: shortness of breath  HEENT: No complaints of serious problems, hearing problems, nose problems, throat problems, or snoring  Gastrointestinal: No complaints of liver problems, nausea, vomiting, heartburn, constipation, bloody stools, diarrhea, problems swallowing, adbominal pain, or rectal bleeding  Hematologic: No complaints of bleeding disorders, anemia, blood clots, or excessive brusing  Neurological: No complaints of numbness, tingling, dizziness, weakness, seizures, headaches, syncope or fainting, AM fatigue, daytime sleepiness, no witnessed apnea episodes     Musculoskeletal: No complaints of arthritis, back pain, or painfull swelling  ROS reviewed  Active Problems    1  Acute congestive heart failure (428 0) (I50 9)   2  Alopecia (704 00) (L65 9)   3  Anxiety (300 00) (F41 9)   4  Bladder incontinence (788 30) (R32)   5  CAD (coronary artery disease) (414 00) (I25 10)   6  Chronic diastolic CHF (congestive heart failure) (428 32,428 0) (I50 32)   7  Claudication (443 9) (I73 9)   8  Constipation (564 00) (K59 00)   9  Depression (311) (F32 9)   10  Diabetes mellitus (250 00) (E11 9)   11  Endometrial cancer (182 0) (C54 1)   12  Foot pain (729 5) (M79 673)   13  Former smoker (Z87 891)   15  Gait disturbance (781 2) (R26 9)   15  Hyperlipidemia (272 4) (E78 5)   16  Hypertension (401 9) (I10)   17  Meralgia paresthetica of right side (355 1) (G57 11)   18  Nausea (787 02) (R11 0)   19  NSTEMI, initial episode of care (410 71) (I21 4)   20  Osteoporosis (733 00) (M81 0)   21  Peripheral neuropathy (356 9) (G62 9)   22  Primary localized osteoarthritis of right knee (715 16) (M17 11)   23  Psoriasis (696 1) (L40 9)   24  Quadriceps weakness (728 87) (M62 81)   25  Spinal stenosis (724 00) (M48 00)   26  TIA (transient ischemic attack) (435 9) (G45 9)   27  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, with long-term    current use of insulin (250 52,V58 67) (E11 39,E11 65,Z79  4)    Past Medical History    · History of Acute knee pain (719 46) (M25 569)   · History of Acute leg pain (729 5) (M79 606)   · History of Acute urinary tract infection (599 0) (N39 0)   · History of Bilateral numbness and tingling of arms and legs (782 0) (R20 0,R20 2)   · History of Foot pain (729 5) (M79 673)   · History of Gait disturbance (781 2) (R26 9)   · History of Gout (274 9) (M10 9)   · History of  3   · History of abdominal pain (V13 89) (A17 846)   · History of acute sinusitis (V12 69) (Z87 09)   · History of allergic rhinitis (V12 69) (Z87 09)   · History of dizziness (V13 89) (R09 337)   · History of headache (V13 89) (Z87 898)   · History of hyperlipidemia (V12 29) (Z86 39)   · History of nausea (V12 79) (O89 471)   · History of psoriatic arthritis (V13 4) (Z87 2)   · History of sciatica (V12 49) (Z86 69)   · History of stroke (V12 54) (Z86 73)   · History of urinary frequency (V13 09) (J36 324)   · History of weakness (V13 89) (X98 600)   · History of Knee pain (719 46) (M25 569)   · History of Lumbar radiculopathy (724 4) (M54 16)   · History of Pain of lower extremity (729 5) (M79 606)   · Personal history of arthritis (V13 4) (Z87 39)   · History of Postmenopausal bleeding (627 1) (N95 0)   · History of Screening for osteoporosis (V82 81) (G69 395)   · History of Urinary tract infection (599 0) (N39 0)   · History of Vertigo following CVA (cerebrovascular accident) (438 85) (J11 606,G56)    The active problems and past medical history were reviewed and updated today  Surgical History    · History of Cardiac Cath Lesion 1, 1st Adjunct Treat Device: Stent   · History of Cholecystectomy Laparoscopic   · History of Laparoscopy With Total Hysterectomy   · History of Pelvic Examination Under Anesthesia   · History of Salpingo-oophorectomy Bilateral   · History of Tubal Ligation   · History of Umbilical Hernia Repair   · History of Uterine Myomectomy    The surgical history was reviewed and updated today  Family History    · Family history of cardiac disorder (V17 49) (Z82 49)   · Family history of hypertension (V17 49) (Z82 49)    · Family history of diabetes mellitus (V18 0) (Z83 3)   · Family history of hypertension (V17 49) (Z82 49)   · Family history of malignant neoplasm (V16 9) (Z80 9)   · Family history of malignant neoplasm of prostate (V16 42) (Z80 42)    · Family history of arthritis (V17 7) (Z82 61)   · Family history of osteoporosis (V17 81) (Z82 62)    The family history was reviewed and updated today         Social History    · Former smoker (O48 964)   · No drug use   · Social alcohol use (Z78 9)  The social history was reviewed and updated today  Current Meds   1  Acetaminophen 500 MG Oral Tablet; Therapy: (Recorded:17Oct2017) to Recorded   2  Allopurinol 300 MG Oral Tablet; TAKE 1 TABLET DAILY; Therapy: 37BCU2125 to (Evaluate:22Jan2018)  Requested for: 90Wsw9240; Last   Rx:21Yuv5953 Ordered   3  Aspirin  MG Oral Tablet Delayed Release; Therapy: 06MKS2052 to (Evaluate:16Apr2018) Recorded   4  Atorvastatin Calcium 80 MG Oral Tablet; TAKE 1 TABLET AT BEDTIME  Requested for:   57Ntl8552; Last Rx:64Zqd6711 Ordered   5  BD Pen Needle Jodi U/F 32G X 4 MM Miscellaneous; USE 4 TIMES A DAY; Therapy: 94CKD2290 to (Fareed Nur)  Requested for: 02OQD1121; Last   Rx:99Epb7088 Ordered   6  BusPIRone HCl - 15 MG Oral Tablet; TAKE 1 TABLET TWICE DAILY; Therapy: 73FQQ3133 to Recorded   7  BusPIRone HCl - 7 5 MG Oral Tablet; Therapy: (Recorded:17Oct2017) to Recorded   8  Clopidogrel Bisulfate 75 MG Oral Tablet; TAKE 1 TABLET DAILY  Requested for:   68Bcd1698; Last Rx:12Kgv8164 Ordered   9  DULoxetine HCl - 60 MG Oral Capsule Delayed Release Particles; take 1 capsule daily; Therapy: 97UYI5289 to (Evaluate:22Jan2018)  Requested for: 93Gvt8281; Last   Rx:61Omw1983 Ordered   10  Furosemide 40 MG Oral Tablet; TAKE 1 TABLET DAILY  Requested for: 09Xeb3018; Last    Rx:58Gvs3300 Ordered   11  Gabapentin 300 MG Oral Capsule; TAKE 1 CAPSULE TWICE DAILY; Therapy: 60YXB2451 to (Evaluate:22Jan2018)  Requested for: 69Dto8067; Last    Rx:54Efy3070 Ordered   12  HumaLOG KwikPen 100 UNIT/ML Subcutaneous Solution Pen-injector; 18 units before    meals; Therapy: 53OXA1598 to (Evaluate:14Feb2018)  Requested for: 74WSC3574; Last    Rx:17Oct2017 Ordered   13  Lantus SoloStar 100 UNIT/ML Subcutaneous Solution Pen-injector; USE AS DIRECTED  50 units at bedtime; Therapy: 14Apr2017 to (Evaluate:14Feb2018)  Requested for: 17Oct2017; Last    Rx:17Oct2017 Ordered   14   LORazepam 0 5 MG Oral Tablet; 1 PO BID PRN; Therapy: 35SYX5185 to (Renae Torres)  Requested for: 68IKO5103; Last    Rx:77Tne7817 Ordered   15  Losartan Potassium 100 MG Oral Tablet; Take 1 tablet daily; Therapy: 65FSR5126 to (Evaluate:22Jan2018)  Requested for: 14Yee6780; Last    Rx:72Alu8868 Ordered   16  Metoprolol Succinate ER 50 MG Oral Tablet Extended Release 24 Hour; TAKE 1 TABLET    DAILY  Requested for: 27Ioa0069; Last Rx:02Kes5709 Ordered   17  Multi Vitamin/Minerals TABS; TAKE 1 TABLET DAILY; Therapy: (Ronni Izaguirre) to Recorded   18  Potassium Chloride ER 20 MEQ Oral Tablet Extended Release; One daily  Requested    for: 36Tir6559; Last Rx:12Whq2994 Ordered   19  RaNITidine HCl - 150 MG Oral Tablet; TAKE 1 TABLET TWICE DAILY; Therapy: 50HZM9013 to Recorded   20  Syringe 30 ML Miscellaneous; USE AS DIRECTED; Therapy: 57PJY9670 to (Last Rx:05Jun2017)  Requested for: 75ITW9603 Ordered    The medication list was reviewed and updated today  Allergies    1  Sulfa Drugs    Vitals   Recorded: 13ZVV8749 11:38AM   Heart Rate 99   Systolic 017   Diastolic 64   Weight 476 lb    BMI Calculated 37 6   BSA Calculated 1 89     Physical Exam    Constitutional   General appearance: No acute distress, well appearing and well nourished  Eyes   Conjunctiva and Sclera examination: Conjunctiva pink, sclera anicteric  Ears, Nose, Mouth, and Throat - Oropharynx: Clear, nares are clear, mucous membranes are moist    Neck   Neck and thyroid: Normal, supple, trachea midline, no thyromegaly  Pulmonary   Respiratory effort: No increased work of breathing or signs of respiratory distress  Auscultation of lungs: Clear to auscultation, no rales, no rhonchi, no wheezing, good air movement  Cardiovascular   Auscultation of heart: Normal rate and rhythm, normal S1 and S2, no murmurs  Carotid pulses: Normal, 2+ bilaterally  Peripheral vascular exam: Normal pulses throughout, no tenderness, erythema or swelling  Pedal pulses: Normal, 2+ bilaterally  Examination of extremities for edema and/or varicosities: Normal     Abdomen   Abdomen: Non-tender and no distention  Liver and spleen: No hepatomegaly or splenomegaly  Musculoskeletal Gait and station: Normal gait  Digits and nails: Normal without clubbing or cyanosis  Inspection/palpation of joints, bones, and muscles: Normal, ROM normal     Skin - Skin and subcutaneous tissue: Normal without rashes or lesions  Skin is warm and well perfused, normal turgor  Neurologic - Cranial nerves: II - XII intact  Speech: Normal     Psychiatric - Orientation to person, place, and time: Normal  Mood and affect: Normal       Results/Data    Rhythm and rate:  ventricular rate is beats per minute  normal sinus rhythm  DC Interval: 99 seconds  Axis: left axis deviation  ST segment: the ST segments are normal  (1) COMPREHENSIVE METABOLIC PANEL 21XBH4673 50:17MW Jeevan CLAROS Order Number: [id number]     Test Name Result Flag Reference   SODIUM 136 mmol/L  136-145   POTASSIUM 4 2 mmol/L  3 5-5 3   CHLORIDE 99 mmol/L L 100-108   CARBON DIOXIDE 28 mmol/L  21-32   ANION GAP (CALC) 9 mmol/L  4-13   BLOOD UREA NITROGEN 21 mg/dL  5-25   CREATININE 0 76 mg/dL  0 60-1 30   Standardized to IDMS reference method   CALCIUM 9 4 mg/dL  8 3-10 1   BILI, TOTAL 0 29 mg/dL  0 20-1 00   ALK PHOSPHATAS 147 U/L H    ALT (SGPT) 39 U/L  12-78   AST(SGOT) 18 U/L  5-45   ALBUMIN 3 0 g/dL L 3 5-5 0   TOTAL PROTEIN 7 1 g/dL  6 4-8 2   eGFR Non-African American      >60 0 ml/min/1 73sq [facility] [employer] Disease Education Program recommendations are as follows:  GFR calculation is accurate only with a steady state creatinine  Chronic Kidney disease less than 60 ml/min/1 73 sq  meters  Kidney failure less than 15 ml/min/1 73 sq  meters     GLUCOSE FASTING 391 mg/dL H 65-99     (1) LIPID PANEL, FASTING 52LBU3674 09:05AM Jeevan CLAROS Order Number: [id number]     Test Name Result Flag Reference   CHOLESTEROL 179 mg/dL     HDL,DIRECT 34 mg/dL L 40-60   Specimen collection should occur prior to Metamizole administration due to the potential for falsely depressed results  LDL CHOLESTEROL CALCULATED 81 mg/dL  0-100   Triglyceride:         Normal              <150 mg/dl       Borderline High    150-199 mg/dl       High               200-499 mg/dl       Very High          >499 mg/dl  Cholesterol:         Desirable        <200 mg/dl      Borderline High  200-239 mg/dl      High             >239 mg/dl  HDL Cholesterol:        High    >59 mg/dL      Low     <41 mg/dL  LDL CALCULATED:    This screening LDL is a calculated result  It does not have the accuracy of the Direct Measured LDL in the monitoring of patients with hyperlipidemia and/or statin therapy  Direct Measure LDL (GVJ169) must be ordered separately in these patients  TRIGLYCERIDES 322 mg/dL H <=150   Specimen collection should occur prior to N-Acetylcysteine or Metamizole administration due to the potential for falsely depressed results  Assessment    1  Chronic diastolic CHF (congestive heart failure) (428 32,428 0) (I50 32)   2  CAD (coronary artery disease) (414 00) (I25 10)   3  Hypertension (401 9) (I10)    Plan  Acute congestive heart failure, Chronic diastolic CHF (congestive heart failure),  Hypertension    · EKG/ECG- POC; Status:Complete;   Done: 41OQE6225 11:37AM   Perform: In Office; Last Updated Aliya Martinez; 1/16/2018 11:38:05 AM;Ordered; For:Acute congestive heart failure, Chronic diastolic CHF (congestive heart failure), Hypertension; Ordered By:Royer Sotelo; Chronic diastolic CHF (congestive heart failure)    · Follow-up visit in 6 months Evaluation and Treatment  Follow-up  Status: Complete   Done: 52RVL1797   Ordered; For: Chronic diastolic CHF (congestive heart failure); Ordered By: Eda Ramírez Performed:  Due: 89EVO6055;  Last Updated By: Ericka Sexton; 1/16/2018 12:05:02 PM  NSTEMI, initial episode of care    · Clopidogrel Bisulfate 75 MG Oral Tablet (Plavix)   Rx By: Saadia Hall; Dispense: 90 Days ; #:90 Tablet; Refill: 1; For: NSTEMI, initial episode of care; DENIS = N; Sent To: [address] [place] [address]; Last Updated By: Jaclyn Fernando; 1/16/2018 12:03:34 PM    Discussion/Summary    1  Coronary Artery Disease: s/p PCI to LAD and RCA  Overall doing well  Continue aspirin, metoprolol and atorvastatin  Lipids are stable  Discontinue clopidogrel  2  Chronic Diastolic CHF: volume status stable on lasix  No changes  labs are stable  3  HTN: BP stable on medical therapy  No changes  The patient was counseled regarding diagnostic results, instructions for management, risk factor reductions, impressions  total time of encounter was 25 minutes and 15 minutes was spent counseling        Future Appointments    Signatures   Electronically signed by : RAJESH Rivera ; Jan 16 2018 12:10PM EST                       (Author)    Electronically signed by : RAJESH Rivera ; Jan 16 2018 12:10PM EST                       (Author)

## 2018-01-24 NOTE — MISCELLANEOUS
Assessment   1  Chronic diastolic CHF (congestive heart failure) (428 32,428 0) (I50 32)  2  Diabetes mellitus (250 00) (E11 9)  3  Peripheral neuropathy (356 9) (G62 9)  4  Depression (311) (F32 9)  5  CAD (coronary artery disease) (414 00) (I25 10)    Plan   CAD (coronary artery disease)    · Follow-up visit in 2 weeks Evaluation and Treatment  Follow-up  Status: Hold For -  Scheduling  Requested for: 93GRC3969  Ordered; For: CAD (coronary artery disease); Ordered By: Kristopher Webb  Performed:   Due:   98HCF1068  Diabetes mellitus    · Renew: HumaLOG 100 UNIT/ML Subcutaneous Solution; INJECT 8 UNIT Before meals  Rx By: Kristopher Webb; Dispense: 30 Days ; #:1 X 10 ML Vial; Refill: 3;For: Diabetes mellitus;   DENIS = N; Verified Transmission to [address] [place] [address]; Last   Updated By: System, SureScript[employer]; 2/8/2017 2:51:40 PM    DULoxetine HCl - 60 MG Oral Capsule Delayed Release Particles; take 1 capsule daily; Therapy: 17PFX4659 to (Evaluate:23Grm9022)  Requested for: 45XTQ3764; Last Rx:51Byc0068; Status: ACTIVE Ordered  Rx By: Kristopher Webb; Dispense: 90 Days ; #:90 Capsule Delayed Release Particles; Refill: 1;   For: Anxiety; DENIS = N; Verified Transmission to [address] [place] [address],; Last Updated By: System, SureScripts; 2/8/2017 2:41:37 PM     Discussion/Summary  Discussion Summary:   F/u cardiology  will increase cymbalta to 6 0mg daily  start gabapentin for nerve pain in legs 300 mg at night and after 1 week   add morning dose   discused moving to assisted living  f/u in 2 weeks     History of Present Illness  TCM Communication [facility]: The patient is being contacted for follow-up after hospitalization and Was in [facility] from 01/12/17 until 01/15/17 when she was transferred to [facility] for cardiac catheterization  Hospital records were reviewed  She was hospitalized at [facility]  The date of admission: 01/21/17, date of discharge: 02/03/17, Was in [facility] for rehab  Diagnosis: Acute CHF, DM, Gout, HTN, NSTEMI, Altered Mental Status  She was discharged to home, with home health services, Patient has VNA, and Physical Therapy  Medications reviewed and updated today  She scheduled a follow up appointment  Follow-up appointments with other specialists: Patient states saw cardiology [place] [facility]  Symptoms: fatigue  The patient is currently experiencing symptoms  Patient reports being very anxious  Post hospital issues: Patient states she is confused about her insulin  Advised her to communicate these concerns with the visiting nurse  When reviewing medications she did not state she had Glucagon or Glutose which was on medication list  I did not further investigate this because patient had enough concerns about the other medications  Advised patient to review her meds with the visitng nurse if she had concerns  She also wants to restart the Lorazepam as this is not on her discharge list  Advised her to go over this with Dr Carlene Fallon  Counseling was provided to the patient  Topics counseled included instructions for management and importance of compliance with treatment  Communication performed and completed by CALEB Toledo    HPI: here for ben   complaining of ankle pain and swelling  very depressed  having burning pain in legs b/l  getting harder to care for herself  reviewed hospitla records  following with cardiology1        1 Amended By: Magno Pandey; Feb 08 2017 3:42 PM EST    Review of Systems  Complete-Female:   Constitutional: No fever, no chills, feels well, no tiredness, no recent weight gain or weight loss  Eyes: No complaints of eye pain, no red eyes, no eyesight problems, no discharge, no dry eyes, no itching of eyes  ENT: no complaints of earache, no loss of hearing, no nose bleeds, no nasal discharge, no sore throat, no hoarseness     Cardiovascular: No complaints of slow heart rate, no fast heart rate, no chest pain, no palpitations, no leg claudication, no lower extremity edema  Respiratory: No complaints of shortness of breath, no wheezing, no cough, no SOB on exertion, no orthopnea, no PND  Gastrointestinal: No complaints of abdominal pain, no constipation, no nausea or vomiting, no diarrhea, no bloody stools  Genitourinary: No complaints of dysuria, no incontinence, no pelvic pain, no dysmenorrhea, no vaginal discharge or bleeding  Musculoskeletal: as noted in HPI1  1   Integumentary: No complaints of skin rash or lesions, no itching, no skin wounds, no breast pain or lump  Neurological: numbness1 , tingling1 , limb weakness1  and difficulty walking1 , but as noted in HPI1  1   Psychiatric: Not suicidal, no sleep disturbance, no anxiety or depression, no change in personality, no emotional problems  Endocrine: No complaints of proptosis, no hot flashes, no muscle weakness, no deepening of the voice, no feelings of weakness  Hematologic/Lymphatic: No complaints of swollen glands, no swollen glands in the neck, does not bleed easily, does not bruise easily  1 Amended By: Saira Luis; Feb 08 2017 3:43 PM EST    Active Problems   1  Acute congestive heart failure (428 0) (I50 9)  2  Alopecia (704 00) (L65 9)  3  Anxiety (300 00) (F41 9)  4  Bladder incontinence (788 30) (R32)  5  CAD (coronary artery disease) (414 00) (I25 10)  6  Chronic diastolic CHF (congestive heart failure) (428 32,428 0) (I50 32)  7  Claudication (443 9) (I73 9)  8  Constipation (564 00) (K59 00)  9  Diabetes mellitus (250 00) (E11 9)  10  Encounter for screening mammogram for breast cancer (V76 12) (Z12 31)  11  Endometrial cancer (182 0) (C54 1)  12  Foot pain (729 5) (M79 673)  13  Former smoker (Z87 891)  15  Gait disturbance (781 2) (R26 9)  15  Hyperlipidemia (272 4) (E78 5)  16  Hypertension (401 9) (I10)  17  Meralgia paresthetica of right side (355 1) (G57 11)  18  Nausea (787 02) (R11 0)  19  NSTEMI, initial episode of care (410 71) (I21 4)  20  Osteoporosis (733 00) (M81 0)  21  Primary localized osteoarthritis of right knee (715 16) (M17 11)  22  Psoriasis (696 1) (L40 9)  23  Quadriceps weakness (728 87) (M62 81)  24  Screening for osteoporosis (V82 81) (Z13 820)  25  Spinal stenosis (724 00) (M48 00)  26  TIA (transient ischemic attack) (435 9) (G45 9)    Past Medical History   1  History of Acute knee pain (719 46) (M25 569)  2  History of Acute leg pain (729 5) (M79 606)  3  History of Acute urinary tract infection (599 0) (N39 0)  4  History of Bilateral numbness and tingling of arms and legs (782 0) (R20 2)  5  History of Flu vaccine need (V04 81) (Z23)  6  History of Flu vaccine need (V04 81) (Z23)  7  History of Foot pain (729 5) (M79 673)  8  History of Gait disturbance (781 2) (R26 9)  9  History of Gout (274 9) (M10 9)  10  History of  3  11  History of abdominal pain (V13 89) (Z87 898)  12  History of acute sinusitis (V12 69) (Z87 09)  13  History of allergic rhinitis (V12 69) (Z87 09)  14  History of dizziness (V13 89) (Z87 898)  15  History of headache (V13 89) (Z87 898)  16  History of hyperlipidemia (V12 29) (Z86 39)  17  History of nausea (V12 79) (Z87 898)  18  History of psoriatic arthritis (V13 4) (Z87 2)  19  History of sciatica (V12 49) (Z86 69)  20  History of stroke (V12 54) (Z86 73)  21  History of urinary frequency (V13 09) (Z87 898)  22  History of weakness (V13 89) (Z87 898)  23  History of Knee pain (719 46) (M25 569)  24  History of Lumbar radiculopathy (724 4) (M54 16)  25  History of Pain of lower extremity (729 5) (M79 606)  26  Personal history of arthritis (V13 4) (Z87 39)  27  History of Postmenopausal bleeding (627 1) (N95 0)  28  History of Screening for osteoporosis (V82 81) (Z13 820)  29  History of Urinary tract infection (599 0) (N39 0)  30  History of Vertigo following CVA (cerebrovascular accident) (104 31) (E83 560,O68)    Surgical History   1   History of Cardiac Cath Lesion 1, 1st Adjunct Treat Device: Stent  2  History of Cholecystectomy Laparoscopic  3  History of Laparoscopy With Total Hysterectomy  4  History of Pelvic Examination Under Anesthesia  5  History of Salpingo-oophorectomy Bilateral  6  History of Tubal Ligation  7  History of Umbilical Hernia Repair  8  History of Uterine Myomectomy  Surgical History Reviewed: The surgical history was reviewed and updated today  Family History  Mother   1  Family history of cardiac disorder (V17 49) (Z82 49)  2  Family history of hypertension (V17 49) (Z82 49)  Father   3  Family history of diabetes mellitus (V18 0) (Z83 3)  4  Family history of hypertension (V17 49) (Z82 49)  5  Family history of malignant neoplasm (V16 9) (Z80 9)  6  Family history of malignant neoplasm of prostate (V16 42) (Z80 45)  Family History   7  Family history of arthritis (V17 7) (Z82 61)  8  Family history of osteoporosis (V17 81) (Z82 62)  Family History Reviewed: The family history was reviewed and updated today  Social History    · Former smoker (M51 181)   · No drug use   · Social alcohol use (Z78 9)  Social History Reviewed: The social history was reviewed and updated today  The social history was reviewed and is unchanged  Current Meds  1  Allopurinol 300 MG Oral Tablet; TAKE 1 TABLET DAILY; Therapy: 09WQZ3587 to (Evaluate:13May2017)  Requested for: 85NLG9513; Last   Rx:14Nov2016 Ordered  2  Aspirin 325 MG Oral Tablet; TAKE 1 TABLET DAILY; Therapy: 95VEZ9556 to Recorded  3  BD Insulin Syringe 30G X 1/2" 0 5 ML Miscellaneous; USE AS DIRECTED; Therapy: 99ZHE1891 to (Last Rx:66Lpn2220)  Requested for: 74ENE9862 Ordered  4  DULoxetine HCl - 30 MG Oral Capsule Delayed Release Particles; take 1 capsule by   mouth once daily; Therapy: 53NCW7228 to (Evaluate:27Mar2017)  Requested for: 18NMH3888; Last   Rx:26Jan2017 Ordered  5  HumaLOG 100 UNIT/ML Subcutaneous Solution; INJECT 8 UNIT Before meals;    Therapy: 79BWR6364 to (Evaluate:18Jan2017)  Requested for: 83RLN9535; Last   Rx:63Fph9916 Ordered  6  Lasix 40 MG Oral Tablet; TAKE 1 TABLET DAILY; Therapy: (FGVZSDRO:99MTL8616) to Recorded  7  Levemir FlexTouch 100 UNIT/ML Subcutaneous Solution Pen-injector; INJECT 45 UNIT   Twice daily; Therapy: 53IZT8023 to (Last Rx:65Sfd6492)  Requested for: 08Tqz5296 Ordered  8  Lipitor 80 MG Oral Tablet; TAKE 1 TABLET AT BEDTIME; Therapy: (ATQIQTNF:42ADS8941) to Recorded  9  LORazepam 0 5 MG Oral Tablet; 1 PO BID PRN; Therapy: 60QYB7747 to (Evaluate:08Apr2017)  Requested for: 64OFR4776; Last   Rx:98Ggh8192 Ordered  10  Losartan Potassium 100 MG Oral Tablet; Take 1 tablet daily; Therapy: 91JGS3520 to (Evaluate:10Oct2016)  Requested for: 14Jsm6751; Last    Rx:53Vyp2569 Ordered  11  Metoprolol Succinate ER 50 MG Oral Tablet Extended Release 24 Hour; TAKE 1 TABLET    DAILY; Therapy: (QWECYNHV:11VSB9363) to Recorded  12  Multi Vitamin/Minerals TABS; TAKE 1 TABLET DAILY; Therapy: (Carey Fuller) to Recorded  13  Plavix 75 MG Oral Tablet; TAKE 1 TABLET DAILY; Therapy: (IKHFFWQN:73EEW7508) to Recorded  14  Potassium Chloride ER 20 MEQ Oral Tablet Extended Release; One daily; Therapy: (QGUTIUFX:42XVG1612) to Recorded  Medication List Reviewed: The medication list was reviewed and updated today  Allergies   1  Sulfa Drugs    Vitals  Signs   Recorded: 87QLZ0012 02:09PM   Heart Rate: 96  Systolic: 027, LUE, Sitting  Diastolic: 70, LUE, Sitting  Height: 5 ft 1 in  Weight: 178 lb   BMI Calculated: 33 63  BSA Calculated: 1 79    Physical Exam    Constitutional   General appearance: No acute distress, well appearing and well nourished  Ears, Nose, Mouth, and Throat1    Otoscopic examination: Tympanic membranes translucent with normal light reflex  Canals patent without erythema  1    Oropharynx: Normal with no erythema, edema, exudate or lesions  1    Pulmonary1    Respiratory effort: No increased work of breathing or signs of respiratory distress  1    Auscultation of lungs: Clear to auscultation  1    Cardiovascular1    Auscultation of heart: Normal rate and rhythm, normal S1 and S2, without murmurs  1    Abdomen1    Abdomen: Non-tender, no masses  1    Liver and spleen: No hepatomegaly or splenomegaly  1    Musculoskeletal1    Gait and station: Abnormal  1  Using wheelchair1   Digits and nails: Normal without clubbing or cyanosis  1    Inspection/palpation of joints, bones, and muscles: Normal 1          1 Amended By: Leonardo Peng; Feb 08 2017 3:43 PM EST    Health Management  Diabetes mellitus   *VB - Eye Exam; every 1 year; Last 60LBK9875; Next Due: 13LVI9422; Active  *VB - Foot Exam; every 1 year; Last 86GHL1967; Next Due: 06JTG9372; Active  Health Maintenance   Medicare Annual Wellness Visit; every 1 year; Next Due: 06Hyw4749;  Overdue    Future Appointments    Date/Time Provider Specialty Site   02/24/2017 02:00 PM Page, Lazarus Derry, DO Family Medicine [facility]   02/28/2017 01:00 PM Dmitry Garcia MD Gynecological Oncology [facility]     Signatures   Electronically signed by : Kevin Kay DO; Feb 8 2017  3:41PM EST                       (Author)    Electronically signed by : Kevin Kay DO; Feb 8 2017  3:43PM EST                       (Author)

## 2018-02-13 ENCOUNTER — DOCTOR'S OFFICE (OUTPATIENT)
Dept: URBAN - METROPOLITAN AREA CLINIC 136 | Facility: CLINIC | Age: 73
Setting detail: OPHTHALMOLOGY
End: 2018-02-13
Payer: COMMERCIAL

## 2018-02-13 DIAGNOSIS — H25.013: ICD-10-CM

## 2018-02-13 DIAGNOSIS — H04.123: ICD-10-CM

## 2018-02-13 DIAGNOSIS — H43.811: ICD-10-CM

## 2018-02-13 DIAGNOSIS — E11.3513: ICD-10-CM

## 2018-02-13 DIAGNOSIS — H25.043: ICD-10-CM

## 2018-02-13 PROCEDURE — 67028 INJECTION EYE DRUG: CPT | Performed by: OPHTHALMOLOGY

## 2018-02-13 PROCEDURE — 92134 CPTRZ OPH DX IMG PST SGM RTA: CPT | Performed by: OPHTHALMOLOGY

## 2018-02-13 PROCEDURE — 92014 COMPRE OPH EXAM EST PT 1/>: CPT | Performed by: OPHTHALMOLOGY

## 2018-02-13 ASSESSMENT — CONFRONTATIONAL VISUAL FIELD TEST (CVF)
OD_COMMENTS: KEPT LOOKING AT FINGERS
OD_FINDINGS: CONSTRICTION
OS_FINDINGS: FULL

## 2018-02-13 ASSESSMENT — TEAR BREAK UP TIME (TBUT)
OS_TBUT: 2+
OD_TBUT: 2+

## 2018-02-13 ASSESSMENT — CORNEAL EDEMA CLINICAL DESCRIPTION
OD_CORNEALEDEMA: ABSENT
OS_CORNEALEDEMA: ABSENT

## 2018-02-15 ENCOUNTER — RX ONLY (RX ONLY)
Age: 73
End: 2018-02-15

## 2018-02-15 ASSESSMENT — REFRACTION_CURRENTRX
OD_OVR_VA: 20/
OD_OVR_VA: 20/
OS_OVR_VA: 20/
OS_OVR_VA: 20/
OD_OVR_VA: 20/
OS_OVR_VA: 20/

## 2018-02-15 ASSESSMENT — REFRACTION_MANIFEST
OS_VA1: 20/
OD_VA2: 20/
OS_VA2: 20/
OD_VA1: 20/
OU_VA: 20/
OS_VA2: 20/
OD_VA1: 20/
OD_VA2: 20/
OS_VA1: 20/
OU_VA: 20/
OD_VA3: 20/
OD_VA3: 20/
OS_VA1: 20/
OS_VA3: 20/
OS_VA2: 20/
OD_VA1: 20/
OS_VA3: 20/
OU_VA: 20/
OD_VA2: 20/
OD_VA3: 20/
OS_VA3: 20/

## 2018-02-15 ASSESSMENT — VISUAL ACUITY
OS_BCVA: CF 2FT
OD_BCVA: 20/40+2

## 2018-02-26 PROBLEM — C54.1 ENDOMETRIAL CANCER (HCC): Status: ACTIVE | Noted: 2018-02-26

## 2018-03-05 RX ORDER — SYRINGE, DISPOSABLE, 1 ML
SYRINGE, EMPTY DISPOSABLE MISCELLANEOUS
COMMUNITY
Start: 2017-06-05

## 2018-03-05 RX ORDER — RANITIDINE 150 MG/1
1 TABLET ORAL 2 TIMES DAILY
COMMUNITY
Start: 2018-01-16

## 2018-03-05 RX ORDER — LORAZEPAM 0.5 MG/1
1 TABLET ORAL 2 TIMES DAILY PRN
COMMUNITY
Start: 2015-03-12

## 2018-03-05 RX ORDER — ACETAMINOPHEN 500 MG
325 TABLET ORAL AS NEEDED
COMMUNITY

## 2018-03-05 RX ORDER — GABAPENTIN 300 MG/1
1 CAPSULE ORAL 2 TIMES DAILY
COMMUNITY
Start: 2017-02-09

## 2018-03-05 RX ORDER — BUSPIRONE HYDROCHLORIDE 15 MG/1
1 TABLET ORAL 2 TIMES DAILY
COMMUNITY
Start: 2018-01-16

## 2018-04-09 NOTE — ASSESSMENT & PLAN NOTE
This is a 41-year-old with a history of at least stage IIIB FIGO grade 1 endometrial cancer, who underwent the below-mentioned procedure on January 18, 2016  Due to multiple serious medical co-morbidities and poor performance status, full staging was not performed  She completed adjuvant pelvic radiotherapy and vaginal brachytherapy in May 2016  Per Dr Antonietta Wilson, surveillance will be performed at 6 month intervals  On exam today, there was a friable lesion/defect approximately 1cm in size at the left of the vaginal cuff  Area tender on bimanual exam  Biopsy obtained using Tischler instrument and will be sent to pathology for testing  Her performance status is 3  The patient will return to the office in 6 months for continued surveillance, pending results of biopsy and CT scan of the abdomen and pelvis  I will follow up with her regarding results and we will bring her into the office sooner if necessary  She is aware to report any new or concerning symptoms to the office

## 2018-04-10 ENCOUNTER — OFFICE VISIT (OUTPATIENT)
Dept: GYNECOLOGIC ONCOLOGY | Facility: CLINIC | Age: 73
End: 2018-04-10
Payer: MEDICARE

## 2018-04-10 VITALS
HEART RATE: 110 BPM | WEIGHT: 188 LBS | DIASTOLIC BLOOD PRESSURE: 76 MMHG | HEIGHT: 61 IN | RESPIRATION RATE: 16 BRPM | SYSTOLIC BLOOD PRESSURE: 132 MMHG | TEMPERATURE: 98.4 F | BODY MASS INDEX: 35.5 KG/M2

## 2018-04-10 DIAGNOSIS — N89.8 VAGINAL LESION: ICD-10-CM

## 2018-04-10 DIAGNOSIS — C54.1 ENDOMETRIAL CANCER (HCC): Primary | ICD-10-CM

## 2018-04-10 PROCEDURE — 99214 OFFICE O/P EST MOD 30 MIN: CPT | Performed by: NURSE PRACTITIONER

## 2018-04-10 PROCEDURE — 88307 TISSUE EXAM BY PATHOLOGIST: CPT | Performed by: PATHOLOGY

## 2018-04-10 RX ORDER — INSULIN GLARGINE 100 [IU]/ML
INJECTION, SOLUTION SUBCUTANEOUS
COMMUNITY
Start: 2018-04-08

## 2018-04-10 NOTE — PROGRESS NOTES
Assessment/Plan:    Problem List Items Addressed This Visit     Endometrial cancer [facility]) - Primary     This is a 69-year-old with a history of at least stage IIIB FIGO grade 1 endometrial cancer, who underwent the below-mentioned procedure on January 18, 2016  Due to multiple serious medical co-morbidities and poor performance status, full staging was not performed  She completed adjuvant pelvic radiotherapy and vaginal brachytherapy in May 2016  Per Dr Becki Liu, surveillance will be performed at 6 month intervals  On exam today, there was a friable lesion/defect approximately 1cm in size at the left of the vaginal cuff  Area tender on bimanual exam  Biopsy obtained using Tischler instrument and will be sent to pathology for testing  Her performance status is 3  The patient will return to the office in 6 months for continued surveillance, pending results of biopsy and CT scan of the abdomen and pelvis  I will follow up with her regarding results and we will bring her into the office sooner if necessary  She is aware to report any new or concerning symptoms to the office  Relevant Orders    CT abdomen pelvis w contrast    BUN    Creatinine, serum        Vaginal lesion     See endometrial cancer section  Relevant Orders    Tissue Exam              CHIEF COMPLAINT:       Subjective:     Problem:  No matching staging information was found for the patient      Previous therapy:     Endometrial cancer ([facility] [address] )    12/9/2015 Biopsy     FINAL PATHOLOGIC DIAGNOSIS  Reported:  12/14/2015  A   Endometrium ;curetting:      -Foci of well differentiated endometrioid adenocarcinoma with mucinous  metaplasia,  FIGO grade 1 of 3 arising in background of atypical simple and complex endometrial hyperplasia         1/18/2016 Surgery     Robotic assisted total laparoscopic hysterectomy, bilateral salpingo-oophorectomy, ventral hernia repair with bioprosthetic, cystoscopy, lymphadenectomy not performed given patient's performance status, etc, no gross extrauterine disease  Final pathology consistent with 8 3 cm FIGO grade 1 tumor, invasion 12 out of 14 mm, positive lymphovascular space invasion, positive cervical stromal invasion, positive vaginal and parametrial involvement with positive cervicovaginal margin  No gross residual disease           - 5/2016 Radiation     Whole pelvic radiotherapy (3/29-5/3/2016): 4,500 cGy/25 Fx and vaginal brachytherapy (last on 5/26/2016): 1,500 cGy / 3 Fx) completed late May 2016  Patient ID: Lucas Xiao is a 68 y o  female  This is a 68 y o  female last evaluated on September 5, 2017, who presents to the office for endometrial cancer surveillance  Due to multiple medical co-morbidities, surveillance has been Q6 months  She reports being well in the interim and is without acute complaints  She reports having nausea and vomiting occasionally after meals  Her appetite is appropriate  She states that her bowels alternate between constipation and diarrhea  She is incontinent of urine  She denies abdominal or pelvic pain  She denies noticing any vaginal bleeding or discharge  She is wheelchair-bound  Review of Systems   Constitutional: Positive for fatigue  Negative for chills, fever and unexpected weight change  HENT: Negative for nosebleeds  Eyes: Negative  Respiratory: Positive for shortness of breath  Negative for cough, chest tightness and wheezing  SOB with activity   Cardiovascular: Negative for chest pain, palpitations and leg swelling  Gastrointestinal: Positive for constipation, diarrhea, nausea and vomiting  Negative for abdominal distention, abdominal pain, anal bleeding, blood in stool and rectal pain  Endocrine: Negative  Genitourinary: Positive for enuresis  Negative for difficulty urinating, dysuria, frequency, hematuria, pelvic pain, urgency, vaginal bleeding, vaginal discharge and vaginal pain     Musculoskeletal: Positive for arthralgias, gait problem and myalgias  Negative for joint swelling  Skin: Negative for color change, pallor and rash  Neurological: Negative for dizziness, weakness, light-headedness, numbness and headaches  Hematological: Negative  Psychiatric/Behavioral: Negative  Current Outpatient Prescriptions   Medication Sig Dispense Refill    acetaminophen (TYLENOL) 500 mg tablet Take 500 mg by mouth as needed      allopurinol (ZYLOPRIM) 300 mg tablet Take 300 mg by mouth daily   busPIRone (BUSPAR) 15 mg tablet Take 1 tablet by mouth 2 (two) times a day      DULoxetine (CYMBALTA) 60 mg delayed release capsule Take 30 mg by mouth daily        gabapentin (NEURONTIN) 300 mg capsule Take 1 capsule by mouth 2 (two) times a day      glipiZIDE (GLUCOTROL) 5 mg tablet Take 5 mg by mouth 2 (two) times a day before meals      insulin detemir (LEVEMIR FLEXTOUCH) 100 Units/mL subcutaneous injection pen Inject 45 Units under the skin        insulin glargine (LANTUS SOLOSTAR) injection pen 100 units/mL Inject under the skin      insulin lispro (HumaLOG) 100 units/mL injection Inject 8 Units under the skin 3 (three) times a day before meals        Insulin Pen Needle (BD PEN NEEDLE AMANDA U/F) 32G X 4 MM MISC by Does not apply route 4 (four) times a day      LANTUS 100 UNIT/ML subcutaneous injection       LORazepam (ATIVAN) 0 5 mg tablet Take 1 tablet by mouth 2 (two) times a day as needed      losartan (COZAAR) 100 MG tablet Take 100 mg by mouth daily   Multiple Vitamin (MULTIVITAMIN) tablet Take 1 tablet by mouth daily        ranitidine (ZANTAC) 150 mg tablet Take 1 tablet by mouth 2 (two) times a day      Syringe, Disposable, (30CC SYRINGE) 30 ML MISC by Does not apply route      aspirin (ECOTRIN LOW STRENGTH) 81 mg EC tablet Take 4 tablets by mouth daily for 30 days 120 tablet 0    atorvastatin (LIPITOR) 80 mg tablet Take 1 tablet by mouth every evening for 30 days 30 tablet 0    clopidogrel (PLAVIX) 75 mg tablet Take 1 tablet by mouth daily for 30 days 30 tablet 0    furosemide (LASIX) 40 mg tablet Take 1 tablet by mouth daily for 30 days 30 tablet 0    metoprolol succinate (TOPROL-XL) 50 mg 24 hr tablet Take 1 tablet by mouth daily for 30 days 30 tablet 0    potassium chloride (K-DUR,KLOR-CON) 20 mEq tablet Take 1 tablet by mouth daily for 30 days 30 tablet 0     No current facility-administered medications for this visit  Allergies   Allergen Reactions    Promethazine Rash    Sulfa Antibiotics Rash       Past Medical History:   Diagnosis Date    Anxiety     Cancer [facility])     uterine    [facility]congest[facility]) ([facility])     Chronic pain     Diabetes mellitus ([facility] [address] )     Gout     Gout     Hyperlipidemia     Hypertension     Psychiatric disorder     anxiety and depression    TIA (transient ischemic attack)        Past Surgical History:   Procedure Laterality Date    BILATERAL SALPINGOOPHORECTOMY Bilateral 1/18/2016    Procedure: SALPINGO-OOPHORECTOMY- ROBOTIC ;  Surgeon: Vicenta Duggan MD;  [facility];  Service:    [facility] CHOLECYSTECTOMY      CYSTOSCOPY N/A 1/18/2016    Procedure: Todd Saldivar;  Surgeon: Vicenta Duggan MD;  [facility];  Service:     DILATION AND CURETTAGE OF UTERUS      DILATION AND CURETTAGE OF UTERUS N/A 1/18/2016    Procedure: DILATATION AND CURETTAGE (D&C)- vaginal cuff repair;  Surgeon: Vicenta Duggan MD;  [facility];  Service:     HYSTERECTOMY N/A 1/18/2016    Procedure: HYSTERECTOMY LAPAROSCOPIC TOTAL ([facility] [address] [place] [address]) W/ ROBOTICS;  Surgeon: Vicenta Duggan MD;  [facility];  Service:     TUBAL LIGATION      UTERINE FIBROID SURGERY      VENTRAL HERNIA REPAIR N/A 1/18/2016    Procedure: REPAIR HERNIA VENTRAL- POSSIBLE MESH;  Surgeon: Vicenta Duggan MD;  [facility];  Service:        OB History     No data available          No family history on file      The following portions of the patient's history were reviewed and updated as appropriate: allergies, current medications, past family history, past medical history, past social history, past surgical history and problem list       Objective:    Blood pressure 132/76, pulse (!) 110, temperature 98 4 °F (36 9 °C), temperature source Oral, resp  rate 16, height 5' 1" (1 549 m), weight 85 3 kg (188 lb)  Body mass index is 35 52 kg/m²  Physical Exam   Constitutional: She is oriented to person, place, and time  She appears well-developed and well-nourished  No distress  HENT:   Head: Normocephalic and atraumatic  Eyes: Right eye exhibits no discharge  Left eye exhibits no discharge  Neck: Normal range of motion  Neck supple  Cardiovascular: Normal rate, regular rhythm and normal heart sounds  Pulmonary/Chest: Effort normal and breath sounds normal  No respiratory distress  Abdominal: Soft  She exhibits no distension and no mass  There is no tenderness  There is no guarding  Obese   Genitourinary: Vagina normal  No vaginal discharge found  Genitourinary Comments: External genitalia without abnormality  On speculum exam, there appears to be a lesion/defect at the left of the vaginal cuff, approximately 1cm in size  This area is friable  Biopsy obtained using [employer] instrument  On digital exam, the 1cm area from which the biopsy was obtained feels indented and firm, and is tender per the pt  Uterus, adnexa, and cervix are surgically absent  There is no nodularity of the rectovaginal septum on rectovaginal exam    Musculoskeletal:   Limited ROM   Lymphadenopathy:     She has no cervical adenopathy  Neurological: She is alert and oriented to person, place, and time  No cranial nerve deficit  Skin: Skin is warm and dry  No rash noted  She is not diaphoretic  No erythema  No pallor  Psychiatric: She has a normal mood and affect   Her behavior is normal  Judgment and thought content normal          Lab Results   Component Value Date     6 5 01/04/2016     Lab Results   Component Value Date    WBC 5 93 05/04/2017    HGB 12 3 05/04/2017    HCT 37 8 05/04/2017    MCV 95 05/04/2017     05/04/2017     Lab Results   Component Value Date     05/04/2017    K 4 2 05/04/2017    CL 99 (L) 05/04/2017    CO2 28 05/04/2017    ANIONGAP 9 05/04/2017    BUN 21 05/04/2017    CREATININE 0 76 05/04/2017    GLUCOSE 75 01/20/2017    GLUF 391 (H) 05/04/2017    CALCIUM 9 4 05/04/2017    AST 18 05/04/2017    ALT 39 05/04/2017    ALKPHOS 147 (H) 05/04/2017    PROT 7 1 05/04/2017    BILITOT 0 29 05/04/2017    EGFR >60 0 05/04/2017

## 2018-04-10 NOTE — PATIENT INSTRUCTIONS
1  Await final pathology report from biopsy  2  CT scan abdomen/pelvis  3  6 month follow-up, pending biopsy and CT results  4  Report any new or concerning symptoms to the office

## 2018-04-11 NOTE — PROGRESS NOTES
Assessment/Plan:    Problem List Items Addressed This Visit     Endometrial cancer [facility]) - Primary     This is a 61-year-old with a history of at least stage IIIB FIGO grade 1 endometrial cancer, who underwent the below-mentioned procedure on January 18, 2016  Due to multiple serious medical co-morbidities and poor performance status, full staging was not performed  She completed adjuvant pelvic radiotherapy and vaginal brachytherapy in May 2016  Per Dr Davin Sargent, surveillance will be performed at 6 month intervals  On exam today, there was a friable lesion/defect approximately 1cm in size at the left of the vaginal cuff  Area tender on bimanual exam  Biopsy obtained using Tischler instrument and will be sent to pathology for testing  Her performance status is 3  The patient will return to the office in 6 months for continued surveillance, pending results of biopsy and CT scan of the abdomen and pelvis  I will follow up with her regarding results and we will bring her into the office sooner if necessary  She is aware to report any new or concerning symptoms to the office  Relevant Orders    CT abdomen pelvis w contrast    BUN    Creatinine, serum        Vaginal lesion     See endometrial cancer section           Relevant Orders    Tissue Exam              CHIEF COMPLAINT: Endometrial cancer surveillance      Subjective:     Problem:  History of stage IIIB endometrial cancer    Previous therapy:     Endometrial cancer ([facility] [address] )    12/9/2015 Biopsy     FINAL PATHOLOGIC DIAGNOSIS  Reported:  12/14/2015  A   Endometrium ;curetting:      -Foci of well differentiated endometrioid adenocarcinoma with mucinous  metaplasia,  FIGO grade 1 of 3 arising in background of atypical simple and complex endometrial hyperplasia         1/18/2016 Surgery     Robotic assisted total laparoscopic hysterectomy, bilateral salpingo-oophorectomy, ventral hernia repair with bioprosthetic, cystoscopy, lymphadenectomy not performed given patient's performance status, etc, no gross extrauterine disease  Final pathology consistent with 8 3 cm FIGO grade 1 tumor, invasion 12 out of 14 mm, positive lymphovascular space invasion, positive cervical stromal invasion, positive vaginal and parametrial involvement with positive cervicovaginal margin  No gross residual disease           - 5/2016 Radiation     Whole pelvic radiotherapy (3/29-5/3/2016): 4,500 cGy/25 Fx and vaginal brachytherapy (last on 5/26/2016): 1,500 cGy / 3 Fx) completed late May 2016  Patient ID: Queen Maria Elena is a 68 y o  female  This is a 68 y o  female last evaluated on September 5, 2017, who presents to the office for endometrial cancer surveillance  Due to multiple medical co-morbidities, surveillance has been Q6 months  She reports being well in the interim and is without acute complaints  She reports having nausea and vomiting occasionally after meals  Her appetite is appropriate  She states that her bowels alternate between constipation and diarrhea  She is incontinent of urine  She denies abdominal or pelvic pain  She denies noticing any vaginal bleeding or discharge  She is wheelchair-bound  Review of Systems   Constitutional: Positive for fatigue  Negative for chills, fever and unexpected weight change  HENT: Negative for nosebleeds  Eyes: Negative  Respiratory: Positive for shortness of breath  Negative for cough, chest tightness and wheezing  SOB with activity   Cardiovascular: Negative for chest pain, palpitations and leg swelling  Gastrointestinal: Positive for constipation, diarrhea, nausea and vomiting  Negative for abdominal distention, abdominal pain, anal bleeding, blood in stool and rectal pain  Endocrine: Negative  Genitourinary: Positive for enuresis  Negative for difficulty urinating, dysuria, frequency, hematuria, pelvic pain, urgency, vaginal bleeding, vaginal discharge and vaginal pain  Musculoskeletal: Positive for arthralgias, gait problem and myalgias  Negative for joint swelling  Skin: Negative for color change, pallor and rash  Neurological: Negative for dizziness, weakness, light-headedness, numbness and headaches  Hematological: Negative  Psychiatric/Behavioral: Negative  Current Outpatient Prescriptions   Medication Sig Dispense Refill    acetaminophen (TYLENOL) 500 mg tablet Take 500 mg by mouth as needed      allopurinol (ZYLOPRIM) 300 mg tablet Take 300 mg by mouth daily   busPIRone (BUSPAR) 15 mg tablet Take 1 tablet by mouth 2 (two) times a day      DULoxetine (CYMBALTA) 60 mg delayed release capsule Take 30 mg by mouth daily        gabapentin (NEURONTIN) 300 mg capsule Take 1 capsule by mouth 2 (two) times a day      glipiZIDE (GLUCOTROL) 5 mg tablet Take 5 mg by mouth 2 (two) times a day before meals      insulin detemir (LEVEMIR FLEXTOUCH) 100 Units/mL subcutaneous injection pen Inject 45 Units under the skin        insulin glargine (LANTUS SOLOSTAR) injection pen 100 units/mL Inject under the skin      insulin lispro (HumaLOG) 100 units/mL injection Inject 8 Units under the skin 3 (three) times a day before meals        Insulin Pen Needle (BD PEN NEEDLE AMANDA U/F) 32G X 4 MM MISC by Does not apply route 4 (four) times a day      LANTUS 100 UNIT/ML subcutaneous injection       LORazepam (ATIVAN) 0 5 mg tablet Take 1 tablet by mouth 2 (two) times a day as needed      losartan (COZAAR) 100 MG tablet Take 100 mg by mouth daily   Multiple Vitamin (MULTIVITAMIN) tablet Take 1 tablet by mouth daily        ranitidine (ZANTAC) 150 mg tablet Take 1 tablet by mouth 2 (two) times a day      Syringe, Disposable, (30CC SYRINGE) 30 ML MISC by Does not apply route      aspirin (ECOTRIN LOW STRENGTH) 81 mg EC tablet Take 4 tablets by mouth daily for 30 days 120 tablet 0    atorvastatin (LIPITOR) 80 mg tablet Take 1 tablet by mouth every evening for 30 days 30 tablet 0    clopidogrel (PLAVIX) 75 mg tablet Take 1 tablet by mouth daily for 30 days 30 tablet 0    furosemide (LASIX) 40 mg tablet Take 1 tablet by mouth daily for 30 days 30 tablet 0    metoprolol succinate (TOPROL-XL) 50 mg 24 hr tablet Take 1 tablet by mouth daily for 30 days 30 tablet 0    potassium chloride (K-DUR,KLOR-CON) 20 mEq tablet Take 1 tablet by mouth daily for 30 days 30 tablet 0     No current facility-administered medications for this visit  Allergies   Allergen Reactions    Promethazine Rash    Sulfa Antibiotics Rash       Past Medical History:   Diagnosis Date    Anxiety     Cancer [facility])     uterine    [facility]est[facility]) ([facility])     Chronic pain     Diabetes mellitus ([facility] [address] [facility]     Gout     Gout     Hyperlipidemia     Hypertension     Psychiatric disorder     anxiety and depression    TIA (transient ischemic attack)        Past Surgical History:   Procedure Laterality Date    BILATERAL SALPINGOOPHORECTOMY Bilateral 1/18/2016    Procedure: SALPINGO-OOPHORECTOMY- ROBOTIC ;  Surgeon: Korin Dyer MD;  [facility];  Service:    [facility] CHOLECYSTECTOMY      CYSTOSCOPY N/A 1/18/2016    Procedure: Rannie Hard;  Surgeon: Korin Dyer MD;  [facility];  Service:     DILATION AND CURETTAGE OF UTERUS      DILATION AND CURETTAGE OF UTERUS N/A 1/18/2016    Procedure: DILATATION AND CURETTAGE (D&C)- vaginal cuff repair;  Surgeon: Korin Dyer MD;  [facility];  Service:     HYSTERECTOMY N/A 1/18/2016    Procedure: HYSTERECTOMY LAPAROSCOPIC TOTAL ([address] [place] [address]) W/ ROBOTICS;  Surgeon: Korin Dyer MD;  [facility];  Service:     TUBAL LIGATION      UTERINE FIBROID SURGERY      VENTRAL HERNIA REPAIR N/A 1/18/2016    Procedure: REPAIR HERNIA VENTRAL- POSSIBLE MESH;  Surgeon: Korin Dyer MD;  [facility];  Service:        OB History     No data available          No family history on file      The following portions of the patient's history were reviewed and updated as appropriate: allergies, current medications, past family history, past medical history, past social history, past surgical history and problem list       Objective:    Blood pressure 132/76, pulse (!) 110, temperature 98 4 °F (36 9 °C), temperature source Oral, resp  rate 16, height 5' 1" (1 549 m), weight 85 3 kg (188 lb)  Body mass index is 35 52 kg/m²  Physical Exam   Constitutional: She is oriented to person, place, and time  She appears well-developed and well-nourished  No distress  HENT:   Head: Normocephalic and atraumatic  Eyes: Right eye exhibits no discharge  Left eye exhibits no discharge  Neck: Normal range of motion  Neck supple  Cardiovascular: Normal rate, regular rhythm and normal heart sounds  Pulmonary/Chest: Effort normal and breath sounds normal  No respiratory distress  Abdominal: Soft  She exhibits no distension and no mass  There is no tenderness  There is no guarding  Obese   Genitourinary: Vagina normal  No vaginal discharge found  Genitourinary Comments: External genitalia without abnormality  On speculum exam, there appears to be a lesion/defect at the left of the vaginal cuff, approximately 1cm in size  This area is friable  Biopsy obtained using [employer] instrument  On digital exam, the 1cm area from which the biopsy was obtained feels indented and firm, and is tender per the pt  Uterus, adnexa, and cervix are surgically absent  There is no nodularity of the rectovaginal septum on rectovaginal exam    Musculoskeletal:   Limited ROM   Lymphadenopathy:     She has no cervical adenopathy  Neurological: She is alert and oriented to person, place, and time  No cranial nerve deficit  Skin: Skin is warm and dry  No rash noted  She is not diaphoretic  No erythema  No pallor  Psychiatric: She has a normal mood and affect   Her behavior is normal  Judgment and thought content normal          Lab Results   Component Value Date     6 5 01/04/2016     Lab Results   Component Value Date    WBC 5 93 05/04/2017    HGB 12 3 05/04/2017    HCT 37 8 05/04/2017    MCV 95 05/04/2017     05/04/2017     Lab Results   Component Value Date     05/04/2017    K 4 2 05/04/2017    CL 99 (L) 05/04/2017    CO2 28 05/04/2017    ANIONGAP 9 05/04/2017    BUN 21 05/04/2017    CREATININE 0 76 05/04/2017    GLUCOSE 75 01/20/2017    GLUF 391 (H) 05/04/2017    CALCIUM 9 4 05/04/2017    AST 18 05/04/2017    ALT 39 05/04/2017    ALKPHOS 147 (H) 05/04/2017    PROT 7 1 05/04/2017    BILITOT 0 29 05/04/2017    EGFR >60 0 05/04/2017

## 2018-04-24 LAB — HBA1C MFR BLD HPLC: 10 %

## 2018-04-25 DIAGNOSIS — C54.1 ENDOMETRIAL CANCER (HCC): Primary | ICD-10-CM

## 2018-05-01 ENCOUNTER — HOSPITAL ENCOUNTER (OUTPATIENT)
Dept: CT IMAGING | Facility: HOSPITAL | Age: 73
Discharge: HOME | End: 2018-05-01
Payer: MEDICARE

## 2018-05-01 DIAGNOSIS — C54.1 ENDOMETRIAL CANCER (HCC): ICD-10-CM

## 2018-05-01 PROCEDURE — 71260 CT THORAX DX C+: CPT

## 2018-05-01 PROCEDURE — 74177 CT ABD & PELVIS W/CONTRAST: CPT

## 2018-05-01 RX ADMIN — IOHEXOL 100 ML: 350 INJECTION, SOLUTION INTRAVENOUS at 16:31

## 2018-05-11 ENCOUNTER — TELEPHONE (OUTPATIENT)
Dept: GYNECOLOGIC ONCOLOGY | Facility: CLINIC | Age: 73
End: 2018-05-11

## 2018-05-11 NOTE — TELEPHONE ENCOUNTER
Per [facility], patient missed office visit last Tuesday, 5/8/2018, due to a transportation issue - her son, Hari Arana, was to provide transportation to her appointment, but he did not pick her up  She was rescheduled for 5/15  A message was left yesterday and today at Dendron's provided phone number by  Tasha Turcios, notifying him of rescheduled appointment  She also spoke with the patient's nurse, Arielle Alvarez, this morning confirming the patient's appointment for 5/15 with Dr Nechama Koyanagi

## 2018-05-15 ENCOUNTER — OFFICE VISIT (OUTPATIENT)
Dept: GYNECOLOGIC ONCOLOGY | Facility: CLINIC | Age: 73
End: 2018-05-15
Payer: MEDICARE

## 2018-05-15 VITALS — DIASTOLIC BLOOD PRESSURE: 82 MMHG | HEART RATE: 106 BPM | SYSTOLIC BLOOD PRESSURE: 146 MMHG

## 2018-05-15 DIAGNOSIS — C54.1 ENDOMETRIAL CANCER (HCC): Primary | ICD-10-CM

## 2018-05-15 DIAGNOSIS — N89.8 VAGINAL LESION: ICD-10-CM

## 2018-05-15 PROBLEM — I50.32 CHRONIC DIASTOLIC CHF (CONGESTIVE HEART FAILURE) (HCC): Status: ACTIVE | Noted: 2017-01-26

## 2018-05-15 PROBLEM — I50.9 ACUTE CONGESTIVE HEART FAILURE (HCC): Status: RESOLVED | Noted: 2017-01-15 | Resolved: 2018-05-15

## 2018-05-15 PROBLEM — J18.9 COMMUNITY ACQUIRED PNEUMONIA: Status: RESOLVED | Noted: 2017-01-12 | Resolved: 2018-05-15

## 2018-05-15 PROCEDURE — 99214 OFFICE O/P EST MOD 30 MIN: CPT | Performed by: OBSTETRICS & GYNECOLOGY

## 2018-05-15 NOTE — PROGRESS NOTES
Assessment/Plan:    Problem List Items Addressed This Visit        Genitourinary    Endometrial cancer ([facility] [address] ) - Primary     -   Now with CT scan and clinical evidence of at least vaginal cuff recurrence  Biopsy-proven  There is questionable liver metastases  Management options with defer depending on whether this is an isolated vaginal cuff lesion versus widely metastatic/ widespread recurrence  We have agreed on obtaining a PET-C T  Patient seems interested in pursuing treatment  She is not a candidate for ultra radical pelvic surgery, if disease was localized/ isolated to vaginal cuff I would recommend referral to [facility] for consideration of interstitial brachytherapy  I will see her in the office with results from PET-CT  Relevant Orders    NM PET CT skull base to mid thigh       Other    Vaginal lesion     -   Vaginal  Biopsy proven endometrial cancer recurrence  See above  Relevant Orders    NM PET CT skull base to mid thigh            CHIEF COMPLAINT:       Presents to discuss the results of recent biopsy and CT scan  Previous therapy:     Endometrial cancer ([facility] [address] )    12/9/2015 Biopsy     FINAL PATHOLOGIC DIAGNOSIS  Reported:  12/14/2015  A   Endometrium ;curetting:      -Foci of well differentiated endometrioid adenocarcinoma with mucinous  metaplasia,  FIGO grade 1 of 3 arising in background of atypical simple and complex endometrial hyperplasia         1/18/2016 Surgery     Robotic assisted total laparoscopic hysterectomy, bilateral salpingo-oophorectomy, ventral hernia repair with bioprosthetic, cystoscopy, lymphadenectomy not performed given patient's performance status, etc, no gross extrauterine disease  Final pathology consistent with 8 3 cm FIGO grade 1 tumor, invasion 12 out of 14 mm, positive lymphovascular space invasion, positive cervical stromal invasion, positive vaginal and parametrial involvement with positive cervicovaginal margin   No gross residual disease           - 5/2016 Radiation     Whole pelvic radiotherapy (3/29-5/3/2016): 4,500 cGy/25 Fx and vaginal brachytherapy (last on 5/26/2016): 1,500 cGy / 3 Fx) completed late May 2016           4/10/2018 Progression     On surveillance exam, there was a friable lesion/defect approximately 1cm in size at the left of the vaginal cuff  Area tender on bimanual exam  Biopsy obtained using Tischler instrument and sent to pathology for testing  Pathology confirmed recurrence of patient's known endometrial adenocarcinoma  Patient ID: Ami Doran is a 68 y o  female  HPI    Patient was noted to have abnormal appearance of vaginal cuff at last office visit  Biopsy was obtained  This confirmed adenocarcinoma  CT scan of the chest abdomen pelvis was obtained  This confirmed vaginal thickening as well as questionable new liver lesions concerning for possible metastatic disease  She is here to discuss results  She is mostly asymptomatic  The following portions of the patient's history were reviewed and updated as appropriate: allergies, current medications, past family history, past medical history, past social history, past surgical history and problem list     Review of Systems    Wheelchair-bound  Short of breath on exertion  Denies vaginal bleeding  However, there is evidence of spotting on patient's the pants  Reports ongoing diarrhea which has been consistent and unchanged for many years  Current Outpatient Prescriptions   Medication Sig Dispense Refill    acetaminophen (TYLENOL) 500 mg tablet Take 500 mg by mouth as needed      allopurinol (ZYLOPRIM) 300 mg tablet Take 300 mg by mouth daily        aspirin (ECOTRIN LOW STRENGTH) 81 mg EC tablet Take 4 tablets by mouth daily for 30 days 120 tablet 0    busPIRone (BUSPAR) 15 mg tablet Take 1 tablet by mouth 2 (two) times a day      clopidogrel (PLAVIX) 75 mg tablet Take 1 tablet by mouth daily for 30 days 30 tablet 0    DULoxetine (CYMBALTA) 60 mg delayed release capsule Take 30 mg by mouth daily        furosemide (LASIX) 40 mg tablet Take 1 tablet by mouth daily for 30 days 30 tablet 0    gabapentin (NEURONTIN) 300 mg capsule Take 1 capsule by mouth 2 (two) times a day      glipiZIDE (GLUCOTROL) 5 mg tablet Take 5 mg by mouth 2 (two) times a day before meals      insulin detemir (LEVEMIR FLEXTOUCH) 100 Units/mL subcutaneous injection pen Inject 45 Units under the skin        insulin glargine (LANTUS SOLOSTAR) injection pen 100 units/mL Inject under the skin      insulin lispro (HumaLOG) 100 units/mL injection Inject 8 Units under the skin 3 (three) times a day before meals        Insulin Pen Needle (BD PEN NEEDLE AMANDA U/F) 32G X 4 MM MISC by Does not apply route 4 (four) times a day      LANTUS 100 UNIT/ML subcutaneous injection       LORazepam (ATIVAN) 0 5 mg tablet Take 1 tablet by mouth 2 (two) times a day as needed      losartan (COZAAR) 100 MG tablet Take 100 mg by mouth daily   metoprolol succinate (TOPROL-XL) 50 mg 24 hr tablet Take 1 tablet by mouth daily for 30 days 30 tablet 0    Multiple Vitamin (MULTIVITAMIN) tablet Take 1 tablet by mouth daily   potassium chloride (K-DUR,KLOR-CON) 20 mEq tablet Take 1 tablet by mouth daily for 30 days 30 tablet 0    ranitidine (ZANTAC) 150 mg tablet Take 1 tablet by mouth 2 (two) times a day      Syringe, Disposable, (30CC SYRINGE) 30 ML MISC by Does not apply route       No current facility-administered medications for this visit  Objective:    Blood pressure 146/82, pulse (!) 106  There is no height or weight on file to calculate BMI  There is no height or weight on file to calculate BSA  Physical Exam   Constitutional: She appears well-developed and well-nourished  Eyes: No scleral icterus  Cardiovascular: Normal rate and regular rhythm  Murmur ( 2/6 systolic murmur) heard  Pulmonary/Chest: Effort normal  No respiratory distress  Rales: Diffuse discontinuous rales  Abdominal: Soft  Bowel sounds are normal  She exhibits distension  She exhibits no mass  There is tenderness  There is no rebound and no guarding  Genitourinary:   Genitourinary Comments:  Normal genitalia  Vulvovaginal atrophy  Top of the vagina demonstrates small area of ulceration and papillary appearance  Biopsy from this area has demonstrated adenocarcinoma consistent with endometrial primary  Vitals reviewed  5/1/2018 CT CHEST, ABDOMEN AND PELVIS WITH IV CONTRAST     INDICATION:   History of endometrial cancer, evaluate for metastasis      COMPARISON: 1/12/2017, 9/15/2016, and 3/3/2016     TECHNIQUE: CT examination of the chest, abdomen and pelvis was performed  Axial, sagittal, and coronal 2D reformatted images were created from the source data and submitted for interpretation      Radiation dose length product (DLP) for this visit:  1760 mGy-cm     This examination, like all CT scans performed in the [facility], was performed utilizing techniques to minimize radiation dose exposure, including the use of iterative   reconstruction and automated exposure control      IV Contrast:  100 mL of iohexol (OMNIPAQUE)  Enteric Contrast: Enteric contrast was administered      FINDINGS:     CHEST     LUNGS:     PLEURA:  Unremarkable      HEART/GREAT VESSELS:  Unremarkable for patient's age      MEDIASTINUM AND FARAZ:  Unremarkable      CHEST WALL AND LOWER NECK: Incidental discovery of one or more thyroid nodule(s) measuring less than 1 5 cm and without suspicious features is noted in this patient who is above 28years old; according to guidelines published in the February 2015 white   paper on incidental thyroid nodules in the Journal of the [employer] of Radiology Gurpreet Clink), no further evaluation is recommended       ABDOMEN     LIVER/BILIARY TREE:  There is hypodensity in the lateral segment of the left hepatic lobe measuring 1 5 cm and in the posterior right lobe measuring 1 5 cm and 1 3 cm  These are somewhat ill-defined and may represent foci of fat though given the history   of primary malignancy, metastasis cannot be entirely excluded  These were not present previously      GALLBLADDER:  Gallbladder is surgically absent      SPLEEN:  Unremarkable      PANCREAS:  Unremarkable      ADRENAL GLANDS:  Unremarkable      KIDNEYS/URETERS:  There is a 3 mm nonobstructing right renal calculus  There is a punctate nonobstructing calculus on the left  One or more sharply circumscribed subcentimeter renal hypodensities are noted  These lesions are too small to accurately   characterize, but are statistically most likely to represent benign cortical renal cyst(s)  According to the guidelines published in the [facility] Paper of the ACR Incidental Findings Committee (Radiology 2010), no further workup of these lesions is   recommended  No hydronephrosis      STOMACH AND BOWEL:  Unremarkable      APPENDIX:  No findings to suggest appendicitis      ABDOMINOPELVIC CAVITY:  No ascites or free intraperitoneal air  No lymphadenopathy      VESSELS:  Unremarkable for patient's age      PELVIS     REPRODUCTIVE ORGANS:  The vaginal cuff appears asymmetric, thickened and hyperdense on the left  This was not present previously and may be related to a small amount of fluid present within the left aspect of the vaginal cuff      URINARY BLADDER:  Unremarkable      ABDOMINAL WALL/INGUINAL REGIONS:  Unremarkable      OSSEOUS STRUCTURES:  No acute fracture or destructive osseous lesion      IMPRESSION:     1  No evidence of metastatic disease in the chest      2  New thickening and hyperdensity of the left vaginal cuff with mild fluid  This can be further evaluated with PET/CT to exclude recurrence/metastatic tumor      3   Ill-defined hypodense foci in the liver, not present previously  While this may represent focal fat, metastasis not excluded    This can also be further evaluated with PET/CT versus liver MRI      4   Small nonobstructing renal calculi       Workstation performed: [id number]  ------------------------------------------------------------------------------------------------------    Paco Wilson MD, FACOG, FACS  5/15/2018  5:12 PM

## 2018-05-15 NOTE — ASSESSMENT & PLAN NOTE
-   Now with CT scan and clinical evidence of at least vaginal cuff recurrence  Biopsy-proven  There is questionable liver metastases  Management options with defer depending on whether this is an isolated vaginal cuff lesion versus widely metastatic/ widespread recurrence  We have agreed on obtaining a PET-C T  Patient seems interested in pursuing treatment  She is not a candidate for ultra radical pelvic surgery, if disease was localized/ isolated to vaginal cuff I would recommend referral to [facility] for consideration of interstitial brachytherapy  I will see her in the office with results from PET-CT

## 2018-05-23 ENCOUNTER — TELEPHONE (OUTPATIENT)
Dept: GYNECOLOGIC ONCOLOGY | Facility: CLINIC | Age: 73
End: 2018-05-23

## 2018-05-23 NOTE — TELEPHONE ENCOUNTER
Received call from PET scan department, where patient was scheduled to have her PET scan today  They cancelled her test as her blood sugar is too high (190)  I spoke with nurse Jesica Del Cid at [address] her of need for short interval follow-up with patient's endocrinologist or PCP to make adjustments in her diabetes medications  Jesica Del Cid states that the patient's regimen was recently adjusted approximately last week  Our office will reach out to [employer] and to the patient's son to reschedule PET/CT appointment

## 2018-05-31 ENCOUNTER — TELEPHONE (OUTPATIENT)
Dept: GYNECOLOGIC ONCOLOGY | Facility: CLINIC | Age: 73
End: 2018-05-31

## 2018-05-31 NOTE — TELEPHONE ENCOUNTER
Patient's PET scan was again cancelled yesterday due to hyperglycemia (blood glucose 245)  PET scan staff explained that patient will need to have documented improved blood sugar control before she is scheduled again  I spoke with patient's endocrinology office today and scheduled her for a follow-up appointment on 6/11/18 (earliest available) at 0830  Message left with patient's son  I also called [facility] to let the nurse know of the new appointment date and time  The nurse informed me that her Metformin dose was increased as of today by physician covering the nursing home  Appointment with Dr Diane Pulliam on 6/5 will be rescheduled, pending PET CT

## 2018-06-11 ENCOUNTER — OFFICE VISIT (OUTPATIENT)
Dept: ENDOCRINOLOGY | Facility: HOSPITAL | Age: 73
End: 2018-06-11
Payer: MEDICARE

## 2018-06-11 VITALS — HEIGHT: 61 IN | SYSTOLIC BLOOD PRESSURE: 152 MMHG | HEART RATE: 84 BPM | DIASTOLIC BLOOD PRESSURE: 84 MMHG

## 2018-06-11 DIAGNOSIS — E11.65 TYPE 2 DIABETES MELLITUS WITH HYPERGLYCEMIA, WITH LONG-TERM CURRENT USE OF INSULIN (HCC): Primary | ICD-10-CM

## 2018-06-11 DIAGNOSIS — Z79.4 TYPE 2 DIABETES MELLITUS WITH HYPERGLYCEMIA, WITH LONG-TERM CURRENT USE OF INSULIN (HCC): Primary | ICD-10-CM

## 2018-06-11 DIAGNOSIS — E78.2 MIXED HYPERLIPIDEMIA: ICD-10-CM

## 2018-06-11 DIAGNOSIS — I10 ESSENTIAL HYPERTENSION: ICD-10-CM

## 2018-06-11 PROCEDURE — 99214 OFFICE O/P EST MOD 30 MIN: CPT | Performed by: INTERNAL MEDICINE

## 2018-06-11 RX ORDER — DIVALPROEX SODIUM 125 MG/1
250 TABLET, DELAYED RELEASE ORAL EVERY 12 HOURS SCHEDULED
COMMUNITY

## 2018-06-11 RX ORDER — ATORVASTATIN CALCIUM 80 MG/1
80 TABLET, FILM COATED ORAL DAILY
COMMUNITY

## 2018-06-11 RX ORDER — OMEPRAZOLE 20 MG/1
20 CAPSULE, DELAYED RELEASE ORAL DAILY
COMMUNITY

## 2018-06-11 RX ORDER — ASPIRIN 325 MG
325 TABLET ORAL DAILY
COMMUNITY

## 2018-06-11 RX ORDER — LOPERAMIDE HYDROCHLORIDE 2 MG/1
2 CAPSULE ORAL
COMMUNITY

## 2018-06-11 RX ORDER — POLYETHYLENE GLYCOL 3350 17 G/17G
17 POWDER, FOR SOLUTION ORAL DAILY
COMMUNITY

## 2018-06-11 RX ORDER — METOPROLOL SUCCINATE 50 MG/1
50 TABLET, EXTENDED RELEASE ORAL DAILY
Qty: 30 TABLET | Refills: 0
Start: 2018-06-11 | End: 2018-07-11

## 2018-06-11 RX ORDER — LANOLIN ALCOHOL/MO/W.PET/CERES
3 CREAM (GRAM) TOPICAL
COMMUNITY

## 2018-06-11 RX ORDER — CALCIUM CARBONATE 200(500)MG
1 TABLET,CHEWABLE ORAL DAILY
COMMUNITY

## 2018-06-11 NOTE — LETTER
June 11, 2018     Jesus Schwartz DO  [address]  [address]  [facility]    Patient: Petra Ham   YOB: 1945   Date of Visit: 6/11/2018       Dear Dr Shiva Albrecht: Thank you for referring Petra Ham to me for evaluation  Below are my notes for this consultation  If you have questions, please do not hesitate to call me  I look forward to following your patient along with you  Sincerely,        Jeff Atkinson DO        CC: No Recipients  Jeff Atkinson DO  6/11/2018 10:20 AM  Sign at close encounter  6/11/2018    Assessment/Plan      Diagnoses and all orders for this visit:    Type 2 diabetes mellitus with hyperglycemia, with long-term current use of insulin (HCC)  -     HEMOGLOBIN A1C W/ EAG ESTIMATION Lab Collect; Future  -     Comprehensive metabolic panel Lab Collect; Future  -     Microalbumin / creatinine urine ratio- Lab Collect; Future  -     insulin glargine (LANTUS SOLOSTAR) 100 units/mL injection pen; 45 units bid   -     insulin aspart (NOVOLOG) 100 units/mL injection; 28 units ac plus 2:50>150  Essential hypertension  -     metoprolol succinate (TOPROL-XL) 50 mg 24 hr tablet; Take 1 tablet (50 mg total) by mouth daily for 30 days    Mixed hyperlipidemia  -     Lipid Panel with Direct LDL reflex Lab Collect; Future    Other orders  -     aspirin 325 mg tablet; Take 325 mg by mouth daily  -     atorvastatin (LIPITOR) 80 mg tablet; Take 80 mg by mouth daily  -     divalproex sodium (DEPAKOTE) 125 mg EC tablet; Take 250 mg by mouth every 12 (twelve) hours  -     melatonin 3 mg; Take 3 mg by mouth daily at bedtime  -     omeprazole (PriLOSEC) 20 mg delayed release capsule; Take 20 mg by mouth daily  -     metFORMIN (GLUCOPHAGE) 1000 MG tablet; Take 1,000 mg by mouth 2 (two) times a day with meals  -     Discontinue: insulin aspart (NOVOLOG) 100 units/mL injection;  Inject 26 Units under the skin 3 (three) times a day before meals  -     loperamide (IMODIUM) 2 mg capsule; Take 2 mg by mouth  -     calcium carbonate (TUMS) 500 mg chewable tablet; Chew 1 tablet daily  -     polyethylene glycol (MIRALAX) 17 g packet; Take 17 g by mouth daily  -     magnesium hydroxide (MILK OF MAGNESIA) 400 mg/5 mL oral suspension; Take by mouth daily as needed for constipation        Assessment/Plan:  1  Uncontrolled type 2 diabetes with hyperglycemia on long-term insulin use: Increase Lantus to 45 units twice a day and increase NovoLog to 28 a c   At sliding scale of 2 extra units for every 50/150 for correction  This correction scale can be used if needed when NPO if blood sugars too high prior to procedure  Patient states her goal blood sugar is less than 150 today of her PET scan  I suggested blood sugars be sent in 1 week for review for additional changes  I asked for a 2:00 a m  blood sugar to make sure his she is not having overnight hypoglycemia causing morning hyperglycemia  I suggested we discontinue glipizide  We will continue metformin  Follow-up in 3 months  2   Hyperlipidemia:  Check lipids before next appointment  3   Hypertension:  Continue current regimen  CC: Diabetes Consult    History of Present Illness     HPI: Alycia Byrd is a 68y o  year old female with type 2 diabetes presents for follow up appointment  She is undergoing work up for hx of endometrial ca and needs a pet but sugars have been uncontrolled  She takes Lantus 40 bid, Novolog 26 ac, glipizide 5 bid, Metformin 1000 bid  She has had diabetes for about 10 years  She has a history of diabetes complicated by cataracts, retinopathy  No hypoglycemia  Blood Sugar/Glucometer/Pump/CGM review:     Blood sugars are frequently over 200 in the morning  No hypoglycemia over the past week  Blood sugars frequently over 200 later in the day as well  Review of Systems   Constitutional: Negative for chills, fatigue and fever     HENT: Negative for trouble swallowing and voice change  Eyes: Negative for visual disturbance  Respiratory: Negative for shortness of breath  Cardiovascular: Negative for chest pain, palpitations and leg swelling  Gastrointestinal: Negative for abdominal pain, nausea and vomiting  Endocrine: Negative for polydipsia and polyuria  Musculoskeletal: Negative for arthralgias and myalgias  Skin: Negative for rash  Neurological: Negative for dizziness, tremors and weakness  Hematological: Negative for adenopathy  Psychiatric/Behavioral: Negative for agitation and confusion         Historical Information   Past Medical History:   Diagnosis Date    Anxiety     Cancer [facility])     uterine    [facility]est[facility])     Chronic pain     Diabetes mellitus ([facility] [address] )     Gout     Gout     Hyperlipidemia     Hypertension     Psychiatric disorder     anxiety and depression    TIA (transient ischemic attack)      Past Surgical History:   Procedure Laterality Date    BILATERAL SALPINGOOPHORECTOMY Bilateral 1/18/2016    Procedure: SALPINGO-OOPHORECTOMY- ROBOTIC ;  Surgeon: Ebony Munoz MD;  [facility];  Service:    Vena Ángel CHOLECYSTECTOMY      CYSTOSCOPY N/A 1/18/2016    Procedure: Sydelle Boys;  Surgeon: Ebony Munoz MD;  [facility];  Service:     DILATION AND CURETTAGE OF UTERUS      DILATION AND CURETTAGE OF UTERUS N/A 1/18/2016    Procedure: DILATATION AND CURETTAGE (D&C)- vaginal cuff repair;  Surgeon: Ebony Munoz MD;  [facility];  Service:     HYSTERECTOMY N/A 1/18/2016    Procedure: HYSTERECTOMY LAPAROSCOPIC TOTAL ([address] [place] [address]) W/ ROBOTICS;  Surgeon: Ebony Munoz MD;  [facility];  Service:     TUBAL LIGATION      UTERINE FIBROID SURGERY      VENTRAL HERNIA REPAIR N/A 1/18/2016    Procedure: REPAIR HERNIA VENTRAL- POSSIBLE MESH;  Surgeon: Ebony Munoz MD;  [facility];  Service:      Social History   History   Alcohol Use    Yes     Comment: socially History   Drug Use No     History   Smoking Status    Former Smoker   Smokeless Tobacco    Never Used     Family History:   Family History   Problem Relation Age of Onset    Heart disease Mother     Macular degeneration Mother     Hyperlipidemia Mother     Prostate cancer Father     Lung cancer Father     Heart disease Father     Diabetes unspecified Father     Glaucoma Father        Meds/Allergies   Current Outpatient Prescriptions   Medication Sig Dispense Refill    acetaminophen (TYLENOL) 500 mg tablet Take 325 mg by mouth as needed        allopurinol (ZYLOPRIM) 300 mg tablet Take 300 mg by mouth daily   aspirin 325 mg tablet Take 325 mg by mouth daily      atorvastatin (LIPITOR) 80 mg tablet Take 80 mg by mouth daily      busPIRone (BUSPAR) 15 mg tablet Take 1 tablet by mouth 2 (two) times a day      calcium carbonate (TUMS) 500 mg chewable tablet Chew 1 tablet daily      divalproex sodium (DEPAKOTE) 125 mg EC tablet Take 250 mg by mouth every 12 (twelve) hours      DULoxetine (CYMBALTA) 60 mg delayed release capsule Take 60 mg by mouth daily        furosemide (LASIX) 40 mg tablet Take 1 tablet by mouth daily for 30 days 30 tablet 0    gabapentin (NEURONTIN) 300 mg capsule Take 1 capsule by mouth 2 (two) times a day      insulin aspart (NOVOLOG) 100 units/mL injection 28 units ac plus 2:50>150  10 mL 0    insulin glargine (LANTUS SOLOSTAR) 100 units/mL injection pen 45 units bid  5 pen 0    Insulin Pen Needle (BD PEN NEEDLE AMANDA U/F) 32G X 4 MM MISC by Does not apply route 4 (four) times a day      loperamide (IMODIUM) 2 mg capsule Take 2 mg by mouth      LORazepam (ATIVAN) 0 5 mg tablet Take 1 tablet by mouth 2 (two) times a day as needed      losartan (COZAAR) 100 MG tablet Take 100 mg by mouth daily        magnesium hydroxide (MILK OF MAGNESIA) 400 mg/5 mL oral suspension Take by mouth daily as needed for constipation      melatonin 3 mg Take 3 mg by mouth daily at bedtime  metFORMIN (GLUCOPHAGE) 1000 MG tablet Take 1,000 mg by mouth 2 (two) times a day with meals      metoprolol succinate (TOPROL-XL) 50 mg 24 hr tablet Take 1 tablet (50 mg total) by mouth daily for 30 days 30 tablet 0    omeprazole (PriLOSEC) 20 mg delayed release capsule Take 20 mg by mouth daily      polyethylene glycol (MIRALAX) 17 g packet Take 17 g by mouth daily      potassium chloride (K-DUR,KLOR-CON) 20 mEq tablet Take 1 tablet by mouth daily for 30 days 30 tablet 0    Syringe, Disposable, (30CC SYRINGE) 30 ML MISC by Does not apply route      LANTUS 100 UNIT/ML subcutaneous injection       Multiple Vitamin (MULTIVITAMIN) tablet Take 1 tablet by mouth daily   ranitidine (ZANTAC) 150 mg tablet Take 1 tablet by mouth 2 (two) times a day       No current facility-administered medications for this visit  Allergies   Allergen Reactions    Promethazine Rash    Sulfa Antibiotics Rash       Objective   Vitals: Blood pressure 152/84, pulse 84, height 5' 1" (1 549 m)  Invasive Devices          No matching active lines, drains, or airways          Physical Exam    The history was obtained from the review of the chart and from the patient  Lab Results:   [facility]/2018:  Glucose 304, BUN 21, creatinine 0 7, GFR 81, sodium 135, potassium 4 7, calcium 8 8, A1c 10, hemoglobin 11 2      Lab Results   Component Value Date    CREATININE 0 76 05/04/2017    CREATININE 0 90 01/20/2017    CREATININE 0 66 01/18/2017    BUN 21 05/04/2017     05/04/2017    K 4 2 05/04/2017    CL 99 (L) 05/04/2017    CO2 28 05/04/2017     eGFR   Date Value Ref Range Status   05/04/2017 >60 0 ml/min/1 73sq m Final   01/12/2017 >60 0 ml/min/1 73sq m Final     No components found for: [facility]    Lab Results   Component Value Date    CHOL 179 05/04/2017    HDL 34 (L) 05/04/2017    TRIG 322 (H) 05/04/2017       Lab Results   Component Value Date    ALT 39 05/04/2017    AST 18 05/04/2017    ALKPHOS 147 (H) 05/04/2017    BILITOT 0 29 05/04/2017       No results found for: TSH, FREET4, TSI    Recent Results (from the past 39717 hour(s))   CBC and differential    Collection Time: 05/04/17  9:05 AM   Result Value Ref Range    WBC 5 93 4 31 - 10 16 Thousand/uL    RBC 3 98 3 81 - 5 12 Million/uL    Hemoglobin 12 3 11 5 - 15 4 g/dL    Hematocrit 37 8 34 8 - 46 1 %    MCV 95 82 - 98 fL    MCH 30 9 26 8 - 34 3 pg    MCHC 32 5 31 4 - 37 4 g/dL    RDW 14 6 11 6 - 15 1 %    MPV 10 0 8 9 - 12 7 fL    Platelets 287 679 - 425 Thousands/uL    nRBC 0 /100 WBCs    Neutrophils Relative 72 43 - 75 %    Lymphocytes Relative 18 14 - 44 %    Monocytes Relative 6 4 - 12 %    Eosinophils Relative 3 0 - 6 %    Basophils Relative 1 0 - 1 %    Neutrophils Absolute 4 22 1 85 - 7 62 Thousands/µL    Lymphocytes Absolute 1 08 0 60 - 4 47 Thousands/µL    Monocytes Absolute 0 34 0 17 - 1 22 Thousand/µL    Eosinophils Absolute 0 15 0 00 - 0 61 Thousand/µL    Basophils Absolute 0 03 0 00 - 0 10 Thousands/µL   Comprehensive metabolic panel    Collection Time: 05/04/17  9:05 AM   Result Value Ref Range    Sodium 136 136 - 145 mmol/L    Potassium 4 2 3 5 - 5 3 mmol/L    Chloride 99 (L) 100 - 108 mmol/L    CO2 28 21 - 32 mmol/L    Anion Gap 9 4 - 13 mmol/L    BUN 21 5 - 25 mg/dL    Creatinine 0 76 0 60 - 1 30 mg/dL    Glucose, Fasting 391 (H) 65 - 99 mg/dL    Calcium 9 4 8 3 - 10 1 mg/dL    AST 18 5 - 45 U/L    ALT 39 12 - 78 U/L    Alkaline Phosphatase 147 (H) 46 - 116 U/L    Total Protein 7 1 6 4 - 8 2 g/dL    Albumin 3 0 (L) 3 5 - 5 0 g/dL    Total Bilirubin 0 29 0 20 - 1 00 mg/dL    eGFR >60 0 ml/min/1 73sq m   Lipid panel    Collection Time: 05/04/17  9:05 AM   Result Value Ref Range    Cholesterol 179 50 - 200 mg/dL    Triglycerides 322 (H) <=150 mg/dL    HDL, Direct 34 (L) 40 - 60 mg/dL    LDL Calculated 81 0 - 100 mg/dL   TSH, 3rd generation with T4 reflex    Collection Time: 05/04/17  9:05 AM   Result Value Ref Range    TSH 3RD GENERATON 1 300 0 358 - 3 740 uIU/mL   Hemoglobin A1c    Collection Time: 05/04/17  9:05 AM   Result Value Ref Range    Hemoglobin A1C 10 7 (H) 4 2 - 6 3 %     mg/dl   UA w Reflex to Microscopic And Culture    Collection Time: 05/04/17  9:05 AM   Result Value Ref Range    Color, UA Yellow     Clarity, UA Clear     Specific Gravity, UA 1 029 1 003 - 1 030    pH, UA 5 5 4 5 - 8 0    Leukocytes, UA Negative Negative    Nitrite, UA Negative Negative    Protein, UA Negative Negative mg/dl    Glucose, UA >=1000 (1%) (A) Negative mg/dl    Ketones, UA Trace (A) Negative mg/dl    Urobilinogen, UA 0 2 0 2, 1 0 E U /dl E U /dl    Bilirubin, UA Negative Negative    Blood, UA Negative Negative   Uric acid    Collection Time: 05/04/17  9:05 AM   Result Value Ref Range    Uric Acid 5 1 2 0 - 6 8 mg/dL   Urine Microscopic    Collection Time: 05/04/17  9:05 AM   Result Value Ref Range    RBC, UA None Seen None Seen /hpf    WBC, UA None Seen None Seen /hpf    Epithelial Cells None Seen None Seen, Occasional /hpf    Bacteria, UA Occasional None Seen, Occasional /hpf    Hyaline Casts, UA None Seen None Seen /lpf   Tissue Exam    Collection Time: 04/10/18  2:49 PM   Result Value Ref Range    Case Report       Surgical Pathology Report                         Case: [id number]                                   Authorizing Provider:  ALDO Hansen       Collected:           04/10/2018 1449              Ordering Location:     [facility] Received:            04/10/2018 [address] [place] [address]                                                           Pathologist:           Marissa Monet MD                                                         Specimen:    Vaginal, Vaginal lesion                                                                    Final Diagnosis       A    Vaginal lesion:     - Portions of squamous mucosa admixed with adenocarcinoma, morphologically in keeping with patient's known       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)  Additional Information       All controls performed with the immunohistochemical stains reported above reacted appropriately  These tests were developed and their performance characteristics determined by [facility] or [facility]  They may not be cleared or approved by the [place]  Food and Drug Administration  The FDA has determined that such clearance or approval is not necessary  These tests are used for clinical purposes  They should not be regarded as investigational or for research  This laboratory has been approved by [facility], designated as a high-complexity laboratory and is qualified to perform these tests  - Interpretation performed at [facility], [address]       Gross Description        A  The specimen is received in formalin, labeled with the patient's name and hospital number, and is designated "vaginal lesion "  The specimen consists of irregular soft friable tan-brown  tissue fragments measuring 0 6 x 0 6 x 0 15 cm in aggregate dimension  Entirely submitted  One cassette  Note: The estimated total formalin fixation time based upon information provided by the submitting clinician and the standard processing schedule is less than 72 hours    Tavon Song               Future Appointments  Date Time Provider Piotr Ortiz   10/16/2018 11:00 AM Denise Hawkins MD GYN ONC [facility]

## 2018-06-11 NOTE — PROGRESS NOTES
6/11/2018    Assessment/Plan      Diagnoses and all orders for this visit:    Type 2 diabetes mellitus with hyperglycemia, with long-term current use of insulin ([facility] )  -     HEMOGLOBIN A1C W/ EAG ESTIMATION Lab Collect; Future  -     Comprehensive metabolic panel Lab Collect; Future  -     Microalbumin / creatinine urine ratio- Lab Collect; Future  -     insulin glargine (LANTUS SOLOSTAR) 100 units/mL injection pen; 45 units bid   -     insulin aspart (NOVOLOG) 100 units/mL injection; 28 units ac plus 2:50>150  Essential hypertension  -     metoprolol succinate (TOPROL-XL) 50 mg 24 hr tablet; Take 1 tablet (50 mg total) by mouth daily for 30 days    Mixed hyperlipidemia  -     Lipid Panel with Direct LDL reflex Lab Collect; Future    Other orders  -     aspirin 325 mg tablet; Take 325 mg by mouth daily  -     atorvastatin (LIPITOR) 80 mg tablet; Take 80 mg by mouth daily  -     divalproex sodium (DEPAKOTE) 125 mg EC tablet; Take 250 mg by mouth every 12 (twelve) hours  -     melatonin 3 mg; Take 3 mg by mouth daily at bedtime  -     omeprazole (PriLOSEC) 20 mg delayed release capsule; Take 20 mg by mouth daily  -     metFORMIN (GLUCOPHAGE) 1000 MG tablet; Take 1,000 mg by mouth 2 (two) times a day with meals  -     Discontinue: insulin aspart (NOVOLOG) 100 units/mL injection; Inject 26 Units under the skin 3 (three) times a day before meals  -     loperamide (IMODIUM) 2 mg capsule; Take 2 mg by mouth  -     calcium carbonate (TUMS) 500 mg chewable tablet; Chew 1 tablet daily  -     polyethylene glycol (MIRALAX) 17 g packet; Take 17 g by mouth daily  -     magnesium hydroxide (MILK OF MAGNESIA) 400 mg/5 mL oral suspension; Take by mouth daily as needed for constipation        Assessment/Plan:  1  Uncontrolled type 2 diabetes with hyperglycemia on long-term insulin use: Increase Lantus to 45 units twice a day and increase NovoLog to 28 a c   At sliding scale of 2 extra units for every 50/150 for correction  This correction scale can be used if needed when NPO if blood sugars too high prior to procedure  Patient states her goal blood sugar is less than 150 today of her PET scan  I suggested blood sugars be sent in 1 week for review for additional changes  I asked for a 2:00 a m  blood sugar to make sure his she is not having overnight hypoglycemia causing morning hyperglycemia  I suggested we discontinue glipizide  We will continue metformin  Follow-up in 3 months  2   Hyperlipidemia:  Check lipids before next appointment  3   Hypertension:  Continue current regimen  CC: Diabetes Consult    History of Present Illness     HPI: Mj Combs is a 68y o  year old female with type 2 diabetes presents for follow up appointment  She is undergoing work up for hx of endometrial ca and needs a pet but sugars have been uncontrolled  She takes Lantus 40 bid, Novolog 26 ac, glipizide 5 bid, Metformin 1000 bid  She has had diabetes for about 10 years  She has a history of diabetes complicated by cataracts, retinopathy  No hypoglycemia  Blood Sugar/Glucometer/Pump/CGM review:     Blood sugars are frequently over 200 in the morning  No hypoglycemia over the past week  Blood sugars frequently over 200 later in the day as well  Review of Systems   Constitutional: Negative for chills, fatigue and fever  HENT: Negative for trouble swallowing and voice change  Eyes: Negative for visual disturbance  Respiratory: Negative for shortness of breath  Cardiovascular: Negative for chest pain, palpitations and leg swelling  Gastrointestinal: Negative for abdominal pain, nausea and vomiting  Endocrine: Negative for polydipsia and polyuria  Musculoskeletal: Negative for arthralgias and myalgias  Skin: Negative for rash  Neurological: Negative for dizziness, tremors and weakness  Hematological: Negative for adenopathy  Psychiatric/Behavioral: Negative for agitation and confusion  Historical Information   Past Medical History:   Diagnosis Date    Anxiety     Cancer [facility])     uterine    CHF (congestive heart failure) (HCC)     Chronic pain     Diabetes mellitus ([address] )     Gout     Gout     Hyperlipidemia     Hypertension     Psychiatric disorder     anxiety and depression    TIA (transient ischemic attack)      Past Surgical History:   Procedure Laterality Date    BILATERAL SALPINGOOPHORECTOMY Bilateral 1/18/2016    Procedure: SALPINGO-OOPHORECTOMY- ROBOTIC ;  Surgeon: Yaron Roland MD;  Location: [facility];  Service:    [facility] CHOLECYSTECTOMY      CYSTOSCOPY N/A 1/18/2016    Procedure: Willaim Alu;  Surgeon: Yaron Roland MD;  [facility];  Service:     DILATION AND CURETTAGE OF UTERUS      DILATION AND CURETTAGE OF UTERUS N/A 1/18/2016    Procedure: DILATATION AND CURETTAGE (D&C)- vaginal cuff repair;  Surgeon: Yaron Roland MD;  [facility];  Service:     HYSTERECTOMY N/A 1/18/2016    Procedure: HYSTERECTOMY LAPAROSCOPIC TOTAL ([address] [place] [address]) W/ ROBOTICS;  Surgeon: Yaron Roland MD;  [facility];  Service:     TUBAL LIGATION      UTERINE FIBROID SURGERY      VENTRAL HERNIA REPAIR N/A 1/18/2016    Procedure: REPAIR HERNIA VENTRAL- POSSIBLE MESH;  Surgeon: Yaron Roland MD;  [facility];  Service:      Social History   History   Alcohol Use    Yes     Comment: socially     History   Drug Use No     History   Smoking Status    Former Smoker   Smokeless Tobacco    Never Used     Family History:   Family History   Problem Relation Age of Onset    Heart disease Mother     Macular degeneration Mother     Hyperlipidemia Mother     Prostate cancer Father     Lung cancer Father     Heart disease Father     Diabetes unspecified Father     Glaucoma Father        Meds/Allergies   Current Outpatient Prescriptions   Medication Sig Dispense Refill    acetaminophen (TYLENOL) 500 mg tablet Take 325 mg by mouth as needed        allopurinol (ZYLOPRIM) 300 mg tablet Take 300 mg by mouth daily   aspirin 325 mg tablet Take 325 mg by mouth daily      atorvastatin (LIPITOR) 80 mg tablet Take 80 mg by mouth daily      busPIRone (BUSPAR) 15 mg tablet Take 1 tablet by mouth 2 (two) times a day      calcium carbonate (TUMS) 500 mg chewable tablet Chew 1 tablet daily      divalproex sodium (DEPAKOTE) 125 mg EC tablet Take 250 mg by mouth every 12 (twelve) hours      DULoxetine (CYMBALTA) 60 mg delayed release capsule Take 60 mg by mouth daily        furosemide (LASIX) 40 mg tablet Take 1 tablet by mouth daily for 30 days 30 tablet 0    gabapentin (NEURONTIN) 300 mg capsule Take 1 capsule by mouth 2 (two) times a day      insulin aspart (NOVOLOG) 100 units/mL injection 28 units ac plus 2:50>150  10 mL 0    insulin glargine (LANTUS SOLOSTAR) 100 units/mL injection pen 45 units bid  5 pen 0    Insulin Pen Needle (BD PEN NEEDLE AMANDA U/F) 32G X 4 MM MISC by Does not apply route 4 (four) times a day      loperamide (IMODIUM) 2 mg capsule Take 2 mg by mouth      LORazepam (ATIVAN) 0 5 mg tablet Take 1 tablet by mouth 2 (two) times a day as needed      losartan (COZAAR) 100 MG tablet Take 100 mg by mouth daily        magnesium hydroxide (MILK OF MAGNESIA) 400 mg/5 mL oral suspension Take by mouth daily as needed for constipation      melatonin 3 mg Take 3 mg by mouth daily at bedtime      metFORMIN (GLUCOPHAGE) 1000 MG tablet Take 1,000 mg by mouth 2 (two) times a day with meals      metoprolol succinate (TOPROL-XL) 50 mg 24 hr tablet Take 1 tablet (50 mg total) by mouth daily for 30 days 30 tablet 0    omeprazole (PriLOSEC) 20 mg delayed release capsule Take 20 mg by mouth daily      polyethylene glycol (MIRALAX) 17 g packet Take 17 g by mouth daily      potassium chloride (K-DUR,KLOR-CON) 20 mEq tablet Take 1 tablet by mouth daily for 30 days 30 tablet 0    Syringe, Disposable, (30CC SYRINGE) 30 ML MISC by Does not apply route  LANTUS 100 UNIT/ML subcutaneous injection       Multiple Vitamin (MULTIVITAMIN) tablet Take 1 tablet by mouth daily   ranitidine (ZANTAC) 150 mg tablet Take 1 tablet by mouth 2 (two) times a day       No current facility-administered medications for this visit  Allergies   Allergen Reactions    Promethazine Rash    Sulfa Antibiotics Rash       Objective   Vitals: Blood pressure 152/84, pulse 84, height 5' 1" (1 549 m)  Invasive Devices          No matching active lines, drains, or airways          Physical Exam    The history was obtained from the review of the chart and from the patient  Lab Results:   Labs from [facility]  laboratories on 04/24/2018:  Glucose 304, BUN 21, creatinine 0 7, GFR 81, sodium 135, potassium 4 7, calcium 8 8, A1c 10, hemoglobin 11 2      Lab Results   Component Value Date    CREATININE 0 76 05/04/2017    CREATININE 0 90 01/20/2017    CREATININE 0 66 01/18/2017    BUN 21 05/04/2017     05/04/2017    K 4 2 05/04/2017    CL 99 (L) 05/04/2017    CO2 28 05/04/2017     eGFR   Date Value Ref Range Status   05/04/2017 >60 0 ml/min/1 73sq m Final   01/12/2017 >60 0 ml/min/1 73sq m Final     No components found for: [facility]    Lab Results   Component Value Date    CHOL 179 05/04/2017    HDL 34 (L) 05/04/2017    TRIG 322 (H) 05/04/2017       Lab Results   Component Value Date    ALT 39 05/04/2017    AST 18 05/04/2017    ALKPHOS 147 (H) 05/04/2017    BILITOT 0 29 05/04/2017       No results found for: TSH, FREET4, TSI    Recent Results (from the past 09780 hour(s))   CBC and differential    Collection Time: 05/04/17  9:05 AM   Result Value Ref Range    WBC 5 93 4 31 - 10 16 Thousand/uL    RBC 3 98 3 81 - 5 12 Million/uL    Hemoglobin 12 3 11 5 - 15 4 g/dL    Hematocrit 37 8 34 8 - 46 1 %    MCV 95 82 - 98 fL    MCH 30 9 26 8 - 34 3 pg    MCHC 32 5 31 4 - 37 4 g/dL    RDW 14 6 11 6 - 15 1 %    MPV 10 0 8 9 - 12 7 fL    Platelets 457 217 - 049 Thousands/uL    nRBC 0 /100 WBCs Neutrophils Relative 72 43 - 75 %    Lymphocytes Relative 18 14 - 44 %    Monocytes Relative 6 4 - 12 %    Eosinophils Relative 3 0 - 6 %    Basophils Relative 1 0 - 1 %    Neutrophils Absolute 4 22 1 85 - 7 62 Thousands/µL    Lymphocytes Absolute 1 08 0 60 - 4 47 Thousands/µL    Monocytes Absolute 0 34 0 17 - 1 22 Thousand/µL    Eosinophils Absolute 0 15 0 00 - 0 61 Thousand/µL    Basophils Absolute 0 03 0 00 - 0 10 Thousands/µL   Comprehensive metabolic panel    Collection Time: 05/04/17  9:05 AM   Result Value Ref Range    Sodium 136 136 - 145 mmol/L    Potassium 4 2 3 5 - 5 3 mmol/L    Chloride 99 (L) 100 - 108 mmol/L    CO2 28 21 - 32 mmol/L    Anion Gap 9 4 - 13 mmol/L    BUN 21 5 - 25 mg/dL    Creatinine 0 76 0 60 - 1 30 mg/dL    Glucose, Fasting 391 (H) 65 - 99 mg/dL    Calcium 9 4 8 3 - 10 1 mg/dL    AST 18 5 - 45 U/L    ALT 39 12 - 78 U/L    Alkaline Phosphatase 147 (H) 46 - 116 U/L    Total Protein 7 1 6 4 - 8 2 g/dL    Albumin 3 0 (L) 3 5 - 5 0 g/dL    Total Bilirubin 0 29 0 20 - 1 00 mg/dL    eGFR >60 0 ml/min/1 73sq m   Lipid panel    Collection Time: 05/04/17  9:05 AM   Result Value Ref Range    Cholesterol 179 50 - 200 mg/dL    Triglycerides 322 (H) <=150 mg/dL    HDL, Direct 34 (L) 40 - 60 mg/dL    LDL Calculated 81 0 - 100 mg/dL   TSH, 3rd generation with T4 reflex    Collection Time: 05/04/17  9:05 AM   Result Value Ref Range    TSH 3RD GENERATON 1 300 0 358 - 3 740 uIU/mL   Hemoglobin A1c    Collection Time: 05/04/17  9:05 AM   Result Value Ref Range    Hemoglobin A1C 10 7 (H) 4 2 - 6 3 %     mg/dl   UA w Reflex to Microscopic And Culture    Collection Time: 05/04/17  9:05 AM   Result Value Ref Range    Color, UA Yellow     Clarity, UA Clear     Specific Gravity, UA 1 029 1 003 - 1 030    pH, UA 5 5 4 5 - 8 0    Leukocytes, UA Negative Negative    Nitrite, UA Negative Negative    Protein, UA Negative Negative mg/dl    Glucose, UA >=1000 (1%) (A) Negative mg/dl    Ketones, UA Trace (A) Negative mg/dl    Urobilinogen, UA 0 2 0 2, 1 0 E U /dl E U /dl    Bilirubin, UA Negative Negative    Blood, UA Negative Negative   Uric acid    Collection Time: 05/04/17  9:05 AM   Result Value Ref Range    Uric Acid 5 1 2 0 - 6 8 mg/dL   Urine Microscopic    Collection Time: 05/04/17  9:05 AM   Result Value Ref Range    RBC, UA None Seen None Seen /hpf    WBC, UA None Seen None Seen /hpf    Epithelial Cells None Seen None Seen, Occasional /hpf    Bacteria, UA Occasional None Seen, Occasional /hpf    Hyaline Casts, UA None Seen None Seen /lpf   Tissue Exam    Collection Time: 04/10/18  2:49 PM   Result Value Ref Range    Case Report       Surgical Pathology Report                         Case: [id number]                                   Authorizing Provider:  ALDO Gillespie       Collected:           04/10/2018 1449              Ordering Location:     [facility] Received:            04/10/2018 [address] [place] [address]                                                           Pathologist:           Judith Benson MD                                                         Specimen:    Vaginal, Vaginal lesion                                                                    Final Diagnosis       A  Vaginal lesion:     - Portions of squamous mucosa admixed with adenocarcinoma, morphologically in keeping with patient's known       endometrioid type endometrial adenocarcinoma ([id number])  Additional Information       All controls performed with the immunohistochemical stains reported above reacted appropriately  These tests were developed and their performance characteristics determined by [facility] or [facility]  They may not be cleared or approved by the [place]  Food and Drug Administration  The FDA has determined that such clearance or approval is not necessary  These tests are used for clinical purposes   They should not be regarded as investigational or for research  This laboratory has been approved by CLIA 88, designated as a high-complexity laboratory and is qualified to perform these tests  - Interpretation performed at [facility], [address] [place] [address]       Gross Description        A  The specimen is received in formalin, labeled with the patient's name and hospital number, and is designated "vaginal lesion "  The specimen consists of irregular soft friable tan-brown  tissue fragments measuring 0 6 x 0 6 x 0 15 cm in aggregate dimension  Entirely submitted  One cassette  Note: The estimated total formalin fixation time based upon information provided by the submitting clinician and the standard processing schedule is less than 72 hours    Joel [place]               Future Appointments  Date Time Provider Piotr Ortiz   10/16/2018 11:00 AM Branden Argueta MD GYN [facility]

## 2018-06-12 ENCOUNTER — DOCTOR'S OFFICE (OUTPATIENT)
Dept: URBAN - METROPOLITAN AREA CLINIC 136 | Facility: CLINIC | Age: 73
Setting detail: OPHTHALMOLOGY
End: 2018-06-12
Payer: COMMERCIAL

## 2018-06-12 DIAGNOSIS — H25.043: ICD-10-CM

## 2018-06-12 DIAGNOSIS — H04.123: ICD-10-CM

## 2018-06-12 DIAGNOSIS — H25.013: ICD-10-CM

## 2018-06-12 DIAGNOSIS — E11.3513: ICD-10-CM

## 2018-06-12 DIAGNOSIS — H43.811: ICD-10-CM

## 2018-06-12 DIAGNOSIS — Z79.4: ICD-10-CM

## 2018-06-12 PROCEDURE — 92134 CPTRZ OPH DX IMG PST SGM RTA: CPT | Performed by: OPHTHALMOLOGY

## 2018-06-12 PROCEDURE — 92014 COMPRE OPH EXAM EST PT 1/>: CPT | Performed by: OPHTHALMOLOGY

## 2018-06-12 PROCEDURE — 67028 INJECTION EYE DRUG: CPT | Performed by: OPHTHALMOLOGY

## 2018-06-12 ASSESSMENT — TEAR BREAK UP TIME (TBUT)
OD_TBUT: 2+
OS_TBUT: 2+

## 2018-06-12 ASSESSMENT — CONFRONTATIONAL VISUAL FIELD TEST (CVF)
OD_FINDINGS: CONSTRICTION
OS_FINDINGS: FULL
OD_COMMENTS: KEPT LOOKING AT FINGERS

## 2018-06-12 ASSESSMENT — CORNEAL EDEMA CLINICAL DESCRIPTION
OS_CORNEALEDEMA: ABSENT
OD_CORNEALEDEMA: ABSENT

## 2018-06-13 ENCOUNTER — RX ONLY (RX ONLY)
Age: 73
End: 2018-06-13

## 2018-06-13 ASSESSMENT — REFRACTION_MANIFEST
OS_VA2: 20/
OD_VA3: 20/
OU_VA: 20/
OD_VA2: 20/
OS_VA2: 20/
OS_VA1: 20/
OU_VA: 20/
OD_VA1: 20/
OD_VA1: 20/
OS_VA2: 20/
OS_VA1: 20/
OD_VA1: 20/
OD_VA3: 20/
OS_VA3: 20/
OD_VA2: 20/
OD_VA2: 20/
OS_VA1: 20/
OD_VA3: 20/
OU_VA: 20/
OS_VA3: 20/
OS_VA3: 20/

## 2018-06-13 ASSESSMENT — REFRACTION_CURRENTRX
OS_OVR_VA: 20/
OD_OVR_VA: 20/
OS_OVR_VA: 20/
OD_OVR_VA: 20/
OS_OVR_VA: 20/
OD_OVR_VA: 20/

## 2018-06-13 ASSESSMENT — VISUAL ACUITY
OD_BCVA: 20/40-2
OS_BCVA: CF 2FT

## 2018-06-20 DIAGNOSIS — C54.1 ENDOMETRIAL CANCER (HCC): Primary | ICD-10-CM

## 2018-06-21 ENCOUNTER — TELEPHONE (OUTPATIENT)
Dept: ENDOCRINOLOGY | Facility: HOSPITAL | Age: 73
End: 2018-06-21

## 2018-06-21 DIAGNOSIS — Z79.4 TYPE 2 DIABETES MELLITUS WITH HYPERGLYCEMIA, WITH LONG-TERM CURRENT USE OF INSULIN (HCC): ICD-10-CM

## 2018-06-21 DIAGNOSIS — E11.65 TYPE 2 DIABETES MELLITUS WITH HYPERGLYCEMIA, WITH LONG-TERM CURRENT USE OF INSULIN (HCC): ICD-10-CM

## 2018-06-21 NOTE — TELEPHONE ENCOUNTER
Reviewed blood sugar logs  Would increase NovoLog to 32 units with meals  Continue same sliding scale  Continue Lantus 45 units twice a day  Continue metformin at current dose  Send in blood sugars in about 2 weeks for review

## 2018-06-21 NOTE — TELEPHONE ENCOUNTER
Do you still have the blood sugar log for her? Since you made changes and pt lives in a facility we will have to fax something over with the changes

## 2018-07-03 ENCOUNTER — HOSPITAL ENCOUNTER (OUTPATIENT)
Dept: RADIOLOGY | Age: 73
Discharge: HOME | End: 2018-07-03
Payer: MEDICARE

## 2018-07-03 DIAGNOSIS — C54.1 ENDOMETRIAL CANCER (HCC): ICD-10-CM

## 2018-07-03 LAB — GLUCOSE SERPL-MCNC: 152 MG/DL (ref 65–140)

## 2018-07-03 PROCEDURE — A9552 F18 FDG: HCPCS

## 2018-07-03 PROCEDURE — 82948 REAGENT STRIP/BLOOD GLUCOSE: CPT

## 2018-07-03 PROCEDURE — 78815 PET IMAGE W/CT SKULL-THIGH: CPT

## 2018-07-17 ENCOUNTER — OFFICE VISIT (OUTPATIENT)
Dept: GYNECOLOGIC ONCOLOGY | Facility: CLINIC | Age: 73
End: 2018-07-17
Payer: MEDICARE

## 2018-07-17 VITALS — DIASTOLIC BLOOD PRESSURE: 82 MMHG | RESPIRATION RATE: 14 BRPM | SYSTOLIC BLOOD PRESSURE: 138 MMHG | HEART RATE: 110 BPM

## 2018-07-17 DIAGNOSIS — C54.1 ENDOMETRIAL CANCER (HCC): Primary | ICD-10-CM

## 2018-07-17 PROCEDURE — 99215 OFFICE O/P EST HI 40 MIN: CPT | Performed by: OBSTETRICS & GYNECOLOGY

## 2018-07-17 NOTE — PROGRESS NOTES
Assessment/Plan:    Problem List Items Addressed This Visit        Genitourinary    Endometrial cancer ([facility] [address] ) - Primary     -   PET-CT with evidence of multisite recurrence involving vaginal cuff, pelvic sidewall  Patient with very poor performance status and multiple uncontrolled comorbidities  I discussed with patient and her  Son possible management options including palliative chemotherapy versus best supportive care/palliative care  I clearly explained incurable nature of her disease  I discussed typical side effects and logistics of chemotherapy  I discussed 1st line of treatment would involve carboplatin and Taxol so I discussed specific toxicities associated with those drugs  All questions were answered to their satisfaction  They are interested in considering management options  They will contact us with her decision in the near future  I spent approximately 40 minutes in the care of this patient  The entire visit was devoted to face-to-face counseling, discussion of prognosis and coordination of care  CHIEF COMPLAINT:       Here to discuss results of PET-CT and potential treatment options for recurrent endometrial cancer  Previous therapy:     Endometrial cancer ([facility] [address] )    12/9/2015 Biopsy     FINAL PATHOLOGIC DIAGNOSIS  Reported:  12/14/2015  A   Endometrium ;curetting:      -Foci of well differentiated endometrioid adenocarcinoma with mucinous  metaplasia,  FIGO grade 1 of 3 arising in background of atypical simple and complex endometrial hyperplasia         1/18/2016 Surgery     Robotic assisted total laparoscopic hysterectomy, bilateral salpingo-oophorectomy, ventral hernia repair with bioprosthetic, cystoscopy, lymphadenectomy not performed given patient's performance status, etc, no gross extrauterine disease   Final pathology consistent with 8 3 cm FIGO grade 1 tumor, invasion 12 out of 14 mm, positive lymphovascular space invasion, positive cervical stromal invasion, positive vaginal and parametrial involvement with positive cervicovaginal margin  No gross residual disease           - 5/2016 Radiation     Whole pelvic radiotherapy (3/29-5/3/2016): 4,500 cGy/25 Fx and vaginal brachytherapy (last on 5/26/2016): 1,500 cGy / 3 Fx) completed late May 2016           4/10/2018 Progression     On surveillance exam, there was a friable lesion/defect approximately 1cm in size at the left of the vaginal cuff  Area tender on bimanual exam  Biopsy obtained using Tischler instrument and sent to pathology for testing  Pathology confirmed recurrence of patient's known endometrial adenocarcinoma  7/3/2018 Progression     PET/CT scan obtained after multiple scheduling issues related to patient's uncontrolled DM  Imaging demonstrates focally increased activity corresponding to the prior CT abnormality in the left vaginal cuff compatible with viable tumor, small but hypermetabolic left pelvic sidewall lymph node concerning for carolyn disease, left inferior axillary soft tissue 17 mm nodule most concerning for inferior left axillary adenopathy, scattered groundglass opacities within the lungs without abnormal glucose activity, nonspecific mildly increased bilateral hilar activity  Patient ID: Alycia Byrd is a 68 y o  female  HPI    Patient with multiple comorbidities and biopsy-proven vaginal cuff recurrence from endometrial cancer  Concern for potential multisite recurrence  I recommended PET-CT to further evaluate  Unfortunately, this took some time as patient's diabetes was very poorly controlled  Now she presents to discuss results of PET-CT and management options  Reports nausea and occasional vomiting  Pelvic pain  Vaginal discharge    The following portions of the patient's history were reviewed and updated as appropriate: allergies, current medications, past family history, past medical history, past social history, past surgical history and problem list     Review of Systems    Wheelchair-bound  Weakness associated with neurologic disease  Otherwise 12 point review of systems is unchanged  Current Outpatient Prescriptions   Medication Sig Dispense Refill    acetaminophen (TYLENOL) 500 mg tablet Take 325 mg by mouth as needed        allopurinol (ZYLOPRIM) 300 mg tablet Take 300 mg by mouth daily   aspirin 325 mg tablet Take 325 mg by mouth daily      atorvastatin (LIPITOR) 80 mg tablet Take 80 mg by mouth daily      busPIRone (BUSPAR) 15 mg tablet Take 1 tablet by mouth 2 (two) times a day      calcium carbonate (TUMS) 500 mg chewable tablet Chew 1 tablet daily      divalproex sodium (DEPAKOTE) 125 mg EC tablet Take 250 mg by mouth every 12 (twelve) hours      DULoxetine (CYMBALTA) 60 mg delayed release capsule Take 60 mg by mouth daily        furosemide (LASIX) 40 mg tablet Take 1 tablet by mouth daily for 30 days 30 tablet 0    gabapentin (NEURONTIN) 300 mg capsule Take 1 capsule by mouth 2 (two) times a day      insulin aspart (NOVOLOG) 100 units/mL injection 32 units ac plus 2:50>150  10 mL 0    insulin glargine (LANTUS SOLOSTAR) 100 units/mL injection pen 45 units bid  5 pen 0    Insulin Pen Needle (BD PEN NEEDLE AMANDA U/F) 32G X 4 MM MISC by Does not apply route 4 (four) times a day      LANTUS 100 UNIT/ML subcutaneous injection       loperamide (IMODIUM) 2 mg capsule Take 2 mg by mouth      LORazepam (ATIVAN) 0 5 mg tablet Take 1 tablet by mouth 2 (two) times a day as needed      losartan (COZAAR) 100 MG tablet Take 100 mg by mouth daily        magnesium hydroxide (MILK OF MAGNESIA) 400 mg/5 mL oral suspension Take by mouth daily as needed for constipation      melatonin 3 mg Take 3 mg by mouth daily at bedtime      metFORMIN (GLUCOPHAGE) 1000 MG tablet Take 1,000 mg by mouth 2 (two) times a day with meals      metoprolol succinate (TOPROL-XL) 50 mg 24 hr tablet Take 1 tablet (50 mg total) by mouth daily for 30 days 30 tablet 0    Multiple Vitamin (MULTIVITAMIN) tablet Take 1 tablet by mouth daily   omeprazole (PriLOSEC) 20 mg delayed release capsule Take 20 mg by mouth daily      polyethylene glycol (MIRALAX) 17 g packet Take 17 g by mouth daily      potassium chloride (K-DUR,KLOR-CON) 20 mEq tablet Take 1 tablet by mouth daily for 30 days 30 tablet 0    ranitidine (ZANTAC) 150 mg tablet Take 1 tablet by mouth 2 (two) times a day      Syringe, Disposable, (30CC SYRINGE) 30 ML MISC by Does not apply route       No current facility-administered medications for this visit  Objective:    Blood pressure 138/82, pulse (!) 110, resp  rate 14  There is no height or weight on file to calculate BMI  There is no height or weight on file to calculate BSA  Physical Exam    Physical exam deferred  Lab Results   Component Value Date     6 5 01/04/2016     Lab Results   Component Value Date     05/04/2017    K 4 2 05/04/2017    CL 99 (L) 05/04/2017    CO2 28 05/04/2017    ANIONGAP 9 05/04/2017    BUN 21 05/04/2017    CREATININE 0 76 05/04/2017    GLUCOSE 75 01/20/2017    GLUF 391 (H) 05/04/2017    CALCIUM 9 4 05/04/2017    AST 18 05/04/2017    ALT 39 05/04/2017    ALKPHOS 147 (H) 05/04/2017    PROT 7 1 05/04/2017    BILITOT 0 29 05/04/2017    EGFR >60 0 05/04/2017     Lab Results   Component Value Date    WBC 5 93 05/04/2017    HGB 12 3 05/04/2017    HCT 37 8 05/04/2017    MCV 95 05/04/2017     05/04/2017     Lab Results   Component Value Date    NEUTROABS 4 22 05/04/2017     Study Result     PET/CT SCAN     INDICATION: C54 1: Malignant neoplasm of endometrium  Recurrence in the vaginal cuff  Restaging examination      MODIFIER: PS      COMPARISON: CT chest abdomen and pelvis 5/1/2018  No prior PET CT studies      CELL TYPE:  Well-differentiated endometrioid adenocarcinoma     TECHNIQUE:   8 0 mCi F-18-FDG administered IV   Multiplanar attenuation corrected and non attenuation corrected PET images are available for interpretation, and contiguous, low dose, axial CT sections were obtained from the skull base through the femurs   No oral contrast   Intravenous contrast material was not utilized  This examination, like all CT scans performed in the [facility], was performed utilizing techniques to minimize radiation dose exposure, including the use of iterative   reconstruction and automated exposure control      Fasting serum glucose: 152 mg/dl     FINDINGS:      VISUALIZED BRAIN:     No acute abnormalities are seen      HEAD/NECK:     There is a physiologic distribution of FDG  No FDG avid cervical adenopathy is seen  CT images: Unremarkable      CHEST:     Scattered groundglass opacities are identified within the lungs, for example in the right apex image 76, left upper lobe image 95, and more discrete subpleural density in the left lower lobe on image 100 measuring 1 cm, without abnormal glucose activity  No hypermetabolic foci within the lung parenchyma      The left hilar SUV max is 2 7, with additional discrete focus of increased activity in the inferior left hilum on image 99 with SUV max 2 2  Right hilar SUV max is 3 1  No hypermetabolic foci within the mediastinum  No pleural or pericardial effusion  There is however a soft tissue nodule identified in the region of the inferior left axilla in image 96 measuring 17 mm, SUV max 4 4      ABDOMEN:     No hypermetabolic lesions are identified in the upper abdomen  CT images: Nonobstructing right renal calculus noted  Patient is status post cholecystectomy      PELVIS:   A focus of increased activity along the left iliac vessels on image 181 is felt to be ureteral urine activity rather than adenopathy as no soft tissue nodule is detected    However further inferiorly, there is a hypermetabolic left pelvic sidewall lymph   node on image 206 measuring 2 4 x 1 0 cm, SUV max of only 1 6 however activity felt to be significantly increased above background on Q clear image processing  As noted on prior CT examination there is intense activity identified in the region of the   left vaginal cuff  On image 223 for example, SUV max 7 7     OSSEOUS STRUCTURES:  No FDG avid lesions are seen  CT images: No significant findings      IMPRESSION:     1  Focally increased activity corresponding to the prior CT abnormality in the left vaginal cuff compatible with viable tumor, SUV max 7 7  2  Small but hypermetabolic left pelvic sidewall lymph node concerning for carolyn disease  3  No evidence of hypermetabolic metastatic disease in the upper abdomen  4  Left inferior axillary soft tissue 17 mm nodule with SUV max 4 4  This is most concerning for inferior left axillary adenopathy  5  Scattered groundglass opacities within the lungs without abnormal glucose activity which can be reassessed during follow-up  6    Nonspecific mildly increased bilateral hilar activity which can also be reassessed during follow-up        Workstation performed: [id number]        Sally Pereyra MD, Cy, ROBER  7/17/2018  11:05 AM

## 2018-07-17 NOTE — ASSESSMENT & PLAN NOTE
-   PET-CT with evidence of multisite recurrence involving vaginal cuff, pelvic sidewall  Patient with very poor performance status and multiple uncontrolled comorbidities  I discussed with patient and her  Son possible management options including palliative chemotherapy versus best supportive care/palliative care  I clearly explained incurable nature of her disease  I discussed typical side effects and logistics of chemotherapy  I discussed 1st line of treatment would involve carboplatin and Taxol so I discussed specific toxicities associated with those drugs  All questions were answered to their satisfaction  They are interested in considering management options  They will contact us with her decision in the near future

## 2018-07-17 NOTE — LETTER
July 17, 2018     Hightower Favorite, DO  [address]  [address]  [facility]    Patient: Catrachito Kirby   YOB: 1945   Date of Visit: 7/17/2018       Dear Dr Loraine Ji: Thank you for referring Catrachito Kirby to me for evaluation  Below are my notes for this consultation  If you have questions, please do not hesitate to call me  I look forward to following your patient along with you  Sincerely,        Paco Wilson MD        CC: No Recipients  Paco Wilson MD  7/17/2018 11:06 AM  Sign at close encounter  Assessment/Plan:    Problem List Items Addressed This Visit        Genitourinary    Endometrial cancer ([facility] [address] ) - Primary     -   PET-CT with evidence of multisite recurrence involving vaginal cuff, pelvic sidewall  Patient with very poor performance status and multiple uncontrolled comorbidities  I discussed with patient and her  Son possible management options including palliative chemotherapy versus best supportive care/palliative care  I clearly explained incurable nature of her disease  I discussed typical side effects and logistics of chemotherapy  I discussed 1st line of treatment would involve carboplatin and Taxol so I discussed specific toxicities associated with those drugs  All questions were answered to their satisfaction  They are interested in considering management options  They will contact us with her decision in the near future  I spent approximately 40 minutes in the care of this patient  The entire visit was devoted to face-to-face counseling, discussion of prognosis and coordination of care  CHIEF COMPLAINT:       Here to discuss results of PET-CT and potential treatment options for recurrent endometrial cancer      Previous therapy:     Endometrial cancer ([facility] [address] )    12/9/2015 Biopsy     FINAL PATHOLOGIC DIAGNOSIS  Reported:  12/14/2015  A   Endometrium ;curetting:      -Foci of well differentiated endometrioid adenocarcinoma with mucinous  metaplasia,  FIGO grade 1 of 3 arising in background of atypical simple and complex endometrial hyperplasia         1/18/2016 Surgery     Robotic assisted total laparoscopic hysterectomy, bilateral salpingo-oophorectomy, ventral hernia repair with bioprosthetic, cystoscopy, lymphadenectomy not performed given patient's performance status, etc, no gross extrauterine disease  Final pathology consistent with 8 3 cm FIGO grade 1 tumor, invasion 12 out of 14 mm, positive lymphovascular space invasion, positive cervical stromal invasion, positive vaginal and parametrial involvement with positive cervicovaginal margin  No gross residual disease           - 5/2016 Radiation     Whole pelvic radiotherapy (3/29-5/3/2016): 4,500 cGy/25 Fx and vaginal brachytherapy (last on 5/26/2016): 1,500 cGy / 3 Fx) completed late May 2016           4/10/2018 Progression     On surveillance exam, there was a friable lesion/defect approximately 1cm in size at the left of the vaginal cuff  Area tender on bimanual exam  Biopsy obtained using Tischler instrument and sent to pathology for testing  Pathology confirmed recurrence of patient's known endometrial adenocarcinoma  7/3/2018 Progression     PET/CT scan obtained after multiple scheduling issues related to patient's uncontrolled DM  Imaging demonstrates focally increased activity corresponding to the prior CT abnormality in the left vaginal cuff compatible with viable tumor, small but hypermetabolic left pelvic sidewall lymph node concerning for carolyn disease, left inferior axillary soft tissue 17 mm nodule most concerning for inferior left axillary adenopathy, scattered groundglass opacities within the lungs without abnormal glucose activity, nonspecific mildly increased bilateral hilar activity                Patient ID: Otilio Barillas is a 68 y o  female  HPI    Patient with multiple comorbidities and biopsy-proven vaginal cuff recurrence from endometrial cancer  Concern for potential multisite recurrence  I recommended PET-CT to further evaluate  Unfortunately, this took some time as patient's diabetes was very poorly controlled  Now she presents to discuss results of PET-CT and management options  Reports nausea and occasional vomiting  Pelvic pain  Vaginal discharge  The following portions of the patient's history were reviewed and updated as appropriate: allergies, current medications, past family history, past medical history, past social history, past surgical history and problem list     Review of Systems    Wheelchair-bound  Weakness associated with neurologic disease  Otherwise 12 point review of systems is unchanged  Current Outpatient Prescriptions   Medication Sig Dispense Refill    acetaminophen (TYLENOL) 500 mg tablet Take 325 mg by mouth as needed        allopurinol (ZYLOPRIM) 300 mg tablet Take 300 mg by mouth daily   aspirin 325 mg tablet Take 325 mg by mouth daily      atorvastatin (LIPITOR) 80 mg tablet Take 80 mg by mouth daily      busPIRone (BUSPAR) 15 mg tablet Take 1 tablet by mouth 2 (two) times a day      calcium carbonate (TUMS) 500 mg chewable tablet Chew 1 tablet daily      divalproex sodium (DEPAKOTE) 125 mg EC tablet Take 250 mg by mouth every 12 (twelve) hours      DULoxetine (CYMBALTA) 60 mg delayed release capsule Take 60 mg by mouth daily        furosemide (LASIX) 40 mg tablet Take 1 tablet by mouth daily for 30 days 30 tablet 0    gabapentin (NEURONTIN) 300 mg capsule Take 1 capsule by mouth 2 (two) times a day      insulin aspart (NOVOLOG) 100 units/mL injection 32 units ac plus 2:50>150  10 mL 0    insulin glargine (LANTUS SOLOSTAR) 100 units/mL injection pen 45 units bid   5 pen 0    Insulin Pen Needle (BD PEN NEEDLE AMANDA U/F) 32G X 4 MM MISC by Does not apply route 4 (four) times a day      LANTUS 100 UNIT/ML subcutaneous injection       loperamide (IMODIUM) 2 mg capsule Take 2 mg by mouth      LORazepam (ATIVAN) 0 5 mg tablet Take 1 tablet by mouth 2 (two) times a day as needed      losartan (COZAAR) 100 MG tablet Take 100 mg by mouth daily   magnesium hydroxide (MILK OF MAGNESIA) 400 mg/5 mL oral suspension Take by mouth daily as needed for constipation      melatonin 3 mg Take 3 mg by mouth daily at bedtime      metFORMIN (GLUCOPHAGE) 1000 MG tablet Take 1,000 mg by mouth 2 (two) times a day with meals      metoprolol succinate (TOPROL-XL) 50 mg 24 hr tablet Take 1 tablet (50 mg total) by mouth daily for 30 days 30 tablet 0    Multiple Vitamin (MULTIVITAMIN) tablet Take 1 tablet by mouth daily   omeprazole (PriLOSEC) 20 mg delayed release capsule Take 20 mg by mouth daily      polyethylene glycol (MIRALAX) 17 g packet Take 17 g by mouth daily      potassium chloride (K-DUR,KLOR-CON) 20 mEq tablet Take 1 tablet by mouth daily for 30 days 30 tablet 0    ranitidine (ZANTAC) 150 mg tablet Take 1 tablet by mouth 2 (two) times a day      Syringe, Disposable, (30CC SYRINGE) 30 ML MISC by Does not apply route       No current facility-administered medications for this visit  Objective:    Blood pressure 138/82, pulse (!) 110, resp  rate 14  There is no height or weight on file to calculate BMI  There is no height or weight on file to calculate BSA  Physical Exam    Physical exam deferred      Lab Results   Component Value Date     6 5 01/04/2016     Lab Results   Component Value Date     05/04/2017    K 4 2 05/04/2017    CL 99 (L) 05/04/2017    CO2 28 05/04/2017    ANIONGAP 9 05/04/2017    BUN 21 05/04/2017    CREATININE 0 76 05/04/2017    GLUCOSE 75 01/20/2017    GLUF 391 (H) 05/04/2017    CALCIUM 9 4 05/04/2017    AST 18 05/04/2017    ALT 39 05/04/2017    ALKPHOS 147 (H) 05/04/2017    PROT 7 1 05/04/2017    BILITOT 0 29 05/04/2017    EGFR >60 0 05/04/2017     Lab Results   Component Value Date    WBC 5 93 05/04/2017    HGB 12 3 05/04/2017    HCT 37 8 05/04/2017    MCV 95 05/04/2017     05/04/2017     Lab Results   Component Value Date    NEUTROABS 4 22 05/04/2017     Study Result     PET/CT SCAN     INDICATION: C54 1: Malignant neoplasm of endometrium  Recurrence in the vaginal cuff  Restaging examination      MODIFIER: PS      COMPARISON: CT chest abdomen and pelvis 5/1/2018  No prior PET CT studies      CELL TYPE:  Well-differentiated endometrioid adenocarcinoma     TECHNIQUE:   8 0 mCi F-18-FDG administered IV  Multiplanar attenuation corrected and non attenuation corrected PET images are available for interpretation, and contiguous, low dose, axial CT sections were obtained from the skull base through the femurs   No oral contrast   Intravenous contrast material was not utilized  This examination, like all CT scans performed in the [facility], was performed utilizing techniques to minimize radiation dose exposure, including the use of iterative   reconstruction and automated exposure control      Fasting serum glucose: 152 mg/dl     FINDINGS:      VISUALIZED BRAIN:     No acute abnormalities are seen      HEAD/NECK:     There is a physiologic distribution of FDG  No FDG avid cervical adenopathy is seen  CT images: Unremarkable      CHEST:     Scattered groundglass opacities are identified within the lungs, for example in the right apex image 76, left upper lobe image 95, and more discrete subpleural density in the left lower lobe on image 100 measuring 1 cm, without abnormal glucose activity  No hypermetabolic foci within the lung parenchyma      The left hilar SUV max is 2 7, with additional discrete focus of increased activity in the inferior left hilum on image 99 with SUV max 2 2  Right hilar SUV max is 3 1  No hypermetabolic foci within the mediastinum  No pleural or pericardial effusion      There is however a soft tissue nodule identified in the region of the inferior left axilla in image 96 measuring 17 mm, SUV max 4 4      ABDOMEN:     No hypermetabolic lesions are identified in the upper abdomen  CT images: Nonobstructing right renal calculus noted  Patient is status post cholecystectomy      PELVIS:   A focus of increased activity along the left iliac vessels on image 181 is felt to be ureteral urine activity rather than adenopathy as no soft tissue nodule is detected  However further inferiorly, there is a hypermetabolic left pelvic sidewall lymph   node on image 206 measuring 2 4 x 1 0 cm, SUV max of only 1 6 however activity felt to be significantly increased above background on Q clear image processing  As noted on prior CT examination there is intense activity identified in the region of the   left vaginal cuff  On image 223 for example, SUV max 7 7     OSSEOUS STRUCTURES:  No FDG avid lesions are seen  CT images: No significant findings      IMPRESSION:     1  Focally increased activity corresponding to the prior CT abnormality in the left vaginal cuff compatible with viable tumor, SUV max 7 7  2  Small but hypermetabolic left pelvic sidewall lymph node concerning for carolyn disease  3  No evidence of hypermetabolic metastatic disease in the upper abdomen  4  Left inferior axillary soft tissue 17 mm nodule with SUV max 4 4  This is most concerning for inferior left axillary adenopathy  5  Scattered groundglass opacities within the lungs without abnormal glucose activity which can be reassessed during follow-up  6    Nonspecific mildly increased bilateral hilar activity which can also be reassessed during follow-up        Workstation performed: [id number]        Ilsa Vasquez MD, [address] [place] [address]  7/17/2018  11:05 AM

## 2018-07-24 ENCOUNTER — TELEPHONE (OUTPATIENT)
Dept: GYNECOLOGIC ONCOLOGY | Facility: CLINIC | Age: 73
End: 2018-07-24

## 2018-07-24 NOTE — TELEPHONE ENCOUNTER
received phone call from Carlitos at [facility], who stated that the patient will be cancelling her appointment in October 2018 as she has decided to pursue hospice care  Dr Yung Suh is aware